# Patient Record
Sex: FEMALE | Race: WHITE | NOT HISPANIC OR LATINO | Employment: OTHER | URBAN - METROPOLITAN AREA
[De-identification: names, ages, dates, MRNs, and addresses within clinical notes are randomized per-mention and may not be internally consistent; named-entity substitution may affect disease eponyms.]

---

## 2017-01-10 ENCOUNTER — OFFICE VISIT (OUTPATIENT)
Dept: NEUROLOGY | Facility: CLINIC | Age: 72
End: 2017-01-10
Payer: MEDICARE

## 2017-01-10 VITALS
HEIGHT: 66 IN | HEART RATE: 74 BPM | SYSTOLIC BLOOD PRESSURE: 120 MMHG | DIASTOLIC BLOOD PRESSURE: 76 MMHG | BODY MASS INDEX: 23.59 KG/M2 | WEIGHT: 146.81 LBS

## 2017-01-10 DIAGNOSIS — R41.3 MEMORY LOSS: Primary | ICD-10-CM

## 2017-01-10 PROCEDURE — 1126F AMNT PAIN NOTED NONE PRSNT: CPT | Mod: S$GLB,,, | Performed by: PSYCHIATRY & NEUROLOGY

## 2017-01-10 PROCEDURE — 1157F ADVNC CARE PLAN IN RCRD: CPT | Mod: S$GLB,,, | Performed by: PSYCHIATRY & NEUROLOGY

## 2017-01-10 PROCEDURE — 1159F MED LIST DOCD IN RCRD: CPT | Mod: S$GLB,,, | Performed by: PSYCHIATRY & NEUROLOGY

## 2017-01-10 PROCEDURE — 99999 PR PBB SHADOW E&M-EST. PATIENT-LVL III: CPT | Mod: PBBFAC,,, | Performed by: PSYCHIATRY & NEUROLOGY

## 2017-01-10 PROCEDURE — 99213 OFFICE O/P EST LOW 20 MIN: CPT | Mod: S$GLB,,, | Performed by: PSYCHIATRY & NEUROLOGY

## 2017-01-10 PROCEDURE — 1160F RVW MEDS BY RX/DR IN RCRD: CPT | Mod: S$GLB,,, | Performed by: PSYCHIATRY & NEUROLOGY

## 2017-01-10 NOTE — LETTER
January 10, 2017      Tucker Ren MD  [address]  [facility] [address]           [facility]  [address]  [facility] [address]  Phone: [phone]  Fax: [phone]          Patient: Katiana Hagan   MR Number: [id number]   YOB: 1945   Date of Visit: 1/10/2017       Dear Dr. Tucker Ren:    Thank you for referring Katiana Hagan to me for evaluation. Attached you will find relevant portions of my assessment and plan of care.    If you have questions, please do not hesitate to call me. I look forward to following Katiana Hagan along with you.    Sincerely,    Giuseppe Galvez MD    Enclosure  CC:  No Recipients    If you would like to receive this communication electronically, please contact [email or web address] or [phone] to request more information on [facility] access.    For providers and/or their staff who would like to refer a patient to [facility], please contact us through our one-stop-shop provider referral line, [facility], at [phone].    If you feel you have received this communication in error or would no longer like to receive these types of communications, please e-mail [email or web address]

## 2017-01-10 NOTE — PROGRESS NOTES
[facility] - NEUROLOGY  [facility] [place] [facility]    Date: January 10, 2017   Patient Name: Katiana Hagan   MRN: [id number]   PCP: Tucker Ren  Referring Provider: Tucker Ren MD    Assessment:      This is Katiana Hagan, 71 y.o. female with mild difficulty with memory most likely due to psychosocial stressors.    Plan:      -  Continue counseling  -  Follow up as needed     Giuseppe Galvez MD  [facility]   Department of Neurology    Subjective:   Short term memory troubles unchanged, not better or worse.  Does endorse improved mood and engaging in more social activities although has not established regular exercise.    PAST MEDICAL HISTORY:  Past Medical History   Diagnosis Date    Other and unspecified hyperlipidemia 10/29/2013       PAST SURGICAL HISTORY:  Past Surgical History   Procedure Laterality Date    Dilation and curettage of uterus      Myomectomy      Appendectomy  age 18    Breast augmentation  1988       CURRENT MEDS:  Current Outpatient Prescriptions   Medication Sig Dispense Refill    conjugated estrogens (PREMARIN) vaginal cream Place 0.5 g vaginally twice a week. Insert into vagina as directed 30 g 6    acyclovir (ZOVIRAX) 400 MG tablet       gabapentin (NEURONTIN) 100 MG capsule TK ONE C PO  QD.  0    gabapentin (NEURONTIN) 300 MG capsule   1     No current facility-administered medications for this visit.        ALLERGIES:  Review of patient's allergies indicates:   Allergen Reactions    Hydrocortisone Itching    Methylprednisolone aceponate     Prednisone Nausea And Vomiting    Tixocortol pivalate Itching       FAMILY HISTORY:  Family History   Problem Relation Age of Onset    Breast cancer Maternal Aunt     Colon cancer Neg Hx     Diabetes Neg Hx     Hypertension Neg Hx     Ovarian cancer Neg Hx     Stroke Neg Hx        SOCIAL HISTORY:  Social History   Substance Use Topics    Smoking status: Never Smoker    Smokeless  "tobacco: Never Used    Alcohol use 1.2 oz/week     2 Glasses of wine per week      Comment: social       Review of Systems:  12 review of systems is negative except for the symptoms mentioned in HPI.        Objective:     Vitals:    01/10/17 1239   BP: 120/76   Pulse: 74   Weight: 66.6 kg (146 lb 13.2 oz)   Height: 5' 6" (1.676 m)       General: NAD, well nourished   Eyes: no tearing, discharge, no erythema   ENT: moist mucous membranes of the oral cavity, nares patent    Neck: Supple, full range of motion  Cardiovascular: Warm and well perfused, pulses equal and symmetrical  Lungs: Normal work of breathing, normal chest wall excursions  Skin: No rash, lesions, or breakdown on exposed skin  Psychiatry: Mood and affect are appropriate   Abdomen: soft, non tender, non distended  Extremeties: No cyanosis, clubbing or edema.    Neurological   MENTAL STATUS: Alert and oriented to person, place, and time. Attention and concentration within normal limits. Speech without dysarthria, able to name and repeat without difficulty. Recent and remote memory within normal limits   CRANIAL NERVES: Visual fields intact. PERRL. EOMI. Facial sensation intact. Face symmetrical. Hearing grossly intact. Full shoulder shrug bilaterally. Tongue protrudes midline   SENSORY: Sensation is intact to light touch throughout.  Joint position perception intact. Negative Romberg.   MOTOR: Normal bulk and tone. No pronator drift.  5/5 deltoid, biceps, triceps, interosseous, hand  bilaterally. 5/5 iliopsoas, knee extension/flexion, foot dorsi/plantarflexion bilaterally.    CEREBELLAR/COORDINATION/GAIT: Gait steady with normal arm swing and stride length.    "

## 2017-01-10 NOTE — MR AVS SNAPSHOT
"    [facility] - Neurology  [address]  [facility] [address]  Phone: [phone]  Fax: [phone]                  Katiana Hagan   1/10/2017 12:20 PM   Office Visit    Description:  Female : 1945   Provider:  Giuseppe Galvez MD   Department:  [facility] - Neurology           Reason for Visit     Follow-up                To Do List           Goals (5 Years of Data)     None      [facility]     [facility] Nurse Care Line -  Assistance  Registered nurses in the [facility] provide clinical advisement, health education, appointment booking, and other advisory services.  Call for this free service at [phone].             Medications           Message regarding Medications     Verify the changes and/or additions to your medication regime listed below are the same as discussed with your clinician today.  If any of these changes or additions are incorrect, please notify your healthcare provider.             Verify that the below list of medications is an accurate representation of the medications you are currently taking.  If none reported, the list may be blank. If incorrect, please contact your healthcare provider. Carry this list with you in case of emergency.           Current Medications     conjugated estrogens (PREMARIN) vaginal cream Place 0.5 g vaginally twice a week. Insert into vagina as directed    acyclovir (ZOVIRAX) 400 MG tablet     gabapentin (NEURONTIN) 100 MG capsule TK ONE C PO  QD.    gabapentin (NEURONTIN) 300 MG capsule            Clinical Reference Information           Vital Signs - Last Recorded  Most recent update: 1/10/2017 12:41 PM by Gayatri Friend MA    BP Pulse Ht Wt BMI    120/76 74 5' 6" (1.676 m) 66.6 kg (146 lb 13.2 oz) 23.7 kg/m2      Blood Pressure          Most Recent Value    BP  120/76      Allergies as of 1/10/2017     Hydrocortisone    Methylprednisolone Aceponate    Prednisone    Tixocortol Pivalate      Immunizations " Administered on Date of Encounter - 1/10/2017     None

## 2017-01-23 ENCOUNTER — TELEPHONE (OUTPATIENT)
Dept: OBSTETRICS AND GYNECOLOGY | Facility: CLINIC | Age: 72
End: 2017-01-23

## 2017-01-23 NOTE — TELEPHONE ENCOUNTER
----- Message from Martín Mueller sent at 1/23/2017  3:25 PM CST -----  Contact: pt  x_ 1st Request   _ 2nd Request   _ 3rd Request     Who: ANASTACIO OG [[id number]]    Why: Pt would like a call back in reference to being referred to a Uro/Gyn doctor    What Number to Call Back: [phone]    When to Expect a call back: (Before the end of the day)   -- if call after 3:00 call back will be tomorrow.

## 2017-01-24 ENCOUNTER — TELEPHONE (OUTPATIENT)
Dept: OBSTETRICS AND GYNECOLOGY | Facility: CLINIC | Age: 72
End: 2017-01-24

## 2017-01-24 DIAGNOSIS — N39.41 URGE INCONTINENCE: Primary | ICD-10-CM

## 2017-01-24 NOTE — TELEPHONE ENCOUNTER
----- Message from Carol Barbosa sent at 1/24/2017  9:56 AM CST -----  Contact: pt   X_  1st Request  _  2nd Request  _  3rd Request    Who:ANASTACIO OG [[id number]]    Why: Patient states she is returning a call     What Number to Call Back: [phone]    When to Expect a call back: (Before the end of the day)   -- if call after 3:00 call back will be tomorrow.

## 2017-02-07 ENCOUNTER — RESEARCH ENCOUNTER (OUTPATIENT)
Dept: UROGYNECOLOGY | Facility: CLINIC | Age: 72
End: 2017-02-07

## 2017-02-07 ENCOUNTER — INITIAL CONSULT (OUTPATIENT)
Dept: UROGYNECOLOGY | Facility: CLINIC | Age: 72
End: 2017-02-07
Attending: OBSTETRICS & GYNECOLOGY
Payer: MEDICARE

## 2017-02-07 VITALS
HEIGHT: 66 IN | SYSTOLIC BLOOD PRESSURE: 110 MMHG | WEIGHT: 145.31 LBS | DIASTOLIC BLOOD PRESSURE: 80 MMHG | BODY MASS INDEX: 23.35 KG/M2

## 2017-02-07 DIAGNOSIS — N81.2 UTEROVAGINAL PROLAPSE, INCOMPLETE: ICD-10-CM

## 2017-02-07 DIAGNOSIS — N81.10 PROLAPSE OF ANTERIOR VAGINAL WALL: ICD-10-CM

## 2017-02-07 DIAGNOSIS — N39.46 MIXED INCONTINENCE: Primary | ICD-10-CM

## 2017-02-07 DIAGNOSIS — N95.2 ATROPHIC VAGINITIS: ICD-10-CM

## 2017-02-07 DIAGNOSIS — R35.1 NOCTURIA: ICD-10-CM

## 2017-02-07 LAB
BILIRUB SERPL-MCNC: NORMAL MG/DL
BLOOD URINE, POC: NORMAL
COLOR, POC UA: NORMAL
GLUCOSE UR QL STRIP: NORMAL
KETONES UR QL STRIP: NORMAL
LEUKOCYTE ESTERASE URINE, POC: NORMAL
NITRITE, POC UA: NORMAL
PH, POC UA: 7
PROTEIN, POC: NORMAL
SPECIFIC GRAVITY, POC UA: 1.01
UROBILINOGEN, POC UA: NORMAL

## 2017-02-07 PROCEDURE — 1160F RVW MEDS BY RX/DR IN RCRD: CPT | Mod: S$GLB,,, | Performed by: OBSTETRICS & GYNECOLOGY

## 2017-02-07 PROCEDURE — 1126F AMNT PAIN NOTED NONE PRSNT: CPT | Mod: S$GLB,,, | Performed by: OBSTETRICS & GYNECOLOGY

## 2017-02-07 PROCEDURE — 1157F ADVNC CARE PLAN IN RCRD: CPT | Mod: S$GLB,,, | Performed by: OBSTETRICS & GYNECOLOGY

## 2017-02-07 PROCEDURE — 51701 INSERT BLADDER CATHETER: CPT | Mod: S$GLB,,, | Performed by: OBSTETRICS & GYNECOLOGY

## 2017-02-07 PROCEDURE — 99999 PR PBB SHADOW E&M-EST. PATIENT-LVL III: CPT | Mod: PBBFAC,,, | Performed by: OBSTETRICS & GYNECOLOGY

## 2017-02-07 PROCEDURE — 81002 URINALYSIS NONAUTO W/O SCOPE: CPT | Mod: S$GLB,,, | Performed by: OBSTETRICS & GYNECOLOGY

## 2017-02-07 PROCEDURE — 1159F MED LIST DOCD IN RCRD: CPT | Mod: S$GLB,,, | Performed by: OBSTETRICS & GYNECOLOGY

## 2017-02-07 PROCEDURE — 87086 URINE CULTURE/COLONY COUNT: CPT

## 2017-02-07 PROCEDURE — 99205 OFFICE O/P NEW HI 60 MIN: CPT | Mod: 25,S$GLB,, | Performed by: OBSTETRICS & GYNECOLOGY

## 2017-02-07 RX ORDER — MULTIVITAMIN
1 TABLET ORAL DAILY
COMMUNITY
End: 2017-09-06

## 2017-02-07 RX ORDER — FLUCONAZOLE 200 MG/1
TABLET ORAL
Refills: 1 | COMMUNITY
Start: 2017-01-25 | End: 2018-09-24

## 2017-02-07 NOTE — LETTER
February 9, 2017      Araceli Quijano MD  [address] [address]           [facility]  [address] [facility] [address]  Phone: [phone]          Patient: Katiana Hagan   MR Number: [id number]   YOB: 1945   Date of Visit: 2/7/2017       Dear Dr. Araceli Quijano:    Thank you for referring Katiana Hagan to me for evaluation. Attached you will find relevant portions of my assessment and plan of care.    If you have questions, please do not hesitate to call me. I look forward to following Katiana Hagan along with you.    Sincerely,    Cole Cochran, DO [facility]  CC:  No Recipients    If you would like to receive this communication electronically, please contact [email or web address] or [phone] to request more information on [employer] access.    For providers and/or their staff who would like to refer a patient to [facility], please contact us through our one-stop-shop provider referral line, [facility], at [phone].    If you feel you have received this communication in error or would no longer like to receive these types of communications, please e-mail [email or web address]

## 2017-02-07 NOTE — PROGRESS NOTES
Subjective:       Patient ID: Katiana Hagan is a 71 y.o. female.    Chief Complaint:  Consult and Urinary Incontinence      History of Present Illness: 71 Y O F  referred for evaluation by Dr. Quijano for urinary urgency, frequency and bothersome incontinence.   HPI :   Ohs Peq Urogyn Hpi      Question    2017 12:01 PM CST - Filled by Cole Cochran, DO     General Urogynecology: Are you experiencing the following?       Dysuria (painful urination)  No     Nocturia:  waking up at night to empty your bladder   Yes     If you answered yes to the previous question, how many times does this happen per night?  3-4     Enuresis (urine loss during sleep)  No     Dribbling urine after you urinate  No     Hematuria (urine appears red)  No     Type of stream  Splayed     Urinary Incontinence (General): Are you experiencing the following?       Past consultation for incontinence: Have you ever seen someone for the evaluation of incontinence?  No     If you answered yes to the previous question, please select all the therapies you have tried.   None of the above     Please note the effectiveness of the therapies.  Ineffective     Need to wear protection to keep clothes dry   Yes     If you answered yes to the previous question, please arden the protection you use.   Pads     If you wear protection, how much wetness is typically on each pad?  Slight     If you wear protection, how often do you have to change per day, if applicable?   1     Stress Symptoms: Are you experiencing the following?       Leakage of urine with cough, laugh and/or sneeze  Yes     If you answered yes to the previous question, what is the frequency in days, weeks and/or months?  Monthly     Leakage of urine with sex  No     Leakage of urine with bending/ lifting  No     Leakage of urine with briskly walking or jogging  No     If you lose urine for any other reason not previously mentioned, please note it below, if applicable.        Urge  "Symptoms: Are you experiencing the following?       Urgency ("got to go" feeling)  Yes     Urge: How frequently do you feel an urge to urinate (feeling like you "gotta go" to the bathroom and can't wait)  Several times a day     Do you experience a leakage of urine when you have a feeling of urgency?   Yes     Leakage of urine when unaware  No     Past use of anticholinergics (medications used to treat overactive bladder)  No     If you answered yes to the previous question, please arden the anticholinergics you have used:        Have you ever used Mirbetriq (aka Mirabegron)?   No     Prolapse Symptoms: Are you experiencing any of the following?        Falling out/ Bulging/ Heaviness in the vagina  Yes     Vaginal/ Abdominal Pain/ Pressure  No     Need to strain/ Push to void  No     Need to wait on the toilet before you void  No     Unusual position to urinate (using your hands to push back the vaginal bulge)  No     Sensation of incomplete emptying  No     Past use of pessary device  No     If you answered yes to the previous question, please list the devices you have used below.        Bowel Symptoms: Are you experiencing any of the following?       Constipation  No     Diarrhea   No     Hematochezia (bloody stool)  No     Incomplete evacuation of stool  No     Involuntary loss of formed stool  No     Fecal smearing/urgency  No     Involuntary loss of gas  No     Vaginal Symptoms: Are you experiencing any of the following?        Abnormal vaginal bleeding   No     Vaginal dryness  Yes     Sexually active   No     Dyspareunia (painful intercourse)  No     Estrogen use   Yes     HPI reviewed and confirmed with patient     GYN & OB History  No LMP recorded. Patient is postmenopausal.   Date of Last Pap: 2016    OB History    Para Term  AB SAB TAB Ectopic Multiple Living   2 2 2       1      # Outcome Date GA Lbr Curt/2nd Weight Sex Delivery Anes PTL Lv   2 Term      Vag-Spont  N    1 Term      " Vag-Spont  N Y        Past Medical History   Diagnosis Date    Other and unspecified hyperlipidemia 10/29/2013     Past Surgical History   Procedure Laterality Date    Dilation and curettage of uterus      Myomectomy      Appendectomy  age 18    Breast augmentation  1988       Current Outpatient Prescriptions:     conjugated estrogens (PREMARIN) vaginal cream, Place 0.5 g vaginally twice a week. Insert into vagina as directed, Disp: 30 g, Rfl: 6    fluconazole (DIFLUCAN) 200 MG Tab, TK 1 T PO ONCE A WEEK WITH FOOD, Disp: , Rfl: 1    multivitamin (ONE DAILY MULTIVITAMIN) per tablet, Take 1 tablet by mouth once daily., Disp: , Rfl:     MV-MN/VITC/ASBNA/GLU/KWAME/ (AIRBORNE, ASCORBATE SODIUM, ORAL), Take by mouth., Disp: , Rfl:     Review of patient's allergies indicates:   Allergen Reactions    Hydrocortisone Itching    Methylprednisolone aceponate     Prednisone Nausea And Vomiting    Tixocortol pivalate Itching       Review of Systems  Review of Systems   Constitutional: Negative.  Negative for activity change, appetite change, chills, diaphoresis, fatigue, fever and unexpected weight change.   HENT: Negative.    Eyes: Negative.    Respiratory: Negative.  Negative for apnea, cough and wheezing.    Cardiovascular: Negative.  Negative for chest pain and palpitations.   Gastrointestinal: Negative for abdominal distention, abdominal pain, anal bleeding, blood in stool, constipation, diarrhea, nausea, rectal pain and vomiting.   Endocrine: Negative.    Genitourinary: Positive for frequency and urgency. Negative for decreased urine volume, difficulty urinating, dyspareunia, dysuria, enuresis, flank pain, genital sores, hematuria, menstrual problem, pelvic pain, vaginal bleeding, vaginal discharge and vaginal pain.   Musculoskeletal: Negative for back pain and gait problem.   Skin: Negative for color change, pallor, rash and wound.   Allergic/Immunologic: Negative for immunocompromised state.    Neurological: Negative.  Negative for dizziness and speech difficulty.   Hematological: Negative for adenopathy.   Psychiatric/Behavioral: Negative for agitation, behavioral problems, confusion and sleep disturbance.           Objective:     Physical Exam   Constitutional: She is oriented to person, place, and time. She appears well-developed.   HENT:   Head: Normocephalic and atraumatic.   Eyes: Conjunctivae and EOM are normal.   Neck: Normal range of motion. Neck supple.   Cardiovascular: Normal rate, regular rhythm, S1 normal, S2 normal, normal heart sounds and intact distal pulses.    Pulmonary/Chest: Effort normal and breath sounds normal. She exhibits no tenderness.   Abdominal: Soft. Bowel sounds are normal. She exhibits no distension and no mass. There is no hepatosplenomegaly, splenomegaly or hepatomegaly. There is no tenderness. There is no rigidity, no rebound, no guarding and no CVA tenderness. No hernia. Hernia confirmed negative in the right inguinal area and confirmed negative in the left inguinal area.   Genitourinary: Pelvic exam was performed with patient supine. Rectum normal, vagina normal, uterus normal, cervix normal, skenes normal and bartholins normal. Right labia normal and left labia normal. Urethra exhibits hypermobility. Urethra exhibits no urethral caruncle, no urethral diverticulum and no urethral mass. Right bartholin is not enlarged and not tender. Left bartholin is not enlarged and not tender. Rectal exam shows resting tone normal and active tone normal. Rectal exam shows no external hemorrhoid, no fissure, no tenderness, anal tone normal and no dovetailing. Guaiac negative stool. There is a rectocele, a cystocele and atrophy in the vagina. No foreign body, tenderness, bleeding, unspecified prolapse of vaginal walls, fistula, mesh exposure or lavator tenderness in the vagina. No vaginal discharge found. Right adnexum displays no mass and no tenderness. Left adnexum displays no mass  and no tenderness. Cervix exhibits no motion tenderness and no discharge. Uterus is not tender and not experiencing uterine prolapse.   PVR: 75 ML  Empty cough stress test: Negative.  Kegel: 2/5    POP-Q  Aa: 1 Ba: 1 C: -2   GH: 3 PB: 2 TVL: 8   Ap: 0 Bp: 0 D: -3                     Musculoskeletal: Normal range of motion.   Lymphadenopathy:     She has no axillary adenopathy.        Right: No inguinal adenopathy present.        Left: No inguinal adenopathy present.   Neurological: She is alert and oriented to person, place, and time. She has normal strength and normal reflexes. Cranial nerves II through XII intact. No cranial nerve deficit.   Skin: Skin is warm, dry and intact.   Psychiatric: She has a normal mood and affect. Her speech is normal and behavior is normal. Judgment normal. Cognition and memory are normal.          Assessment:        1. Mixed incontinence    2. Atrophic vaginitis    3. Nocturia    4. Uterovaginal prolapse, incomplete    5. Prolapse of anterior vaginal wall             Plan:      1.  Mixed urinary incontinence, urge > stress:   --Bladder diary for 3-7 days, write the number of times you go to the rest room and associated symptoms of urgency or leakage.   --Empty bladder every 3 hours.  Empty well: wait a minute, lean forward on toilet.    --Avoid dietary irritants (see sheet).  Keep diary x 3-5 days to determine your irritants.  --KEGELS: do 10 in AM and 10 in PM, holding each x 10 seconds.  When you feel urge to go, STOP, KEGEL, and when urge has passed, then go to bathroom.  Start pelpvic floor PT   --URGE: Consider Ditropan 10 mg daily or Myrbetriq 50 mg daily.  SE profile reviewed.    Takes 2-4 weeks to see if will have effect.  For dry mouth: get sour, sugar free lozenge or gum.    --STRESS:  Pessary vs. Sling vs impressa     2. Vaginal atrophy (dryness):  Use 1 gram of estrogen cream in vagina nightly x 2 weeks, then twice a week thereafter.     3. Prolapse: Stage 2 apical  prolapse, Stage 3 anterior and posterior vaginal wall prolapse, asymptomatic   --Pessary discussed, to decide  --Daily pelvic floor exercises as assigned, consider Pelvic floor PT in future  --Non surgical options discussed with patient    --Surgical options discussed such as TVH with anterior/ posterior colporrhaphy concomitant apical suspension such as (SSF, USLS ) Or  SCP. Risks/ benefits/ alternatives/ succuss and failure rates were reviewed. Patient is not too bothered by the prolapse.     4. --Nocturia (nighttime urination): stop fluids 2 hours before bed.  If have leg swelling:  Elevate feet above chest x 1 hour before bed to get excess fluid off.  Can also use support hose (knee highs).      Approximately  50 min were spent in consult, 90 % in discussion.     Thank you for requesting consultation of your patient.  I look forward to participating in her care.    Cole Cochran DO  Female Pelvic Medicine and Reconstructive Surgery  [facility] [place], [place]

## 2017-02-07 NOTE — PROGRESS NOTES
Subject was consented for [facility] for the Collection of Women's Health Specimens  (IRB # 2016.065.A,PI - Bin).  The study was explained to the subject in  detail, and the Informed Consent was  reviewed, and discussed with the subject prior to consenting. All risks, and benefits, were discussed. Adequate time was given for the subject to ask questions and receive answers. Subject confirmed that all questions had been answered and  verbalized desire to participate in study, understanding that participation is voluntary and she can withdraw at any time. Subject signed Informed consent and a signed copy was provided. This Informed Consent process along with completion of Eligibility criteria was conducted prior to initiation of any study procedures.  Patient was consented by Dr. Cochran in clinic on 2/7/17.

## 2017-02-07 NOTE — MR AVS SNAPSHOT
[facility]  [address] [facility] [address]  Phone: [phone]                  Katiana Hagan   2017 11:00 AM   Initial consult    Description:  Female : 1945   Provider:  Cole Cochran DO   Department:  [facility]           Reason for Visit     Consult     Urinary Incontinence           Diagnoses this Visit        Comments    Atrophic vaginitis    -  Primary     Urge incontinence         Nocturia                To Do List           Future Appointments        Provider Department Dept Phone    3/17/2017 1:30 PM Cole Cochran DO [facility] [phone]      Goals (5 Years of Data)     None      [facility]     [facility] On [facility] Nurse Care Line -  Assistance  Registered nurses in the [facility] Center provide clinical advisement, health education, appointment booking, and other advisory services.  Call for this free service at [phone].             Medications           Message regarding Medications     Verify the changes and/or additions to your medication regime listed below are the same as discussed with your clinician today.  If any of these changes or additions are incorrect, please notify your healthcare provider.        STOP taking these medications     gabapentin (NEURONTIN) 100 MG capsule TK ONE C PO  QD.    gabapentin (NEURONTIN) 300 MG capsule     acyclovir (ZOVIRAX) 400 MG tablet            Verify that the below list of medications is an accurate representation of the medications you are currently taking.  If none reported, the list may be blank. If incorrect, please contact your healthcare provider. Carry this list with you in case of emergency.           Current Medications     conjugated estrogens (PREMARIN) vaginal cream Place 0.5 g vaginally twice a week. Insert into vagina as directed    fluconazole (DIFLUCAN) 200 MG Tab TK 1 T PO ONCE A WEEK WITH FOOD    multivitamin (ONE  "DAILY MULTIVITAMIN) per tablet Take 1 tablet by mouth once daily.    MV-MN/VITC/ASBNA/GLU/KWAME/ (AIRBORNE, ASCORBATE SODIUM, ORAL) Take by mouth.           Clinical Reference Information           Your Vitals Were     BP Height Weight BMI       110/80 5' 6" (1.676 m) 65.9 kg (145 lb 4.5 oz) 23.45 kg/m2       Blood Pressure          Most Recent Value    BP  110/80      Allergies as of 2/7/2017     Hydrocortisone    Methylprednisolone Aceponate    Prednisone    Tixocortol Pivalate      Immunizations Administered on Date of Encounter - 2/7/2017     None      Orders Placed During Today's Visit      Normal Orders This Visit    Ambulatory consult to Physical Therapy     POCT URINE DIPSTICK WITHOUT MICROSCOPE     Urine culture       Language Assistance Services     ATTENTION: Language assistance services are available, free of charge. Please call [phone].      ATENCIÓN: Si melodie bethea, tiene a avila disposición servicios gratuitos de asistencia lingüística. Llame al [phone].     CHÚ Ý: N?u b?n nói Ti?ng Vi?t, có các d?ch v? h? tr? ngôn ng? mi?n phí dành cho b?n. G?i s? [phone].         [facility] complies with applicable Federal civil rights laws and does not discriminate on the basis of race, color, national origin, age, disability, or sex.        "

## 2017-02-09 ENCOUNTER — PATIENT MESSAGE (OUTPATIENT)
Dept: UROGYNECOLOGY | Facility: CLINIC | Age: 72
End: 2017-02-09

## 2017-02-09 PROBLEM — N39.41 URGE INCONTINENCE: Status: ACTIVE | Noted: 2017-02-09

## 2017-02-09 PROBLEM — R35.1 NOCTURIA: Status: ACTIVE | Noted: 2017-02-09

## 2017-02-09 PROBLEM — N81.2 UTEROVAGINAL PROLAPSE, INCOMPLETE: Status: ACTIVE | Noted: 2017-02-09

## 2017-02-09 LAB — BACTERIA UR CULT: NO GROWTH

## 2017-02-15 ENCOUNTER — CLINICAL SUPPORT (OUTPATIENT)
Dept: REHABILITATION | Facility: HOSPITAL | Age: 72
End: 2017-02-15
Attending: OBSTETRICS & GYNECOLOGY
Payer: MEDICARE

## 2017-02-15 DIAGNOSIS — N39.41 URGE INCONTINENCE: Primary | ICD-10-CM

## 2017-02-15 PROCEDURE — 97110 THERAPEUTIC EXERCISES: CPT | Mod: PO

## 2017-02-15 PROCEDURE — G8991 OTHER PT/OT GOAL STATUS: HCPCS | Mod: CI,PO

## 2017-02-15 PROCEDURE — G8990 OTHER PT/OT CURRENT STATUS: HCPCS | Mod: CJ,PO

## 2017-02-15 PROCEDURE — 97161 PT EVAL LOW COMPLEX 20 MIN: CPT | Mod: PO

## 2017-02-15 NOTE — PROGRESS NOTES
Patient: Katiana SHOEMAKER [facility] #:  [id number]    Date of treatment: 02/15/2017   Time in: 1:02  Time out: 2:10    Katiana is a 71 y.o. female evaluated on 2/15/2017    Physician:  Cole Cochran,*   Diagnosis:   Encounter Diagnosis   Name Primary?    Urge incontinence Yes        Treatment ordered: PT    Medical History:   Past Medical History   Diagnosis Date    Other and unspecified hyperlipidemia 10/29/2013        Surgical History:   Past Surgical History   Procedure Laterality Date    Dilation and curettage of uterus      Myomectomy      Appendectomy  age 18    Breast augmentation  1988        Medications:   Current Outpatient Prescriptions   Medication Sig    conjugated estrogens (PREMARIN) vaginal cream Place 0.5 g vaginally twice a week. Insert into vagina as directed    fluconazole (DIFLUCAN) 200 MG Tab TK 1 T PO ONCE A WEEK WITH FOOD    multivitamin (ONE DAILY MULTIVITAMIN) per tablet Take 1 tablet by mouth once daily.    MV-MN/VITC/ASBNA/GLU/KWAME/ (AIRBORNE, ASCORBATE SODIUM, ORAL) Take by mouth.     No current facility-administered medications for this visit.        Allergies:   Review of patient's allergies indicates:   Allergen Reactions    Hydrocortisone Itching    Methylprednisolone aceponate     Prednisone Nausea And Vomiting    Tixocortol pivalate Itching      Precautions: universal  OB:GYN History:childbirth vaginal delivery x 2;       Bladder/Bowel History: slow stream, dribbling after urination, trouble emptying bladder completely and urinary incontinence       SUBJECTIVE:   History of current complaint: pt reports a history of urge incontinence- mainly key in the door leakage.  More UUI than stress UI.      Date of Onset: about a year ago  Symptoms are: unchanged    Frequency of Urination: Daytime: every 2-3 hours           Nighttime: 1-2    Urine Stream: weak    Frequency of Bowel Movements: once a day    Types of Fluid Intake: water, coffee (2 cups), OJ, no soda, occ  tea, hot chocolate, milk, Kefir      Bladder Leakage: Yes  Frequency of incidents: almost daily  Amount Leaked: teaspoons    Colon Leakage: No    Form of Protection: pad  Number of Pads required in 24 hours: 1-2    Activities that cause symptoms:strong urge to go, walking to the toilet and full bladder    Current Exercise: none- used to do stretching exercises     Pain:  Patient reports 0/10 with 0 being the lowest and 10 being the highest        OBJECTIVE:  Patient received 50 minutes of treatment which consisted of evaluation and 10 minutes of therapeutic exercise    ORTHO SCREEN  Posture: WNL      Pelvic Alignment: no deviations noted in supine       ABDOMINALS  Scarring:  RLQ scar slightly adherent to underlying tissues but not painful      Diastasis: 1 finger   Abdominal strength: rectus 3/5     Transverse : poorly isolated but strong contraction.         VAGINAL PELVIC FLOOR EXAM  Introitus:  WNL  Skin Condition: redness noted at 6 oclock  Contraction Response:   bulge  Valsalva Response prolapse    External Clock  Scarring: none  Sensation:WNL  Pain:none  Prolapse Check: Grade 2 cystocele and Grade 2 rectocele       Internal Clock  Pain: none  Sensation:able to localize pressure appropriately    Vaginal Vault : asymmetrical  Muscle Bulk:atrophy  Muscle Power: 2        Quality of Contraction:  slow rise and slow relaxation  Specificity:patient contracts: gluts but corrected with verbal cues   Coordination: tends to hold breath during PFM contraction    SEMG Evaluation: deferred due to time constraints    Treatment Given: instructed on general anatomy/physiology of urinary/bowel system; discussed plan of care with patient; instructed in benefits/risks of treatment; instructed in alternative methods of treatment; instructed in risks of refusing treatment; patient agreed to treatment plan; instructed in Kegels per handout issued    ASSESSMENT:  History  Co-morbidities and personal factors that may impact the plan  of care Examination  Body Structures and Functions, activity limitations and participation restrictions that may impact the plan of care Clinical Presentation   Decision Making/ Complexity Score   Co-morbidities:                 Personal Factors:    Body Regions:    Body Systems: weak pelvic floor muscles          Activity limitations:   Social activity and travel outside the home limited by leakage after arriving home.      Participation Restrictions:          Stable and uncomplicated   25% impaired via PFDI Urinary survey via FOTO         Problem List:    poor knowledge of body mechanics and posture, perineal descent, decreased pelvic muscle strength, poor quality of pelvic muscle contraction and poor coordination of pelvic floor muscles during ADL's leading to urinary or fecal leakage    Barriers to Learning: none    Educational/Spiritual/Cultural needs:  none    FUNCTIONAL GOALS: 3 months  1. Patient able to perform basic ADL with less leaking.,   2. Decreased need for pad use for incontinence. ,   3. Pelvic floor muscle contraction long enough to inhibit bladder contraction.,   4. Able to maintain normal breathing pattern during pelvic floor muscle contraction.,   5. Pt. to be I with techniques for double voiding.   6. Pt to be I with HEP.    PATIENTS GOALS: to be able to get in the door after running errands without urinary leakage. ().  The current impairment level is 25 percent impaired (CJ) based on the (PFDI-Urinary) score of 25%.  He/she is expected to achieve a score of 19 percent impaired (-CZ) after 10 therapy visits.    PLAN:  theraputic exercises and neuromuscular re-ed- pt to be seen by Lauren Shepley at our [place] location.    Physical Therapy Education: bladder irritants, anatomy/physiology of pelvic floor, posture/body mechanices, diaphragmatic breathing and double voiding techniques    Frequency/Duration: once per 1-2 weeks for 3 months

## 2017-02-15 NOTE — PATIENT INSTRUCTIONS
"Home Program: 2/15/2017    Kegels in lyin. Lie down comfortably with legs and buttocks relaxed.  2. "Close your vagina".  Keep buttocks relaxed.  3. Hold for 5 sec without holding breath.  4. Release (DROP) for 10 sec.  5. Repeat 10 times, twice per day.      No Kegels while urinating.  Try to sit to urinate as much as possible.    "

## 2017-02-15 NOTE — Clinical Note
Sending her to you as you [place]  Message me with any questions- parveen lyles! LMW

## 2017-03-02 ENCOUNTER — CLINICAL SUPPORT (OUTPATIENT)
Dept: REHABILITATION | Facility: HOSPITAL | Age: 72
End: 2017-03-02
Attending: OBSTETRICS & GYNECOLOGY
Payer: MEDICARE

## 2017-03-02 DIAGNOSIS — N39.41 URGE INCONTINENCE: Primary | ICD-10-CM

## 2017-03-02 PROCEDURE — 97110 THERAPEUTIC EXERCISES: CPT | Mod: PN

## 2017-03-02 PROCEDURE — 97140 MANUAL THERAPY 1/> REGIONS: CPT | Mod: PN

## 2017-03-02 NOTE — PROGRESS NOTES
"Patient: Katiana SHOEMAKER [facility] #: [id number]   Diagnosis:   Encounter Diagnosis   Name Primary?    Urge incontinence Yes      Date of start of care: 2/15/17  Date of treatment: 03/02/2017   Time in: 2:10  Time out: 3:10  Total treatment time: 60 minutes  # Visits: 2/20  Auth expiration: 12/31/17  POC expiration: 5/10/17    Outpatient Physical Therapy   Daily Note    Subjective     Pt reports she has sought out help due to an increase in urge incontinence over the past year or so. She states that she often leaks drops but sometimes it is more. Pt reports that she is able to hold her bladder when she is out shopping but if she is home or just getting home is unable to hold it and "does not have any control."     Pain: Current: 0/10    Objective     TREATMENT    Pt received individual therapeutic exercise to develop strength, coordination, endurance, motor control and core stability for 45 minutes including:    - Kegals, supine, 10 x 5'' - pt demonstrates a weak 2/5 strength with slow recruitment, poor holding ability, and slow but complete derecruitment, poor isolation with use of gluts and abdominal, and breath holding; with verbal and tactile cueing pt able to isolate PFM today ~ 4/10 times and able to count out loud in order to eliminate breath holding  - Supine clams 2 x 10 RTB  - Supine ball squeeze 2 x 10  - TA sets    Pt received manual therapy for 10 minutes including: soft tissue mobilization applied to bilateral levators to improve BF and neuromuscular control    Education: Discussed progression of plan of care with patient; educated pt in activity modification; pt was instructed to continue with current HEP (kegals in supine, 2 x 10 x 5'') with addition of TA sets, clams, and ball squeeze; pt verbalized understanding and was provided with a handout.     Assessment     Pt tolerated session well with no visible adverse effects. Pt displays significant PFM weakness with decreased awareness of her PFM and " difficulty recruiting her muscles on demand. Pt with primary complaint of UUI increasing over the last year. Mrs. Hagan will benefit from continued skilled PT intervention in order to develop strength, coordination, endurance, and neuromuscular control of her PFM to improve urge incontinence in order to maximize functional independence and quality of life. Will provide tactile cueing as needed until pt is able to perform PFM contraction on demand with less difficulty. Will instruct pt in diaphragmatic breathing at her next visit and progress PFM exercises.     Pt is making good progress towards established goals. No educational, cultural, or spiritual goals identified.    Short Term Goals: 3/15/17  1. Pt will verbalize improved awareness of PFM activity as palpated by PT in order to improve activity involvement with HEP.  2. Pt will demonstrate decreased overflow mm activity during PFM contraction.  3. Pt will demonstrate increase in PFM endurance to 5 seconds in order to improve symptoms of UUI.  4. Pt will tolerate HEP to improve impairments and independence with ADLs.    Long Term Goals: 12 weeks (5/10/17)  1. Pt will report ability to return home from errands, slowly enter house, place bags down and slowly walk to the bathroom to urinate without leaking.   2. Pt will report decreased need for pad use for incontinence.  3. Pt will be able to perform a pelvic floor muscle contraction long enough to inhibit bladder contraction in order to improve UI.  4. Pt will be able to maintain a normal breathing pattern during pelvic floor muscle contraction in order to progress towards independence.   5. Pt. will be I with techniques for double voiding in order to improve ability to self manage symptoms.   6. Pt will be independent with HEP and self management.     PATIENTS GOALS: to be able to get in the door after running errands without urinary leakage. (). The current impairment level is 25 percent impaired (CJ)  based on the (PFDI-Urinary) score of 25%.  He/she is expected to achieve a score of 19 percent impaired (-XZ) after 10 therapy visits.    Plan     Continue with established POC, working toward PT goals.

## 2017-03-15 ENCOUNTER — CLINICAL SUPPORT (OUTPATIENT)
Dept: REHABILITATION | Facility: HOSPITAL | Age: 72
End: 2017-03-15
Attending: OBSTETRICS & GYNECOLOGY
Payer: MEDICARE

## 2017-03-15 DIAGNOSIS — N39.41 URGE INCONTINENCE: ICD-10-CM

## 2017-03-15 PROCEDURE — 97140 MANUAL THERAPY 1/> REGIONS: CPT | Mod: PN

## 2017-03-15 PROCEDURE — 97112 NEUROMUSCULAR REEDUCATION: CPT | Mod: PN

## 2017-03-15 NOTE — PATIENT INSTRUCTIONS
Home Exercise Program: 03/15/2017      DIAPHRAGMATIC BREATHING     The diaphragm is a dome shaped muscle that forms the floor of the rib cage. It is the most efficient muscle for breathing and relaxation, although most people are not used to using the diaphragm. Diaphragmatic or belly breathing is an important technique to learn because it helps settle down or relax the autonomic nervous system. The correct use of diaphragmatic breathing can help to quiet brain activity resulting in the relaxation of all the muscles and organs of the body. This is accomplished by slow rhythmic breathing concentrated in the diaphragm muscle rather than the chest.    How to do proper relaxation breathing:     Start by lying on your back or reclining in a chair in a relaxed position. Place one hand on your chest and the other on your abdomen.   Relax your jaw by placing your tongue on the roof of your mouth and keeping your teeth slightly apart.    Take a deep breath in through your nose, letting the abdomen expand and rise while you keep your upper chest, neck and shoulders relaxed.    As you breathe out through your mouth (as if blowing out candles), allow your abdomen and chest to fall. Exhale completely.   Remember to breathe slowly.  Do not force your breathing. Do not hold your breath.   Repeat for _______ minutes.

## 2017-03-15 NOTE — PROGRESS NOTES
Patient: Katiana SHOEMAKER [facility] #: [id number]   Diagnosis:   Encounter Diagnosis   Name Primary?    Urge incontinence       Date of start of care: 2/15/17  Date of treatment: 03/15/2017   Time in: 10:05  Time out: 11:05  Total treatment time: 60 minutes  # Visits: 3/20  Auth expiration: 12/31/17  POC expiration: 5/10/17    Outpatient Physical Therapy   Daily Note    Subjective     Pt reports she has had a lot going on with company being in town and with her sister who was recently hospitalized for stent placement. Pt reports that she did her exercises some but not a lot and she did not perform the rubber band exercises because she disliked the smell of the theraband.     Pain: Current: 0/10    Objective     TREATMENT    Pt received individual neuromuscular reeducation to develop strength, coordination, endurance, motor control and core stability for 45 minutes including:    - Kegals, supine, 15 x 5'' with tactile cueing throughout - pt demonstrates a weak 2/5 strength with slow recruitment, poor holding ability, and slow but complete derecruitment, pt continues to display difficulty recruiting her PFM with initial contraction isolated but unable to maintain requiring max verbal and tactile cueing; pt displays best PFM recruitment with TA contraction; she was instructed to perform co-contraction at home in order to optimize PFM strengthening  - TA activation 5 x 5'' ; pt performs PPT to activate transverse and with poor isolation; however she demonstrated good improvement with verbal instruction and displays good carry over to PFM  - Diaphragmatic breathing with manual and tactile cueing x 10 min  - Progressive muscle relaxation/body scan x 10 min    Pt received manual therapy for 10 minutes including: soft tissue mobilization applied to bilateral levators to improve BF and neuromuscular control    Education: Discussed progression of plan of care with patient; educated pt in activity modification; pt was instructed  to continue with clams and ball squeeze (she was provided with a latex free theraband); pt was instructed to perform TA sets with kegals x 30 per day and to perform diaphragmatic breathing and calm yusra at home; Mrs. Hagan verbalized understanding and was provided with a handout. Pt demonstrated good understanding of all education provided.    Assessment     Pt tolerated session well with no visible adverse effects. Pt came to today's session with increased stress level due to recently having company in [employer] and sister's recent hospitalization; she demonstrated good response to diaphragmatic breathing and to all tx provided today with improvement noted in isolation and quality of her TA activation with good carry over to her PFM. Pt continues to present with much PFM weakness and difficulty recruiting muscle on demand, will continue to work to improve awareness and neuromuscular control. Mrs. Hagan will benefit from continued skilled PT intervention in order to develop strength, coordination, endurance, and neuromuscular control of her PFM to improve urge incontinence in order to maximize functional independence and quality of life. Will provide tactile cueing as needed until pt is able to perform PFM contraction on demand with less difficulty. Will provide abdominal soft tissue mobilization as needed and possibly utilize SEMG at pts next session pending improvement in PFM contraction.     Pt is making good progress towards established goals. No educational, cultural, or spiritual goals identified.    Short Term Goals: 3/15/17  1. Pt will verbalize improved awareness of PFM activity as palpated by PT in order to improve activity involvement with HEP. Progressing, continue  2. Pt will demonstrate decreased overflow mm activity during PFM contraction. Progressing, continue  3. Pt will demonstrate increase in PFM endurance to 5 seconds in order to improve symptoms of UUI. Continue  4. Pt will tolerate HEP to improve  impairments and independence with ADLs. MET 3/15/17    Long Term Goals: 12 weeks (5/10/17)  1. Pt will report ability to return home from errands, slowly enter house, place bags down and slowly walk to the bathroom to urinate without leaking.   2. Pt will report decreased need for pad use for incontinence.  3. Pt will be able to perform a pelvic floor muscle contraction long enough to inhibit bladder contraction in order to improve UI.  4. Pt will be able to maintain a normal breathing pattern during pelvic floor muscle contraction in order to progress towards independence.   5. Pt. will be I with techniques for double voiding in order to improve ability to self manage symptoms.   6. Pt will be independent with HEP and self management.     PATIENTS GOALS: to be able to get in the door after running errands without urinary leakage. (). The current impairment level is 25 percent impaired (CJ) based on the (PFDI-Urinary) score of 25%.  He/she is expected to achieve a score of 19 percent impaired (-UZ) after 10 therapy visits.    Plan     Continue with established POC, working toward PT goals.

## 2017-03-20 ENCOUNTER — CLINICAL SUPPORT (OUTPATIENT)
Dept: REHABILITATION | Facility: HOSPITAL | Age: 72
End: 2017-03-20
Attending: OBSTETRICS & GYNECOLOGY
Payer: MEDICARE

## 2017-03-20 DIAGNOSIS — N39.41 URGE INCONTINENCE: ICD-10-CM

## 2017-03-20 PROCEDURE — 97110 THERAPEUTIC EXERCISES: CPT | Mod: PN

## 2017-03-20 PROCEDURE — 97140 MANUAL THERAPY 1/> REGIONS: CPT | Mod: PN

## 2017-03-20 NOTE — PROGRESS NOTES
Patient: Katiana SHOEMAKER [facility] #: [id number]   Diagnosis:   Encounter Diagnosis   Name Primary?    Urge incontinence       Date of start of care: 2/15/17  Date of treatment: 03/20/2017   Time in: 10:05  Time out: 11:02  Total treatment time: 57 minutes  # Visits: 4/20  Auth expiration: 12/31/17  POC expiration: 5/10/17    Outpatient Physical Therapy   Daily Note    Subjective     Pt reports she is very tired today; she had a rough night and had to get up a lot because her son took her car out in the middle of the night and she also ate crawfish last night and was so thirsty that she drank a lot of water and so was up using the bathroom a lot. She states she has done some exercises and some breathing but not as prescribed. She reports that she is spending hours and hours with a friend who broke one arm and the opposite hand and therefore hasn't had a lot of time.    Pain: Current: 0/10    Objective     TREATMENT    Pt received individual neuromuscular reeducation to develop strength, coordination, endurance, motor control and core stability for 27 minutes including:    - Supine hip flexor stretch x 2 min ea  - Supine piriformis stretch x 2 min ea  - Progressive muscle relaxation x 10 min    Not performed:  - Kegals, supine, 15 x 5'' with tactile cueing throughout - pt demonstrates a weak 2/5 strength with slow recruitment, poor holding ability, and slow but complete derecruitment, pt continues to display difficulty recruiting her PFM with initial contraction isolated but unable to maintain requiring max verbal and tactile cueing; pt displays best PFM recruitment with TA contraction; she was instructed to perform co-contraction at home in order to optimize PFM strengthening  - TA activation 5 x 5'' ; pt performs PPT to activate transverse and with poor isolation; however she demonstrated good improvement with verbal instruction and displays good carry over to PFM  - Diaphragmatic breathing with manual and tactile  cueing x 10 min    Pt received manual therapy for 30 minutes including: soft tissue mobilization applied to abdomen; pt with mild restrictions noted RLQ with good response to manual care    Education: Discussed progression of plan of care with patient; educated pt in activity modification; pt was instructed to continue with current HEP (clams, ball squeeze, TA sets with kegals, diaphragmatic breathing, and calm yusra); Mrs. Hagan verbalized understanding. Pt demonstrated good understanding of all education provided.    Assessment     Pt tolerated session well with no visible adverse effects. Pt noted with continuation of stress and somewhat flustered today due to having many thing going on in her life but with good participation and response to today's tx session. Pt displays good tissue mobility of her abdomen with no major restrictions following manual care. Will continue to assess PFM at her next visit and provide manual cueing as need/reassess strength in order to continue and progress PFM exercises; will also assess via SEMG. Pt continues to present with much PFM weakness and difficulty recruiting muscle on demand, will continue to work to improve awareness and neuromuscular control. Mrs. Hagan will benefit from continued skilled PT intervention in order to develop strength, coordination, endurance, and neuromuscular control of her PFM to improve urge incontinence in order to maximize functional independence and quality of life. Will provide tactile cueing as needed until pt is able to perform PFM contraction on demand with less difficulty.     Pt is making good progress towards established goals. No educational, cultural, or spiritual goals identified.    Short Term Goals: 3/15/17  1. Pt will verbalize improved awareness of PFM activity as palpated by PT in order to improve activity involvement with HEP. Progressing, continue  2. Pt will demonstrate decreased overflow mm activity during PFM contraction.  Progressing, continue  3. Pt will demonstrate increase in PFM endurance to 5 seconds in order to improve symptoms of UUI. Continue  4. Pt will tolerate HEP to improve impairments and independence with ADLs. MET 3/15/17    Long Term Goals: 12 weeks (5/10/17)  1. Pt will report ability to return home from errands, slowly enter house, place bags down and slowly walk to the bathroom to urinate without leaking.   2. Pt will report decreased need for pad use for incontinence.  3. Pt will be able to perform a pelvic floor muscle contraction long enough to inhibit bladder contraction in order to improve UI.  4. Pt will be able to maintain a normal breathing pattern during pelvic floor muscle contraction in order to progress towards independence.   5. Pt. will be I with techniques for double voiding in order to improve ability to self manage symptoms.   6. Pt will be independent with HEP and self management.     PATIENTS GOALS: to be able to get in the door after running errands without urinary leakage. (). The current impairment level is 25 percent impaired (CJ) based on the (PFDI-Urinary) score of 25%.  He/she is expected to achieve a score of 19 percent impaired (-DB) after 10 therapy visits.    Plan     Continue with established POC, working toward PT goals.

## 2017-03-29 ENCOUNTER — CLINICAL SUPPORT (OUTPATIENT)
Dept: REHABILITATION | Facility: HOSPITAL | Age: 72
End: 2017-03-29
Attending: OBSTETRICS & GYNECOLOGY
Payer: MEDICARE

## 2017-03-29 DIAGNOSIS — N39.41 URGE INCONTINENCE: Primary | ICD-10-CM

## 2017-03-29 PROCEDURE — 97110 THERAPEUTIC EXERCISES: CPT | Mod: PN

## 2017-03-29 PROCEDURE — 97140 MANUAL THERAPY 1/> REGIONS: CPT | Mod: PN

## 2017-03-29 NOTE — PROGRESS NOTES
Patient: Katiana SHOEMAKER [facility] #: [id number]   Diagnosis:   Encounter Diagnosis   Name Primary?    Urge incontinence Yes      Date of start of care: 2/15/17  Date of treatment: 03/29/2017   Time in: 11:08  Time out: 11:55  Total treatment time: 47 minutes  # Visits: 5/20  Auth expiration: 12/31/17  POC expiration: 5/10/17    Outpatient Physical Therapy   Daily Note    Subjective     Pt reports she hasn't noticed a huge change since starting therapy. She states she might be leaking a little less but is also trying to use the bathroom more, drinking less, and states that she tries to perform kegals sometimes when she feels the urge to urinate.    Pain: Current: 0/10    Objective     TREATMENT      Pt received individual neuromuscular reeducation to develop strength, coordination, endurance, motor control and core stability for 30 minutes including:    - Kegals in supine with tactile cueing, 2 x 15 x 5''; pt performs PPT in order to activate her PFM; she continues to display much weakness, poor holding ability, complete derecruitment and decreased awareness; however, she verbalized ability to distinguish activation of her muscles (squeeze) versus drop today  - 10 quick flicks; pt unable to perform properly at this time due to decreased coordination and strength; pt performs PPT with no PFM contraction palpated; will continue to assess moving forward and incorporate into tx plan as pt's strength and coordination improves    Not performed:  - Supine hip flexor stretch x 2 min ea  - Supine piriformis stretch x 2 min ea  - Progressive muscle relaxation x 10 min      Pt received manual therapy for 17 minutes including: soft tissue mobilization applied to bilateral levators and obturators     Education: Discussed progression of plan of care with patient; educated pt in activity modification; pt was instructed to continue with current HEP (clams, ball squeeze, TA sets with kegals, diaphragmatic breathing, and calm yusra);  Mrs. Hagan verbalized understanding. Discussed importance of completing a bladder diary; pt with concerns that she will not remember to fill it out, but she was issued one with instructions to complete as thoroughly as possible. We also discussed urge suppression today; due to pt's difficulty quickly recruiting her PFM and requirement of full attention/focus on her PF in order to achieve activation, performing kegals for urge suppression will be minimally successful; however we discussed importance of not rushing to the bathroom, of sitting down, of trying to perform a few PFM contractions, and then to slowly make her way to the bathroom. Pt verbalized understanding of all education provided.    Assessment     Pt tolerated session well with no visible adverse effects.  Pt continues to present with much PFM weakness and difficulty recruiting muscles on demand, however with a PPT she was able to activate her PFM today. Pt must maintain focus and attention on the task in order to perform optimally; she was encouraged to perform her HEP in a quiet room so that she can be in an environment conducive to this requirement. PT verbalized understanding. Pt was also issued a bladder diary today in order to address urinary habits and fluid intake. Pt is moderately compliant with her home program. Will continue to work to improve awareness and neuromuscular control. Mrs. Hagan will benefit from continued skilled PT intervention in order to develop strength, coordination, endurance, and neuromuscular control of her PFM to improve urge incontinence in order to maximize functional independence and quality of life. Will provide tactile cueing as needed until pt is able to perform PFM contraction on demand with less difficulty.     Pt is making good progress towards established goals. No educational, cultural, or spiritual goals identified.    Short Term Goals: 3/15/17  1. Pt will verbalize improved awareness of PFM activity as  palpated by PT in order to improve activity involvement with HEP. Progressing, continue  2. Pt will demonstrate decreased overflow mm activity during PFM contraction. Progressing, continue  3. Pt will demonstrate increase in PFM endurance to 5 seconds in order to improve symptoms of UUI. Continue  4. Pt will tolerate HEP to improve impairments and independence with ADLs. MET 3/15/17    Long Term Goals: 12 weeks (5/10/17)  1. Pt will report ability to return home from errands, slowly enter house, place bags down and slowly walk to the bathroom to urinate without leaking.   2. Pt will report decreased need for pad use for incontinence.  3. Pt will be able to perform a pelvic floor muscle contraction long enough to inhibit bladder contraction in order to improve UI.  4. Pt will be able to maintain a normal breathing pattern during pelvic floor muscle contraction in order to progress towards independence.   5. Pt. will be I with techniques for double voiding in order to improve ability to self manage symptoms.   6. Pt will be independent with HEP and self management.     PATIENTS GOALS: to be able to get in the door after running errands without urinary leakage. (). The current impairment level is 25 percent impaired (CJ) based on the (PFDI-Urinary) score of 25%.  He/she is expected to achieve a score of 19 percent impaired (-BZ) after 10 therapy visits.    Plan     Continue with established POC, working toward PT goals.

## 2017-04-07 ENCOUNTER — CLINICAL SUPPORT (OUTPATIENT)
Dept: REHABILITATION | Facility: HOSPITAL | Age: 72
End: 2017-04-07
Attending: OBSTETRICS & GYNECOLOGY
Payer: MEDICARE

## 2017-04-07 DIAGNOSIS — N39.41 URGE INCONTINENCE: Primary | ICD-10-CM

## 2017-04-07 PROCEDURE — G8990 OTHER PT/OT CURRENT STATUS: HCPCS | Mod: CK,PN

## 2017-04-07 PROCEDURE — G8991 OTHER PT/OT GOAL STATUS: HCPCS | Mod: CJ,PN

## 2017-04-07 PROCEDURE — 97110 THERAPEUTIC EXERCISES: CPT | Mod: PN

## 2017-06-28 DIAGNOSIS — Z12.11 COLON CANCER SCREENING: ICD-10-CM

## 2017-08-08 ENCOUNTER — TELEPHONE (OUTPATIENT)
Dept: INTERNAL MEDICINE | Facility: CLINIC | Age: 72
End: 2017-08-08

## 2017-08-08 NOTE — TELEPHONE ENCOUNTER
----- Message from Pardeep Eduardo sent at 8/8/2017 12:35 PM CDT -----  Contact: [facility]  Caleb manning. Pt is requesting a call to schedule appointment with Dr. Ren on the same day with her [facility] appointment (09/06/2017). Thanks.

## 2017-09-06 ENCOUNTER — OFFICE VISIT (OUTPATIENT)
Dept: INTERNAL MEDICINE | Facility: CLINIC | Age: 72
End: 2017-09-06
Payer: MEDICARE

## 2017-09-06 ENCOUNTER — DOCUMENTATION ONLY (OUTPATIENT)
Dept: INTERNAL MEDICINE | Facility: CLINIC | Age: 72
End: 2017-09-06

## 2017-09-06 ENCOUNTER — PATIENT MESSAGE (OUTPATIENT)
Dept: INTERNAL MEDICINE | Facility: CLINIC | Age: 72
End: 2017-09-06

## 2017-09-06 VITALS
BODY MASS INDEX: 23.35 KG/M2 | WEIGHT: 145.31 LBS | SYSTOLIC BLOOD PRESSURE: 124 MMHG | HEART RATE: 67 BPM | HEIGHT: 66 IN | DIASTOLIC BLOOD PRESSURE: 76 MMHG

## 2017-09-06 VITALS
BODY MASS INDEX: 23.42 KG/M2 | SYSTOLIC BLOOD PRESSURE: 108 MMHG | WEIGHT: 145.75 LBS | DIASTOLIC BLOOD PRESSURE: 76 MMHG | HEIGHT: 66 IN | HEART RATE: 66 BPM

## 2017-09-06 DIAGNOSIS — Z12.11 ENCOUNTER FOR FIT (FECAL IMMUNOCHEMICAL TEST) SCREENING: ICD-10-CM

## 2017-09-06 DIAGNOSIS — N39.41 URGE INCONTINENCE: ICD-10-CM

## 2017-09-06 DIAGNOSIS — E78.5 HYPERLIPIDEMIA, UNSPECIFIED HYPERLIPIDEMIA TYPE: ICD-10-CM

## 2017-09-06 DIAGNOSIS — E55.9 VITAMIN D DEFICIENCY: ICD-10-CM

## 2017-09-06 DIAGNOSIS — E78.5 OTHER AND UNSPECIFIED HYPERLIPIDEMIA: ICD-10-CM

## 2017-09-06 DIAGNOSIS — Z82.49 FAMILY HISTORY OF HEART DISEASE: ICD-10-CM

## 2017-09-06 DIAGNOSIS — Z00.00 ENCOUNTER FOR PREVENTIVE HEALTH EXAMINATION: Primary | ICD-10-CM

## 2017-09-06 DIAGNOSIS — Z00.00 ROUTINE PHYSICAL EXAMINATION: Primary | ICD-10-CM

## 2017-09-06 DIAGNOSIS — I87.2 VENOUS INSUFFICIENCY OF BOTH LOWER EXTREMITIES: ICD-10-CM

## 2017-09-06 DIAGNOSIS — Z78.0 POSTMENOPAUSAL: ICD-10-CM

## 2017-09-06 DIAGNOSIS — L98.9 SKIN LESION: ICD-10-CM

## 2017-09-06 DIAGNOSIS — N95.2 POSTMENOPAUSAL ATROPHIC VAGINITIS: ICD-10-CM

## 2017-09-06 DIAGNOSIS — Z12.31 SCREENING MAMMOGRAM FOR HIGH-RISK PATIENT: ICD-10-CM

## 2017-09-06 DIAGNOSIS — Z23 IMMUNIZATION DUE: ICD-10-CM

## 2017-09-06 PROCEDURE — 99999 PR PBB SHADOW E&M-EST. PATIENT-LVL III: CPT | Mod: PBBFAC,,, | Performed by: INTERNAL MEDICINE

## 2017-09-06 PROCEDURE — 99397 PER PM REEVAL EST PAT 65+ YR: CPT | Mod: S$GLB,,, | Performed by: INTERNAL MEDICINE

## 2017-09-06 PROCEDURE — 99999 PR PBB SHADOW E&M-EST. PATIENT-LVL IV: CPT | Mod: PBBFAC,,, | Performed by: NURSE PRACTITIONER

## 2017-09-06 PROCEDURE — 99499 UNLISTED E&M SERVICE: CPT | Mod: S$GLB,,, | Performed by: INTERNAL MEDICINE

## 2017-09-06 PROCEDURE — G0009 ADMIN PNEUMOCOCCAL VACCINE: HCPCS | Mod: S$GLB,,, | Performed by: NURSE PRACTITIONER

## 2017-09-06 PROCEDURE — G0402 INITIAL PREVENTIVE EXAM: HCPCS | Mod: S$GLB,,, | Performed by: NURSE PRACTITIONER

## 2017-09-06 PROCEDURE — 99499 UNLISTED E&M SERVICE: CPT | Mod: S$GLB,,, | Performed by: NURSE PRACTITIONER

## 2017-09-06 PROCEDURE — 90670 PCV13 VACCINE IM: CPT | Mod: S$GLB,,, | Performed by: NURSE PRACTITIONER

## 2017-09-06 RX ORDER — ACETAMINOPHEN 500 MG
1000 TABLET ORAL DAILY
COMMUNITY

## 2017-09-06 RX ORDER — CALCIUM CARB/VITAMIN D3/VIT K1 500-500-40
1 TABLET,CHEWABLE ORAL DAILY
COMMUNITY

## 2017-09-06 NOTE — PATIENT INSTRUCTIONS
Counseling and Referral of Other Preventative  (Italic type indicates deductible and co-insurance are waived)    Patient Name: Katiana Hagan  Today's Date: 9/6/2017      SERVICE LIMITATIONS RECOMMENDATION    Vaccines    · Pneumococcal (once after 65)    · Influenza (annually)    · Hepatitis B (if medium/high risk)    · Prevnar 13      Hepatitis B medium/high risk factors:       - End-stage renal disease       - Hemophiliacs who received Factor VII or         IX concentrates       - Clients of institutions for the mentally             retarded       - Persons who live in the same house as          a HepB carrier       - Homosexual men       - Illicit injectable drug abusers     Pneumococcal: Recommended to patient, declined     Influenza: Due fall 2017     Hepatitis B: N/A     Prevnar 13: Done, no repeat necessary    Mammogram (biennial age 50-74)  Annually (age 40 or over)  Scheduled, see appointments    Pap (up to age 70 and after 70 if unknown history or abnormal study last 10 years)    Last done 2016, recommend to repeat every 1  years     The USPSTF recommends against screening for cervical cancer in women older than age 65 years who have had adequate prior screening and are not otherwise at high risk for cervical cancer.      Colorectal cancer screening (to age 75)    · Fecal occult blood test (annual)  · Flexible sigmoidoscopy (5y)  · Screening colonoscopy (10y)  · Barium enema   Fit Kit ordered    Diabetes self-management training (no USPSTF recommendations)  Requires referral by treating physician for patient with diabetes or renal disease. 10 hours of initial DSMT sessions of no less than 30 minutes each in a continuous 12-month period. 2 hours of follow-up DSMT in subsequent years.  N/A    Bone mass measurements (age 65 & older, biennial)  Requires diagnosis related to osteoporosis or estrogen deficiency. Biennial benefit unless patient has history of long-term glucocorticoid  Last done 12/2014,  recommend to repeat every 2-10  years    Glaucoma screening (no USPSTF recommendation)  Diabetes mellitus, family history   , age 50 or over    American, age 65 or over  Done this year, repeat every year    Medical nutrition therapy for diabetes or renal disease (no recommended schedule)  Requires referral by treating physician for patient with diabetes or renal disease or kidney transplant within the past 3 years.  Can be provided in same year as diabetes self-management training (DSMT), and CMS recommends medical nutrition therapy take place after DSMT. Up to 3 hours for initial year and 2 hours in subsequent years.  N/A    Cardiovascular screening blood tests (every 5 years)  · Fasting lipid panel  Order as a panel if possible  Last done 9/2016, recommend to repeat every 1-5  years    Diabetes screening tests (at least every 3 years, Medicare covers annually or at 6-month intervals for prediabetic patients)  · Fasting blood sugar (FBS) or glucose tolerance test (GTT)  Patient must be diagnosed with one of the following:       - Hypertension       - Dyslipidemia       - Obesity (BMI 30kg/m2)       - Previous elevated impaired FBS or GTT       ... or any two of the following:       - Overweight (BMI 25 but <30)       - Family history of diabetes       - Age 65 or older       - History of gestational diabetes or birth of baby weighing more than 9 pounds  Last done 9/2016, recommend to repeat every 1-3  years    HIV screening (annually for increased risk patients)  · HIV-1 and HIV-2 by EIA, or TONO, rapid antibody test or oral mucosa transudate  Patients must be at increased risk for HIV infection per USPSTF guidelines or pregnant. Tests covered annually for patient at increased risk or as requested by the patient. Pregnant patients may receive up to 3 tests during pregnancy.  Risks discussed, screening is not recommended    Smoking cessation counseling (up to 8 sessions per year)  Patients  must be asymptomatic of tobacco-related conditions to receive as a preventative service.  Non-smoker    Subsequent annual wellness visit  At least 12 months since last AWV  Return in one year     The following information is provided to all patients.  This information is to help you find resources for any of the problems found today that may be affecting your health:                Living healthy guide: www.[facility].[place].[facility]      Understanding Diabetes: www.diabetes.org      Eating healthy: www.cdc.gov/healthyweight      CDC home safety checklist: www.cdc.gov/steadi/patient.html      Agency on Aging: www.goea.[place].[facility]      Alcoholics anonymous (AA): www.aa.org      Physical Activity: www.selam.nih.gov/mn0aqfh      Tobacco use: www.quitwithusla.org

## 2017-09-06 NOTE — PROGRESS NOTES
Subjective:       Patient ID: Katiana Hagan is a 71 y.o. female.    Chief Complaint: Annual Exam    History of present illness: 71-year-old female here for yearly physical.  Overall she is doing well.  She still has some issues from her plastic surgery including the left side of her face has some scarring.  She has a few skin lesions in her left posterior scalp that she wanted me to look at but thinks she will show it to her new dermatologist.  She is due for a bone density and would like to get some labs and a stress test.  Her sister who she says is in very similar health, was surprised to have heart disease.  The patient does have dyslipidemia like to get a stress test to make sure she does not have any silent cardiovascular disease.  She denies any chest pain shortness of breath fevers or chills.       Review of Systems   Constitutional: Negative for chills, fatigue, fever and unexpected weight change.   HENT: Negative for sore throat.    Eyes: Negative for pain and visual disturbance.   Respiratory: Negative for apnea, cough, chest tightness and shortness of breath.    Cardiovascular: Negative for chest pain and leg swelling.   Gastrointestinal: Negative for abdominal pain, constipation, diarrhea, nausea and vomiting.   Genitourinary: Negative for frequency, hematuria and menstrual problem.   Musculoskeletal: Negative for arthralgias, joint swelling, neck pain and neck stiffness.   Skin: Negative for rash and wound.        Skin lesion left posterior lateral scalp.  Gets irritated when combing and washing hair   Neurological: Negative for dizziness and headaches.   Hematological: Negative for adenopathy.   Psychiatric/Behavioral: Negative for dysphoric mood. The patient is not nervous/anxious.        Objective:      Physical Exam   Constitutional: She is oriented to person, place, and time. She appears well-developed and well-nourished. No distress.   HENT:   Head: Normocephalic and atraumatic.   Right Ear:  External ear normal.   Left Ear: External ear normal.   Mouth/Throat: Oropharynx is clear and moist. No oropharyngeal exudate.   Eyes: Conjunctivae are normal. Pupils are equal, round, and reactive to light. No scleral icterus.   Neck: Normal range of motion. Neck supple. No thyromegaly present.   Cardiovascular: Normal rate and regular rhythm.    No murmur heard.  Pulmonary/Chest: Effort normal and breath sounds normal. She has no wheezes.   Abdominal: Soft. Bowel sounds are normal. She exhibits no distension. There is no tenderness.   Musculoskeletal: She exhibits no tenderness.   Lymphadenopathy:     She has no cervical adenopathy.   Neurological: She is alert and oriented to person, place, and time.   Skin: No rash noted.   One small hemangioma-like lesion on the left posterior lateral scalp.  Another raised small keratotic lesion.  No signs of bleeding or infection.   Psychiatric: She has a normal mood and affect.       Assessment:       1. Routine physical examination    2. Other and unspecified hyperlipidemia    3. Family history of heart disease    4. Venous insufficiency of both lower extremities    5. Postmenopausal atrophic vaginitis    6. Urge incontinence    7. Skin lesion    8. Postmenopausal        Plan:       Katiana was seen today for annual exam.    Diagnoses and all orders for this visit:    Routine physical examination  -     Lipid panel; Future  -     Basic metabolic panel; Future    Other and unspecified hyperlipidemia  -     Cardiac treadmill stress test; Future  -     Lipid panel; Future  -     Basic metabolic panel; Future    Family history of heart disease  -     Cardiac treadmill stress test; Future    Venous insufficiency of both lower extremities    Postmenopausal atrophic vaginitis  -     conjugated estrogens (PREMARIN) vaginal cream; Place 0.5 g vaginally twice a week. Insert into vagina as directed    Urge incontinence    Skin lesion  -     Ambulatory referral to  Dermatology    Postmenopausal  -     DXA Bone Density Spine And Hip; Future

## 2017-09-12 ENCOUNTER — LAB VISIT (OUTPATIENT)
Dept: LAB | Facility: HOSPITAL | Age: 72
End: 2017-09-12
Payer: MEDICARE

## 2017-09-12 DIAGNOSIS — Z12.11 ENCOUNTER FOR FIT (FECAL IMMUNOCHEMICAL TEST) SCREENING: ICD-10-CM

## 2017-09-12 PROCEDURE — 82274 ASSAY TEST FOR BLOOD FECAL: CPT

## 2017-09-14 LAB — HEMOCCULT STL QL IA: NEGATIVE

## 2017-09-19 ENCOUNTER — HOSPITAL ENCOUNTER (OUTPATIENT)
Dept: RADIOLOGY | Facility: CLINIC | Age: 72
Discharge: HOME | End: 2017-09-19
Attending: INTERNAL MEDICINE
Payer: MEDICARE

## 2017-09-19 ENCOUNTER — HOSPITAL ENCOUNTER (OUTPATIENT)
Dept: RADIOLOGY | Facility: HOSPITAL | Age: 72
Discharge: HOME | End: 2017-09-19
Attending: NURSE PRACTITIONER
Payer: MEDICARE

## 2017-09-19 ENCOUNTER — PATIENT MESSAGE (OUTPATIENT)
Dept: INTERNAL MEDICINE | Facility: CLINIC | Age: 72
End: 2017-09-19

## 2017-09-19 DIAGNOSIS — Z78.0 POSTMENOPAUSAL: ICD-10-CM

## 2017-09-19 DIAGNOSIS — Z12.31 SCREENING MAMMOGRAM FOR HIGH-RISK PATIENT: ICD-10-CM

## 2017-09-19 PROCEDURE — 77063 BREAST TOMOSYNTHESIS BI: CPT | Mod: 26,,, | Performed by: RADIOLOGY

## 2017-09-19 PROCEDURE — 77067 SCR MAMMO BI INCL CAD: CPT | Mod: TC

## 2017-09-19 PROCEDURE — 77067 SCR MAMMO BI INCL CAD: CPT | Mod: 26,,, | Performed by: RADIOLOGY

## 2017-09-19 PROCEDURE — 77080 DXA BONE DENSITY AXIAL: CPT | Mod: 26,,, | Performed by: INTERNAL MEDICINE

## 2017-09-19 PROCEDURE — 77080 DXA BONE DENSITY AXIAL: CPT | Mod: TC

## 2017-09-21 ENCOUNTER — PATIENT MESSAGE (OUTPATIENT)
Dept: INTERNAL MEDICINE | Facility: CLINIC | Age: 72
End: 2017-09-21

## 2017-10-03 ENCOUNTER — PATIENT MESSAGE (OUTPATIENT)
Dept: INTERNAL MEDICINE | Facility: CLINIC | Age: 72
End: 2017-10-03

## 2017-10-03 ENCOUNTER — HOSPITAL ENCOUNTER (OUTPATIENT)
Dept: CARDIOLOGY | Facility: CLINIC | Age: 72
Discharge: HOME | End: 2017-10-03
Payer: MEDICARE

## 2017-10-03 DIAGNOSIS — Z82.49 FAMILY HISTORY OF HEART DISEASE: ICD-10-CM

## 2017-10-03 DIAGNOSIS — E78.5 OTHER AND UNSPECIFIED HYPERLIPIDEMIA: ICD-10-CM

## 2017-10-03 LAB — DIASTOLIC DYSFUNCTION: NO

## 2017-10-03 PROCEDURE — 93015 CV STRESS TEST SUPVJ I&R: CPT | Mod: S$GLB,,, | Performed by: INTERNAL MEDICINE

## 2017-10-27 ENCOUNTER — TELEPHONE (OUTPATIENT)
Dept: INTERNAL MEDICINE | Facility: CLINIC | Age: 72
End: 2017-10-27

## 2017-10-27 NOTE — TELEPHONE ENCOUNTER
----- Message from Lilia German sent at 10/26/2017  4:50 PM CDT -----  Contact: Patient [phone]  Patient wants to know if she should have the shingles vaccination. She is currently taking doxycycline momohydrate 50mg. Stated that she has not had chicken pox.    Please call and advise.    Thank You

## 2017-12-15 ENCOUNTER — OFFICE VISIT (OUTPATIENT)
Dept: DERMATOLOGY | Facility: CLINIC | Age: 72
End: 2017-12-15
Payer: MEDICARE

## 2017-12-15 DIAGNOSIS — L71.9 ROSACEA: Primary | ICD-10-CM

## 2017-12-15 DIAGNOSIS — L90.5 SCAR: ICD-10-CM

## 2017-12-15 PROCEDURE — 99999 PR PBB SHADOW E&M-EST. PATIENT-LVL II: CPT | Mod: PBBFAC,,, | Performed by: DERMATOLOGY

## 2017-12-15 PROCEDURE — 99201 PR OFFICE/OUTPT VISIT,NEW,LEVL I: CPT | Mod: S$GLB,,, | Performed by: DERMATOLOGY

## 2017-12-15 NOTE — PROGRESS NOTES
Subjective:       Patient ID:  Katiana Hagan is a 71 y.o. female who presents for   Chief Complaint   Patient presents with    Lesion     Face     Lesion  - Initial  Affected locations: face  Duration: 1 year  Signs / symptoms: asymptomatic  Severity: mild  Timing: constant  Aggravated by: nothing  Treatments tried: BioCorneum.  Improvement on treatment: no relief    Patient is 70 YO F here for evaluation of several complaints.   Had a facelift in March 2016 by outside plastic surgeon complicated with wound dehiscence on L cheek and infection treated with antibiotics and hyperbaric oxygen per patient. Has healed and left a scar that patient is unhappy with. Also has had some liposuction and microneedling to the areas after facelift. Also is concerned that she has redness in her cheeks that bother her with accentuated area of redness on her left cheek. States it is a linear-like line really hard to see today secondary to the redness in her cheeks today. Is wearing sunscreen daily. Is also bothered by the dimpling on her chin. Not interested in Botox or filler as she does not want the upkeep of coming in nor the cost.   Has flushing. Denies knowing triggers. Denies papules or pustules. Denies any treatments.    Review of Systems   Constitutional: Negative for fever, chills and fatigue.   Skin: Negative for itching and rash.   Hematologic/Lymphatic: Does not bruise/bleed easily.        Objective:    Physical Exam   Constitutional: She appears well-developed and well-nourished.   Neurological: She is alert and oriented to person, place, and time.   Psychiatric: She has a normal mood and affect.   Skin:   Areas Examined (abnormalities noted in diagram):   Scalp / Hair Palpated and Inspected  Head / Face Inspection Performed  Neck Inspection Performed                   Diagram Legend     Erythematous scaling macule/papule c/w actinic keratosis       Vascular papule c/w angioma      Pigmented verrucoid papule/plaque  c/w seborrheic keratosis      Yellow umbilicated papule c/w sebaceous hyperplasia      Irregularly shaped tan macule c/w lentigo     1-2 mm smooth white papules consistent with Milia      Movable subcutaneous cyst with punctum c/w epidermal inclusion cyst      Subcutaneous movable cyst c/w pilar cyst      Firm pink to brown papule c/w dermatofibroma      Pedunculated fleshy papule(s) c/w skin tag(s)      Evenly pigmented macule c/w junctional nevus     Mildly variegated pigmented, slightly irregular-bordered macule c/w mildly atypical nevus      Flesh colored to evenly pigmented papule c/w intradermal nevus       Pink pearly papule/plaque c/w basal cell carcinoma      Erythematous hyperkeratotic cursted plaque c/w SCC      Surgical scar with no sign of skin cancer recurrence      Open and closed comedones      Inflammatory papules and pustules      Verrucoid papule consistent consistent with wart     Erythematous eczematous patches and plaques     Dystrophic onycholytic nail with subungual debris c/w onychomycosis     Umbilicated papule    Erythematous-base heme-crusted tan verrucoid plaque consistent with inflamed seborrheic keratosis     Erythematous Silvery Scaling Plaque c/w Psoriasis     See annotation      Assessment / Plan:        Rosacea  Discussed laser treatment vs. Rhofade.  Pt not currently interested in Rhofade.  Patient instructed in importance of daily broad spectrum sunscreen use with spf at least 30. Sun avoidance and topical protection/protective clothing discussed.    Scar  I counseled pt that we don't offer many cosmetic treatments here at [facility] (besides botox and filler which she is not interested in).  Discussed laser treatment, but informed pt that she would be better served by a cosmetic dermatologist such as Russ Barnett, Toribio, or Tyson. Phone numbers provided.    rtc prn

## 2017-12-15 NOTE — PATIENT INSTRUCTIONS

## 2018-05-02 ENCOUNTER — PES CALL (OUTPATIENT)
Dept: ADMINISTRATIVE | Facility: CLINIC | Age: 73
End: 2018-05-02

## 2018-08-13 ENCOUNTER — PES CALL (OUTPATIENT)
Dept: ADMINISTRATIVE | Facility: CLINIC | Age: 73
End: 2018-08-13

## 2018-09-12 ENCOUNTER — PES CALL (OUTPATIENT)
Dept: ADMINISTRATIVE | Facility: CLINIC | Age: 73
End: 2018-09-12

## 2018-09-13 ENCOUNTER — TELEPHONE (OUTPATIENT)
Dept: OBSTETRICS AND GYNECOLOGY | Facility: CLINIC | Age: 73
End: 2018-09-13

## 2018-09-13 DIAGNOSIS — Z12.39 ENCOUNTER FOR SCREENING BREAST EXAMINATION: Primary | ICD-10-CM

## 2018-09-24 ENCOUNTER — OFFICE VISIT (OUTPATIENT)
Dept: OBSTETRICS AND GYNECOLOGY | Facility: CLINIC | Age: 73
End: 2018-09-24
Attending: OBSTETRICS & GYNECOLOGY
Payer: MEDICARE

## 2018-09-24 ENCOUNTER — OFFICE VISIT (OUTPATIENT)
Dept: INTERNAL MEDICINE | Facility: CLINIC | Age: 73
End: 2018-09-24
Payer: MEDICARE

## 2018-09-24 ENCOUNTER — HOSPITAL ENCOUNTER (OUTPATIENT)
Dept: RADIOLOGY | Facility: HOSPITAL | Age: 73
Discharge: HOME | End: 2018-09-24
Attending: OBSTETRICS & GYNECOLOGY
Payer: MEDICARE

## 2018-09-24 ENCOUNTER — TELEPHONE (OUTPATIENT)
Dept: INTERNAL MEDICINE | Facility: CLINIC | Age: 73
End: 2018-09-24

## 2018-09-24 VITALS
HEIGHT: 69 IN | BODY MASS INDEX: 21.49 KG/M2 | HEART RATE: 67 BPM | WEIGHT: 145.06 LBS | OXYGEN SATURATION: 99 % | SYSTOLIC BLOOD PRESSURE: 108 MMHG | DIASTOLIC BLOOD PRESSURE: 64 MMHG

## 2018-09-24 VITALS
SYSTOLIC BLOOD PRESSURE: 122 MMHG | BODY MASS INDEX: 23.31 KG/M2 | HEIGHT: 66 IN | DIASTOLIC BLOOD PRESSURE: 70 MMHG | WEIGHT: 145.06 LBS

## 2018-09-24 DIAGNOSIS — Z12.39 ENCOUNTER FOR SCREENING BREAST EXAMINATION: ICD-10-CM

## 2018-09-24 DIAGNOSIS — E55.9 VITAMIN D DEFICIENCY: ICD-10-CM

## 2018-09-24 DIAGNOSIS — E78.5 HYPERLIPIDEMIA, UNSPECIFIED HYPERLIPIDEMIA TYPE: ICD-10-CM

## 2018-09-24 DIAGNOSIS — Z00.00 ROUTINE PHYSICAL EXAMINATION: Primary | ICD-10-CM

## 2018-09-24 DIAGNOSIS — M54.9 UPPER BACK PAIN ON LEFT SIDE: ICD-10-CM

## 2018-09-24 DIAGNOSIS — N95.2 POSTMENOPAUSAL ATROPHIC VAGINITIS: ICD-10-CM

## 2018-09-24 DIAGNOSIS — Z01.419 ENCOUNTER FOR GYNECOLOGICAL EXAMINATION WITHOUT ABNORMAL FINDING: Primary | ICD-10-CM

## 2018-09-24 DIAGNOSIS — Z12.31 SCREENING MAMMOGRAM, ENCOUNTER FOR: ICD-10-CM

## 2018-09-24 PROCEDURE — G0101 CA SCREEN;PELVIC/BREAST EXAM: HCPCS | Mod: S$PBB,,, | Performed by: OBSTETRICS & GYNECOLOGY

## 2018-09-24 PROCEDURE — 77063 BREAST TOMOSYNTHESIS BI: CPT | Mod: 26,,, | Performed by: RADIOLOGY

## 2018-09-24 PROCEDURE — 99999 PR PBB SHADOW E&M-EST. PATIENT-LVL III: CPT | Mod: PBBFAC,,, | Performed by: OBSTETRICS & GYNECOLOGY

## 2018-09-24 PROCEDURE — 99999 PR PBB SHADOW E&M-EST. PATIENT-LVL III: CPT | Mod: PBBFAC,,, | Performed by: INTERNAL MEDICINE

## 2018-09-24 PROCEDURE — 99213 OFFICE O/P EST LOW 20 MIN: CPT | Mod: PBBFAC,27,25 | Performed by: INTERNAL MEDICINE

## 2018-09-24 PROCEDURE — 99397 PER PM REEVAL EST PAT 65+ YR: CPT | Mod: S$PBB,,, | Performed by: INTERNAL MEDICINE

## 2018-09-24 PROCEDURE — 77067 SCR MAMMO BI INCL CAD: CPT | Mod: 26,,, | Performed by: RADIOLOGY

## 2018-09-24 PROCEDURE — 77067 SCR MAMMO BI INCL CAD: CPT | Mod: TC

## 2018-09-24 PROCEDURE — G0101 CA SCREEN;PELVIC/BREAST EXAM: HCPCS | Mod: PBBFAC | Performed by: OBSTETRICS & GYNECOLOGY

## 2018-09-24 PROCEDURE — 99213 OFFICE O/P EST LOW 20 MIN: CPT | Mod: PBBFAC,25 | Performed by: OBSTETRICS & GYNECOLOGY

## 2018-09-24 NOTE — PROGRESS NOTES
Subjective:       Patient ID: Katiana Hagan is a 72 y.o. female.    Chief Complaint: Annual Exam and Shoulder Pain    HPI:  Patient here for annual exam.  She also complains of some left upper back soreness near shoulder blade when she is been sitting on her so for or in certain positions awhile.  No rash.  It gets better after either changing positions or some stretching.  She also feels a tiny skin lesion on the back that she would like me to look at.  She has difficulty seeing in a mirror.  She denies any fevers or chills.  No cough or wheeze.  Her left ear feels like it has some wax.  Blood count and blood chemistry are stable but other labs are pending.  She says she does not usually take a flu shot.      Review of Systems   Constitutional: Negative for appetite change, chills and fever.   HENT: Negative for nosebleeds, sinus pain and sore throat.         Left ear fullness, possible wax   Eyes: Negative for visual disturbance.   Respiratory: Negative for cough, shortness of breath and wheezing.    Cardiovascular: Negative for chest pain and leg swelling.   Gastrointestinal: Negative for abdominal pain, constipation and diarrhea.   Genitourinary: Negative for difficulty urinating and hematuria.   Musculoskeletal: Positive for back pain (Left upper back) and myalgias. Negative for neck pain and neck stiffness.   Skin: Negative for pallor and rash.        Skin lesion left upper back   Neurological: Negative for headaches.   Psychiatric/Behavioral: Negative for dysphoric mood and suicidal ideas. The patient is not nervous/anxious.        Objective:      Physical Exam   Constitutional: She is oriented to person, place, and time. She appears well-developed and well-nourished.   HENT:   Head: Normocephalic and atraumatic.   Right Ear: Tympanic membrane, external ear and ear canal normal.   Left Ear: Tympanic membrane, external ear and ear canal normal.   Nose: Nose normal.   Mouth/Throat: Oropharynx is clear and  moist and mucous membranes are normal. No oropharyngeal exudate.   Eyes: Conjunctivae and EOM are normal. Pupils are equal, round, and reactive to light. Right eye exhibits no discharge. Left eye exhibits no discharge.   Neck: Normal range of motion. Neck supple. No JVD present. No thyromegaly present.   Cardiovascular: Normal rate, regular rhythm, normal heart sounds and intact distal pulses.   No murmur heard.  Pulmonary/Chest: Effort normal and breath sounds normal. No respiratory distress. She has no wheezes. She has no rales.   Abdominal: Soft. Bowel sounds are normal. She exhibits no mass. There is no tenderness.   Musculoskeletal: Normal range of motion. She exhibits no edema or tenderness.        Arms:  Muscular soreness along the medial aspect of the clavicle with deep palpation.   Lymphadenopathy:     She has no cervical adenopathy.        Right: No supraclavicular adenopathy present.        Left: No supraclavicular adenopathy present.   Neurological: She is alert and oriented to person, place, and time. She has normal reflexes. She displays normal reflexes. No cranial nerve deficit. Coordination normal.   Skin: No rash noted.   Two tiny skin tags under the left half of bra strap.  See photo.  Nontender and not related to upper back pain   Psychiatric: She has a normal mood and affect. Her behavior is normal. She does not exhibit a depressed mood.               Assessment:       1. Routine physical examination    2. Vitamin D deficiency    3. Hyperlipidemia, unspecified hyperlipidemia type    4. Upper back pain on left side        Plan:       Katiana was seen today for annual exam and shoulder pain.    Diagnoses and all orders for this visit:    Routine physical examination    Vitamin D deficiency    Hyperlipidemia, unspecified hyperlipidemia type    Upper back pain on left side        review remaining labs.  Continue current meds.  Heat, stretching for upper back

## 2018-09-24 NOTE — TELEPHONE ENCOUNTER
3pm patient requesting labs. Please review pending labs. Pt would like to have labs done before appointment

## 2018-10-02 ENCOUNTER — PES CALL (OUTPATIENT)
Dept: ADMINISTRATIVE | Facility: CLINIC | Age: 73
End: 2018-10-02

## 2018-10-11 ENCOUNTER — PATIENT MESSAGE (OUTPATIENT)
Dept: INTERNAL MEDICINE | Facility: CLINIC | Age: 73
End: 2018-10-11

## 2018-10-11 ENCOUNTER — PATIENT MESSAGE (OUTPATIENT)
Dept: OBSTETRICS AND GYNECOLOGY | Facility: CLINIC | Age: 73
End: 2018-10-11

## 2018-10-12 DIAGNOSIS — N76.0 VULVOVAGINITIS: Primary | ICD-10-CM

## 2018-10-12 RX ORDER — FLUCONAZOLE 150 MG/1
150 TABLET ORAL ONCE
Qty: 1 TABLET | Refills: 0 | Status: SHIPPED | OUTPATIENT
Start: 2018-10-12 | End: 2018-10-12

## 2019-02-26 ENCOUNTER — PES CALL (OUTPATIENT)
Dept: ADMINISTRATIVE | Facility: CLINIC | Age: 74
End: 2019-02-26

## 2019-03-14 ENCOUNTER — TELEPHONE (OUTPATIENT)
Dept: INTERNAL MEDICINE | Facility: CLINIC | Age: 74
End: 2019-03-14

## 2019-03-14 NOTE — TELEPHONE ENCOUNTER
----- Message from Macy Mueller sent at 3/14/2019  2:09 PM CDT -----  Contact: Patient / [phone] or [phone]   Pt states that she is calling to speak with . She states that she is having some problems with her head and neck. She states that it may possibly she's not really sure. She states that she may need a CT Scan or something. But she wants to get some advice as to what steps to take. Please call back and advise.      Thanks

## 2019-03-22 ENCOUNTER — NURSE TRIAGE (OUTPATIENT)
Dept: ADMINISTRATIVE | Facility: CLINIC | Age: 74
End: 2019-03-22

## 2019-03-22 ENCOUNTER — TELEPHONE (OUTPATIENT)
Dept: INTERNAL MEDICINE | Facility: CLINIC | Age: 74
End: 2019-03-22

## 2019-03-22 NOTE — TELEPHONE ENCOUNTER
I spoke with Ms. Hagan and she states she has been experiencing pain and numbness of the left side of her head and neck behind her ear for quite sometime and she is requesting an MRI.

## 2019-03-22 NOTE — TELEPHONE ENCOUNTER
Reason for Disposition   [1] Caller requesting NON-URGENT health information AND [2] PCP's office is the best resource    Protocols used: ST INFORMATION ONLY CALL-A-AH  pt states she needs referral for pain in neck (not new). Pt states L side of head numb ongoing for a long time. Pt would like a scan. Afeb, no numbness or weakness of arms or legs. Spoke with Amrit at Dr Ren - nurse states she will call pt right back from desk. Pt notified.
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
## 2019-03-26 ENCOUNTER — OFFICE VISIT (OUTPATIENT)
Dept: INTERNAL MEDICINE | Facility: CLINIC | Age: 74
End: 2019-03-26
Payer: MEDICARE

## 2019-03-26 VITALS
OXYGEN SATURATION: 98 % | BODY MASS INDEX: 23.77 KG/M2 | SYSTOLIC BLOOD PRESSURE: 120 MMHG | DIASTOLIC BLOOD PRESSURE: 78 MMHG | HEIGHT: 67 IN | WEIGHT: 151.44 LBS | HEART RATE: 77 BPM

## 2019-03-26 DIAGNOSIS — M54.12 LEFT CERVICAL RADICULOPATHY: ICD-10-CM

## 2019-03-26 DIAGNOSIS — M54.2 NECK PAIN: ICD-10-CM

## 2019-03-26 DIAGNOSIS — M54.2 NECK PAIN ON LEFT SIDE: ICD-10-CM

## 2019-03-26 DIAGNOSIS — R20.0 NUMBNESS: Primary | ICD-10-CM

## 2019-03-26 PROCEDURE — 1101F PR PT FALLS ASSESS DOC 0-1 FALLS W/OUT INJ PAST YR: ICD-10-PCS | Mod: HCNC,CPTII,S$GLB, | Performed by: INTERNAL MEDICINE

## 2019-03-26 PROCEDURE — 99214 PR OFFICE/OUTPT VISIT, EST, LEVL IV, 30-39 MIN: ICD-10-PCS | Mod: HCNC,S$GLB,, | Performed by: INTERNAL MEDICINE

## 2019-03-26 PROCEDURE — 99214 OFFICE O/P EST MOD 30 MIN: CPT | Mod: HCNC,S$GLB,, | Performed by: INTERNAL MEDICINE

## 2019-03-26 PROCEDURE — 99999 PR PBB SHADOW E&M-EST. PATIENT-LVL IV: CPT | Mod: PBBFAC,HCNC,, | Performed by: INTERNAL MEDICINE

## 2019-03-26 PROCEDURE — 1101F PT FALLS ASSESS-DOCD LE1/YR: CPT | Mod: HCNC,CPTII,S$GLB, | Performed by: INTERNAL MEDICINE

## 2019-03-26 PROCEDURE — 99999 PR PBB SHADOW E&M-EST. PATIENT-LVL IV: ICD-10-PCS | Mod: PBBFAC,HCNC,, | Performed by: INTERNAL MEDICINE

## 2019-03-29 ENCOUNTER — HOSPITAL ENCOUNTER (OUTPATIENT)
Dept: RADIOLOGY | Facility: HOSPITAL | Age: 74
Discharge: HOME | End: 2019-03-29
Attending: INTERNAL MEDICINE
Payer: MEDICARE

## 2019-03-29 DIAGNOSIS — R20.0 NUMBNESS: ICD-10-CM

## 2019-03-29 DIAGNOSIS — M54.2 NECK PAIN: ICD-10-CM

## 2019-03-29 DIAGNOSIS — M54.2 NECK PAIN ON LEFT SIDE: ICD-10-CM

## 2019-03-29 DIAGNOSIS — M54.12 LEFT CERVICAL RADICULOPATHY: ICD-10-CM

## 2019-03-29 PROCEDURE — 72141 MRI NECK SPINE W/O DYE: CPT | Mod: TC,HCNC

## 2019-03-29 PROCEDURE — 72141 MRI CERVICAL SPINE WITHOUT CONTRAST: ICD-10-PCS | Mod: 26,HCNC,, | Performed by: RADIOLOGY

## 2019-03-29 PROCEDURE — 72141 MRI NECK SPINE W/O DYE: CPT | Mod: 26,HCNC,, | Performed by: RADIOLOGY

## 2019-03-31 ENCOUNTER — PATIENT MESSAGE (OUTPATIENT)
Dept: INTERNAL MEDICINE | Facility: CLINIC | Age: 74
End: 2019-03-31

## 2019-04-01 ENCOUNTER — TELEPHONE (OUTPATIENT)
Dept: INTERNAL MEDICINE | Facility: CLINIC | Age: 74
End: 2019-04-01

## 2019-04-01 DIAGNOSIS — G62.9 NEUROPATHY: Primary | ICD-10-CM

## 2019-04-01 DIAGNOSIS — R51.9 FACE PAIN: ICD-10-CM

## 2019-04-01 NOTE — TELEPHONE ENCOUNTER
Patient did not read her portal message yesterday. I let her know her results she verbally understood she is requesting to see Neuro to see how much nerve damage she has. Patient has seen Dr. Galvez in the past.

## 2019-04-01 NOTE — TELEPHONE ENCOUNTER
----- Message from George Baker sent at 4/1/2019 11:02 AM CDT -----  Contact: self/[phone]  Type: Test Results    What test was performed? MRI of Spine     Who ordered the test?     When and where were the test performed? [facility]     Comments: Please advise.        Thank You

## 2019-04-01 NOTE — TELEPHONE ENCOUNTER
Called Neurology to schedule an appointment left a message with Dr. Galvez's staff to call Ms. Hagan to schedule her visit.

## 2019-04-10 ENCOUNTER — OFFICE VISIT (OUTPATIENT)
Dept: NEUROLOGY | Facility: CLINIC | Age: 74
End: 2019-04-10
Payer: MEDICARE

## 2019-04-10 VITALS — BODY MASS INDEX: 22.44 KG/M2 | HEIGHT: 67 IN | WEIGHT: 143 LBS

## 2019-04-10 DIAGNOSIS — M54.2 NECK PAIN: Primary | ICD-10-CM

## 2019-04-10 PROCEDURE — 1101F PT FALLS ASSESS-DOCD LE1/YR: CPT | Mod: HCNC,CPTII,S$GLB, | Performed by: PSYCHIATRY & NEUROLOGY

## 2019-04-10 PROCEDURE — 99999 PR PBB SHADOW E&M-EST. PATIENT-LVL III: CPT | Mod: PBBFAC,HCNC,, | Performed by: PSYCHIATRY & NEUROLOGY

## 2019-04-10 PROCEDURE — 99213 OFFICE O/P EST LOW 20 MIN: CPT | Mod: HCNC,S$GLB,, | Performed by: PSYCHIATRY & NEUROLOGY

## 2019-04-10 PROCEDURE — 99999 PR PBB SHADOW E&M-EST. PATIENT-LVL III: ICD-10-PCS | Mod: PBBFAC,HCNC,, | Performed by: PSYCHIATRY & NEUROLOGY

## 2019-04-10 PROCEDURE — 1101F PR PT FALLS ASSESS DOC 0-1 FALLS W/OUT INJ PAST YR: ICD-10-PCS | Mod: HCNC,CPTII,S$GLB, | Performed by: PSYCHIATRY & NEUROLOGY

## 2019-04-10 PROCEDURE — 99213 PR OFFICE/OUTPT VISIT, EST, LEVL III, 20-29 MIN: ICD-10-PCS | Mod: HCNC,S$GLB,, | Performed by: PSYCHIATRY & NEUROLOGY
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
## 2019-04-10 NOTE — LETTER
April 10, 2019      Tucker Ren MD  [address]  [place] [address]           [facility]  [address]  [facility] [address]  Phone: [phone]          Patient: Katiana Hagan   MR Number: [id number]   YOB: 1945   Date of Visit: 4/10/2019       Dear Dr. Tucker Ren:    Thank you for referring Katiana Hagan to me for evaluation. Attached you will find relevant portions of my assessment and plan of care.    If you have questions, please do not hesitate to call me. I look forward to following Katiana Hagan along with you.    Sincerely,    Kody Garcia MD    Enclosure  CC:  No Recipients    If you would like to receive this communication electronically, please contact [email or web address] or [phone] to request more information on [facility] access.    For providers and/or their staff who would like to refer a patient to [facility], please contact us through our one-stop-shop provider referral line, [facility], at [phone].    If you feel you have received this communication in error or would no longer like to receive these types of communications, please e-mail [email or web address]

## 2019-06-17 ENCOUNTER — TELEPHONE (OUTPATIENT)
Dept: OBSTETRICS AND GYNECOLOGY | Facility: CLINIC | Age: 74
End: 2019-06-17

## 2019-06-17 DIAGNOSIS — N76.0 VULVOVAGINITIS: Primary | ICD-10-CM

## 2019-06-17 RX ORDER — FLUCONAZOLE 150 MG/1
150 TABLET ORAL ONCE
Qty: 2 TABLET | Refills: 0 | Status: SHIPPED | OUTPATIENT
Start: 2019-06-17 | End: 2019-06-17

## 2019-06-17 NOTE — TELEPHONE ENCOUNTER
Please let her know that a RX was sent , however if not resolved in about a week , will need to be seen for a problem visit.

## 2019-06-17 NOTE — TELEPHONE ENCOUNTER
Returned Patient call in regards to yeast infection. She would like to  send her something to help treat. She is having itching, and yeast. Annual not due until 9/2019

## 2019-06-17 NOTE — TELEPHONE ENCOUNTER
Dr. Quijano called in  in prescription, patient verbalized understanding that if symptoms do not improve, patient needs to dhiraj appointment to be seen

## 2019-08-06 ENCOUNTER — PES CALL (OUTPATIENT)
Dept: ADMINISTRATIVE | Facility: CLINIC | Age: 74
End: 2019-08-06

## 2019-08-08 ENCOUNTER — NURSE TRIAGE (OUTPATIENT)
Dept: ADMINISTRATIVE | Facility: CLINIC | Age: 74
End: 2019-08-08

## 2019-08-08 ENCOUNTER — HOSPITAL ENCOUNTER (EMERGENCY)
Facility: HOSPITAL | Age: 74
Discharge: HOME | End: 2019-08-08
Attending: EMERGENCY MEDICINE
Payer: MEDICARE

## 2019-08-08 VITALS
DIASTOLIC BLOOD PRESSURE: 66 MMHG | BODY MASS INDEX: 24.11 KG/M2 | TEMPERATURE: 98 F | RESPIRATION RATE: 17 BRPM | WEIGHT: 150 LBS | HEART RATE: 62 BPM | HEIGHT: 66 IN | SYSTOLIC BLOOD PRESSURE: 138 MMHG | OXYGEN SATURATION: 94 %

## 2019-08-08 DIAGNOSIS — R07.9 CHEST PAIN: ICD-10-CM

## 2019-08-08 DIAGNOSIS — M54.9 BACK PAIN: ICD-10-CM

## 2019-08-08 DIAGNOSIS — M51.34 DEGENERATIVE DISC DISEASE, THORACIC: Primary | ICD-10-CM

## 2019-08-08 LAB
ALBUMIN SERPL BCP-MCNC: 4.2 G/DL (ref 3.5–5.2)
ALP SERPL-CCNC: 80 U/L (ref 55–135)
ALT SERPL W/O P-5'-P-CCNC: 27 U/L (ref 10–44)
ANION GAP SERPL CALC-SCNC: 6 MMOL/L (ref 8–16)
AST SERPL-CCNC: 20 U/L (ref 10–40)
BASOPHILS # BLD AUTO: 0.04 K/UL (ref 0–0.2)
BASOPHILS NFR BLD: 0.8 % (ref 0–1.9)
BILIRUB SERPL-MCNC: 0.4 MG/DL (ref 0.1–1)
BNP SERPL-MCNC: 37 PG/ML (ref 0–99)
BUN SERPL-MCNC: 15 MG/DL (ref 8–23)
CALCIUM SERPL-MCNC: 10.4 MG/DL (ref 8.7–10.5)
CHLORIDE SERPL-SCNC: 101 MMOL/L (ref 95–110)
CO2 SERPL-SCNC: 32 MMOL/L (ref 23–29)
CREAT SERPL-MCNC: 0.9 MG/DL (ref 0.5–1.4)
D DIMER PPP IA.FEU-MCNC: 0.27 MG/L FEU
DIFFERENTIAL METHOD: NORMAL
EOSINOPHIL # BLD AUTO: 0.1 K/UL (ref 0–0.5)
EOSINOPHIL NFR BLD: 2.6 % (ref 0–8)
ERYTHROCYTE [DISTWIDTH] IN BLOOD BY AUTOMATED COUNT: 13.3 % (ref 11.5–14.5)
EST. GFR  (AFRICAN AMERICAN): >60 ML/MIN/1.73 M^2
EST. GFR  (NON AFRICAN AMERICAN): >60 ML/MIN/1.73 M^2
GLUCOSE SERPL-MCNC: 90 MG/DL (ref 70–110)
HCT VFR BLD AUTO: 45.4 % (ref 37–48.5)
HGB BLD-MCNC: 14.7 G/DL (ref 12–16)
LYMPHOCYTES # BLD AUTO: 1.6 K/UL (ref 1–4.8)
LYMPHOCYTES NFR BLD: 32 % (ref 18–48)
MCH RBC QN AUTO: 28.8 PG (ref 27–31)
MCHC RBC AUTO-ENTMCNC: 32.4 G/DL (ref 32–36)
MCV RBC AUTO: 89 FL (ref 82–98)
MONOCYTES # BLD AUTO: 0.7 K/UL (ref 0.3–1)
MONOCYTES NFR BLD: 14 % (ref 4–15)
NEUTROPHILS # BLD AUTO: 2.5 K/UL (ref 1.8–7.7)
NEUTROPHILS NFR BLD: 50.8 % (ref 38–73)
PLATELET # BLD AUTO: 201 K/UL (ref 150–350)
PLATELET BLD QL SMEAR: NORMAL
PMV BLD AUTO: 10.3 FL (ref 9.2–12.9)
POTASSIUM SERPL-SCNC: 3.8 MMOL/L (ref 3.5–5.1)
PROT SERPL-MCNC: 7 G/DL (ref 6–8.4)
RBC # BLD AUTO: 5.1 M/UL (ref 4–5.4)
SODIUM SERPL-SCNC: 139 MMOL/L (ref 136–145)
TROPONIN I SERPL DL<=0.01 NG/ML-MCNC: <0.006 NG/ML (ref 0–0.03)
WBC # BLD AUTO: 4.93 K/UL (ref 3.9–12.7)

## 2019-08-08 PROCEDURE — 83880 ASSAY OF NATRIURETIC PEPTIDE: CPT | Mod: HCNC

## 2019-08-08 PROCEDURE — 93010 ELECTROCARDIOGRAM REPORT: CPT | Mod: HCNC,,, | Performed by: INTERNAL MEDICINE

## 2019-08-08 PROCEDURE — 93005 ELECTROCARDIOGRAM TRACING: CPT | Mod: HCNC

## 2019-08-08 PROCEDURE — 80053 COMPREHEN METABOLIC PANEL: CPT | Mod: HCNC

## 2019-08-08 PROCEDURE — 85379 FIBRIN DEGRADATION QUANT: CPT | Mod: HCNC

## 2019-08-08 PROCEDURE — 93010 EKG 12-LEAD: ICD-10-PCS | Mod: HCNC,,, | Performed by: INTERNAL MEDICINE

## 2019-08-08 PROCEDURE — 84484 ASSAY OF TROPONIN QUANT: CPT | Mod: HCNC

## 2019-08-08 PROCEDURE — 99285 EMERGENCY DEPT VISIT HI MDM: CPT | Mod: HCNC,25

## 2019-08-08 PROCEDURE — 85025 COMPLETE CBC W/AUTO DIFF WBC: CPT | Mod: HCNC

## 2019-08-08 RX ORDER — MELOXICAM 7.5 MG/1
7.5 TABLET ORAL DAILY PRN
Qty: 15 TABLET | Refills: 0 | Status: SHIPPED | OUTPATIENT
Start: 2019-08-08 | End: 2019-09-04

## 2019-08-08 RX ORDER — CYCLOBENZAPRINE HCL 5 MG
5 TABLET ORAL 3 TIMES DAILY PRN
Qty: 15 TABLET | Refills: 0 | Status: SHIPPED | OUTPATIENT
Start: 2019-08-08 | End: 2019-10-16

## 2019-08-08 RX ORDER — ASPIRIN 325 MG
325 TABLET ORAL
Status: DISCONTINUED | OUTPATIENT
Start: 2019-08-08 | End: 2019-08-08 | Stop reason: HOSPADM

## 2019-08-08 NOTE — DISCHARGE INSTRUCTIONS
Your lab tests are within acceptable limits and do not suggest an acute heart attack as the cause of your symptoms. X-ray shows some degeneration in your thoracic spine that may be responsible for your symptoms. It is very important that you make an appointment to follow up with your primary physician as well as an orthopedic spine specialist.  Use anti-inflammatory medications such as the prescribed meloxicam, ibuprofen or Aleve as needed for pain.  Do not use meloxicam, ibuprofen or Aleve together.  Use Flexeril as needed for muscle spasm associated with your back pain. Do not drive or operate heavy machinery while taking Flexeril as it can cause you to become drowsy and decrease her coordination.  Return to the emergency department for persistent or worsening chest pain, breathing difficulty, numbness, weakness, fever or any new, worsening or concerning symptoms.

## 2019-08-08 NOTE — TELEPHONE ENCOUNTER
"    Reason for Disposition   Pain also present in shoulder(s) or arm(s) or jaw    Protocols used: CHEST PAIN-A-[place]    Katiana reports chest pain for the last 2 weeks.  It "comes and goes, but coming more frequently now.  Pain is 4-5 of 10, but can become 8-9 of 10 often.  Lasts about 2-3 minutes each time.  The pain radiates to the shoulder blade, left side, in her upper back.  Per [facility] triage protocol, recommend ED now for evaluation.  She states she will go to [place] ED location "after I get my car, but it's really not bad now".  Encouraged her to go now, but she will wait, though did agree to call 911 if symptoms worsen.  Message to Tucker Ren MD, pcp.  Please contact caller directly with any additional care advice.    "

## 2019-08-08 NOTE — ED TRIAGE NOTES
"Patient reports on and off chest pressure, back pain that has been going on "for a while"  Today felt like "when you go to the dentist and they put that lead apron on for xrays"  No cardiac history per patient  Patient is alert and oriented and good historian of medical history  Even and unlabored respirations with no shortness of breath  Patient placed on cardiac monitor with NSR noted and no ectopy  Regular heart rate with no rubs, murmurs or extra heart sounds present  Lungs are clear in all lobes  No acute distress noted on arrival  "

## 2019-08-08 NOTE — ED PROVIDER NOTES
"Encounter Date: 8/8/2019    SCRIBE #1 NOTE: I, Eric El, am scribing for, and in the presence of,  Tanvir Appiah MD. I have scribed the following portions of the note - Other sections scribed: HPI, ROS, PE.       History     Chief Complaint   Patient presents with    Chest Pain     Pt reports to ED with c/o intermittent chest tightness x2 weeks. Reports pain is underneath left breast and radiates through back. Denies SOB.      CC: Chest Pain     73 year old female  has a past medical history of Other and unspecified hyperlipidemia presents to the ED for evaluation of a 2 week history of mild (3/10) chest pain. Pt also reports a history of chronic pack pain that is worse than normal over the last few days. Pt reports the chest pain sometimes feels like a "pressure across my chest" and reports intermittent left sided chest pain. Pt went to her PCP today and was sent to the ER for evaluation. She denies any preceding trauma or heavy lifting. Pt also denies shortness of breath, leg swelling, cough, fever, excessive diaphoresis, nausea, vomiting, abdominal pain, and dysuria. Pt does report feeling increased anxiety recently. She denies exacerbating or alleviating factors. Pt's sister had stents placed last year. Pt denies a personal history of heart problems or blood clots. No family history of blood clots reported. Pt does not take daily medications. She does not smoke. No other symptoms reported.       The history is provided by the patient. No  was used.     Review of patient's allergies indicates:   Allergen Reactions    Hydrocortisone Itching    Methylprednisolone aceponate     Prednisone Nausea And Vomiting    Tixocortol pivalate Itching     Past Medical History:   Diagnosis Date    Other and unspecified hyperlipidemia 10/29/2013     Past Surgical History:   Procedure Laterality Date    APPENDECTOMY  age 18    Breast augmentation  1988    COSMETIC SURGERY  03/08/2016    " facelift    DILATION AND CURETTAGE OF UTERUS      MYOMECTOMY       Family History   Problem Relation Age of Onset    Breast cancer Maternal Aunt     No Known Problems Father     No Known Problems Mother     Diabetes Sister     Schizophrenia Son     Heart disease Sister     No Known Problems Son     Colon cancer Neg Hx     Hypertension Neg Hx     Ovarian cancer Neg Hx     Stroke Neg Hx      Social History     Tobacco Use    Smoking status: Never Smoker    Smokeless tobacco: Never Used   Substance Use Topics    Alcohol use: Yes     Alcohol/week: 1.2 oz     Types: 2 Glasses of wine per week     Comment: 1-2 drinks weekly - wine or rodrigue    Drug use: No     Review of Systems   Constitutional: Negative for fever.   HENT: Negative for sore throat.    Respiratory: Negative for shortness of breath.    Cardiovascular: Positive for chest pain.   Gastrointestinal: Negative for nausea.   Genitourinary: Negative for dysuria.   Musculoskeletal: Positive for back pain.   Skin: Negative for rash.   Neurological: Negative for weakness.   Hematological: Does not bruise/bleed easily.       Physical Exam     Initial Vitals [08/08/19 1401]   BP Pulse Resp Temp SpO2   (!) 155/64 74 18 97.8 °F (36.6 °C) 97 %      MAP       --         Physical Exam    Nursing note and vitals reviewed.  Constitutional: She is not diaphoretic. No distress.   HENT:   Head: Normocephalic and atraumatic.   Mouth/Throat: Oropharynx is clear and moist.   Eyes: EOM are normal. Pupils are equal, round, and reactive to light. No scleral icterus.   Neck: Normal range of motion. Neck supple. No JVD present.   Cardiovascular: Normal rate, regular rhythm and intact distal pulses.   Symmetrical distal pulses   Pulmonary/Chest: Breath sounds normal. No stridor. No respiratory distress.   Abdominal: Soft. Bowel sounds are normal. She exhibits no distension. There is no tenderness.   Musculoskeletal: Normal range of motion. She exhibits tenderness (left  paraspinal). She exhibits no edema.   No midline vertebral tenderness   Neurological: She is alert and oriented to person, place, and time. She has normal strength. No cranial nerve deficit or sensory deficit.   Skin: Skin is warm and dry.   Psychiatric: She has a normal mood and affect.         ED Course   Procedures  Labs Reviewed   COMPREHENSIVE METABOLIC PANEL - Abnormal; Notable for the following components:       Result Value    CO2 32 (*)     Anion Gap 6 (*)     All other components within normal limits   CBC W/ AUTO DIFFERENTIAL   TROPONIN I   B-TYPE NATRIURETIC PEPTIDE   D DIMER, QUANTITATIVE     EKG Readings: (Independently Interpreted)   Initial Reading: No STEMI. Rhythm: Normal Sinus Rhythm. Heart Rate: 77. Ectopy: No Ectopy. Conduction: Normal. T Waves: Normal.     ECG Results          EKG 12-lead (In process)  Result time 08/08/19 17:25:36    In process by Interface, Lab In [facility] (08/08/19 17:25:36)                 Narrative:    Test Reason : R07.9,    Vent. Rate : 063 BPM     Atrial Rate : 063 BPM     P-R Int : 166 ms          QRS Dur : 076 ms      QT Int : 412 ms       P-R-T Axes : 060 088 064 degrees     QTc Int : 421 ms    Normal sinus rhythm  Possible Anterior infarct ,age undetermined  Abnormal ECG  When compared with ECG of 03-OCT-2017 11:31,  No significant change was found    Referred By: SAMUEL VELASQUEZ           Confirmed By:                   In process by Interface, Lab In [facility] (08/08/19 17:25:17)                 Narrative:    Test Reason : R07.9,    Vent. Rate : 063 BPM     Atrial Rate : 063 BPM     P-R Int : 166 ms          QRS Dur : 076 ms      QT Int : 412 ms       P-R-T Axes : 060 088 064 degrees     QTc Int : 421 ms    Normal sinus rhythm  Possible Anterior infarct ,age undetermined  Abnormal ECG  When compared with ECG of 03-OCT-2017 11:31,  No significant change was found    Referred By: SAMUEL VELASQUEZ           Confirmed By:                   In process by Interface, Lab In  [facility] (08/08/19 17:17:16)                 Narrative:    Test Reason : R07.9,    Vent. Rate : 063 BPM     Atrial Rate : 063 BPM     P-R Int : 166 ms          QRS Dur : 076 ms      QT Int : 412 ms       P-R-T Axes : 060 088 064 degrees     QTc Int : 421 ms    Normal sinus rhythm  Possible Anterior infarct (cited on or before 08-AUG-2019)  Abnormal ECG  When compared with ECG of 08-AUG-2019 13:59,  No significant change was found    Referred By: SAMUEL VELASQUEZ           Confirmed By:                             Imaging Results          X-Ray Thoracic Spine AP Lateral (Final result)  Result time 08/08/19 15:30:56    Final result by Sameer Wade MD (08/08/19 15:30:56)                 Impression:      No acute process.      Electronically signed by: Sameer Wade MD  Date:    08/08/2019  Time:    15:30             Narrative:    EXAMINATION:  XR THORACIC SPINE AP LATERAL    CLINICAL HISTORY:  Dorsalgia, unspecified    TECHNIQUE:  AP and lateral views of the thoracic spine were performed.    COMPARISON:  Chest x-ray dated 08/08/2019.    FINDINGS:  The thoracic alignment is within normal limits.  The vertebral body heights are maintained.  The posterior elements are within normal limits.  The intervertebral disc spaces are unremarkable.  The paraspinal soft tissues are within normal limits.  There is no evidence of acute fracture or listhesis of the thoracic spine.                               X-Ray Chest PA And Lateral (Final result)  Result time 08/08/19 15:17:55    Final result by Edwin Nice MD (08/08/19 15:17:55)                 Impression:      1. Grossly stable chronic interstitial findings, no large focal consolidation.      Electronically signed by: Edwin Nice MD  Date:    08/08/2019  Time:    15:17             Narrative:    EXAMINATION:  XR CHEST PA AND LATERAL    CLINICAL HISTORY:  Chest pain, unspecified    TECHNIQUE:  PA and lateral views of the chest were  performed.    COMPARISON:  12/02/2014    FINDINGS:  The cardiomediastinal silhouette is not enlarged.  There is no pleural effusion.  The trachea is midline.  The lungs are symmetrically expanded bilaterally with minimally coarse interstitial attenuation.  No large focal consolidation seen.  There is no pneumothorax.  The osseous structures are remarkable for degenerative changes..                              X-Rays:   Independently Interpreted Readings:   Chest X-Ray: No infiltrates.  No acute abnormalities.     Medical Decision Making:   History:   Old Medical Records: I decided to obtain old medical records.  Old Records Summarized: records from clinic visits.       <> Summary of Records: Seen by Neurosurgery in 04/2019 for neck pain.  MRI showing a cervical disc disease.  Initial Assessment:   A 73-year-old female with history of hyperlipidemia presents for evaluation of to Samantha intermittent chest pain with associated back pain EKG is without definite acute ischemic changes.  Physical exam is notable for left paraspinal tenderness.  Differential Diagnosis:   PE, pneumonia, ACS, muscoskeletal pain   Independently Interpreted Test(s):   I have ordered and independently interpreted X-rays - see prior notes.  I have ordered and independently interpreted EKG Reading(s) - see prior notes  Clinical Tests:   Lab Tests: Ordered and Reviewed  Radiological Study: Ordered and Reviewed  Medical Tests: Ordered and Reviewed  ED Management:  Labs within acceptable limits with negative troponin, negative D-dimer.  Chest x-ray does not show pneumonia.  Thoracic spine x-ray radiology read reports unremarkable intervertebral disc spaces however on my evaluation there appears to be narrowing of the disc spaces with some osteophyte suggestive of vertebral disc disease.  Patient has a heart score of 3 indicating a less than 2% chance of adverse cardiac event.  I have low clinical suspicion of PE or aortic dissection this patient.  On  reassessment she reports feeling well and requests discharge she is stable for discharge to follow up with her primary physician as well as spine. counseled on supportive care, appropriate medication usage, concerning symptoms for which to return to ER and the importance of follow up. Understanding and agreement with treatment plan was expressed.   This chart was completed using dictation software, as a result there may be some transcription errors.     Additional MDM:   Heart Score:    History:          Slightly suspicious.  ECG:             Normal  Age:               >65 years  Risk factors: 1-2 risk factors  Troponin:       Less than or equal to normal limit  Final Score: 3                       Clinical Impression:       ICD-10-CM ICD-9-CM   1. Degenerative disc disease, thoracic M51.34 722.51   2. Chest pain R07.9 786.50   3. Back pain M54.9 724.5         Disposition:   Disposition: Discharged  Condition: Stable                        Tanvir Appiah MD  08/08/19 7236

## 2019-08-09 ENCOUNTER — TELEPHONE (OUTPATIENT)
Dept: INTERNAL MEDICINE | Facility: CLINIC | Age: 74
End: 2019-08-09

## 2019-08-09 NOTE — TELEPHONE ENCOUNTER
Pt states she will keep scheduled appointment. Pt states she was seen in the ER yesterday. She was referred to back and spine clinic. She attempted to make an appointment, but when she called the schedulers were offering her to see PA's and NP's. Pt wants to see an MD. She wants someone with experience and wants to know if there is someone you'd reccomend

## 2019-08-09 NOTE — TELEPHONE ENCOUNTER
----- Message from Juan Manuel Kennedy sent at 8/8/2019 10:02 AM CDT -----  Contact: Pt [phone]  Caller is requesting a sooner appointment. Caller declined first available appointment listed below. Caller will not accept being placed on the wait list and is requesting a message be sent to the provider.    When is the next available appointment:  8/13/19  Did you offer to schedule the next available appt and put the patient on the wait list?:   Yes  What visit type: EP  Symptoms:  Upper back pain and chest pains off and on for 2 weeks. No pain today  Patient preference of timeframe to be scheduled:  Any day any time after 9am  What is the reason the patient is requesting a sooner appointment? (insurance terminating, changing jobs):  She only wants to see you    Comments:

## 2019-08-13 ENCOUNTER — OFFICE VISIT (OUTPATIENT)
Dept: INTERNAL MEDICINE | Facility: CLINIC | Age: 74
End: 2019-08-13
Payer: MEDICARE

## 2019-08-13 VITALS
HEART RATE: 75 BPM | DIASTOLIC BLOOD PRESSURE: 72 MMHG | SYSTOLIC BLOOD PRESSURE: 116 MMHG | WEIGHT: 150.13 LBS | OXYGEN SATURATION: 98 % | BODY MASS INDEX: 24.13 KG/M2 | HEIGHT: 66 IN

## 2019-08-13 DIAGNOSIS — G89.29 CHRONIC UPPER BACK PAIN: Primary | ICD-10-CM

## 2019-08-13 DIAGNOSIS — M54.9 CHRONIC UPPER BACK PAIN: Primary | ICD-10-CM

## 2019-08-13 DIAGNOSIS — F41.9 ANXIETY: ICD-10-CM

## 2019-08-13 DIAGNOSIS — R07.89 CHEST WALL PAIN: ICD-10-CM

## 2019-08-13 PROCEDURE — 1101F PT FALLS ASSESS-DOCD LE1/YR: CPT | Mod: HCNC,CPTII,S$GLB, | Performed by: INTERNAL MEDICINE

## 2019-08-13 PROCEDURE — 99214 OFFICE O/P EST MOD 30 MIN: CPT | Mod: HCNC,S$GLB,, | Performed by: INTERNAL MEDICINE

## 2019-08-13 PROCEDURE — 99999 PR PBB SHADOW E&M-EST. PATIENT-LVL V: ICD-10-PCS | Mod: PBBFAC,HCNC,, | Performed by: INTERNAL MEDICINE

## 2019-08-13 PROCEDURE — 1101F PR PT FALLS ASSESS DOC 0-1 FALLS W/OUT INJ PAST YR: ICD-10-PCS | Mod: HCNC,CPTII,S$GLB, | Performed by: INTERNAL MEDICINE

## 2019-08-13 PROCEDURE — 99999 PR PBB SHADOW E&M-EST. PATIENT-LVL V: CPT | Mod: PBBFAC,HCNC,, | Performed by: INTERNAL MEDICINE

## 2019-08-13 PROCEDURE — 99214 PR OFFICE/OUTPT VISIT, EST, LEVL IV, 30-39 MIN: ICD-10-PCS | Mod: HCNC,S$GLB,, | Performed by: INTERNAL MEDICINE

## 2019-08-13 RX ORDER — CITALOPRAM 10 MG/1
10 TABLET ORAL DAILY
Qty: 30 TABLET | Refills: 11 | Status: SHIPPED | OUTPATIENT
Start: 2019-08-13 | End: 2020-11-30

## 2019-08-13 NOTE — PROGRESS NOTES
Subjective:       Patient ID: Katiana Hagan is a 73 y.o. female.    Chief Complaint: Chest Pain (3 wks ago off an on ) and Back Pain (Upper left side)    Here for ER follow-up after being seen for chest pain. I reviewed the ER note thoroughly including the testing and it seems it was felt patient was having some musculoskeletal pain possibly coming from the back.  The patient also is concerned that anxiety is playing a role.  She would like to see a specialist for her back and spine but would also like to see if an anti anxiety medication may help.  The patient denies any shortness of breath.  No cough or spitting up blood.  No fever.  We reviewed the studies EKG and x-rays again.    Review of Systems   Constitutional: Negative for appetite change, chills and fever.   HENT: Negative for nosebleeds, sinus pain and sore throat.    Eyes: Negative for visual disturbance.   Respiratory: Negative for cough, shortness of breath and wheezing.    Cardiovascular: Positive for chest pain. Negative for leg swelling.   Gastrointestinal: Negative for abdominal pain, constipation and diarrhea.   Genitourinary: Negative for difficulty urinating and hematuria.   Musculoskeletal: Positive for back pain. Negative for neck pain and neck stiffness.   Skin: Negative for pallor and rash.   Neurological: Negative for headaches.   Psychiatric/Behavioral: Negative for dysphoric mood and suicidal ideas. The patient is nervous/anxious.        Objective:      Physical Exam   Constitutional: She is oriented to person, place, and time. She appears well-developed and well-nourished. No distress.   HENT:   Head: Normocephalic and atraumatic.   Right Ear: External ear normal.   Left Ear: External ear normal.   Mouth/Throat: Oropharynx is clear and moist. No oropharyngeal exudate.   Eyes: Pupils are equal, round, and reactive to light. Conjunctivae are normal. No scleral icterus.   Neck: Normal range of motion. Neck supple. No thyromegaly present.    Cardiovascular: Normal rate and regular rhythm.   No murmur heard.  Pulmonary/Chest: Effort normal and breath sounds normal. She has no wheezes. She exhibits tenderness (Left anterolateral chest wall.  No rash like shingles).   Abdominal: Soft. Bowel sounds are normal. She exhibits no distension. There is no tenderness.   Musculoskeletal: She exhibits no tenderness.   Lymphadenopathy:     She has no cervical adenopathy.   Neurological: She is alert and oriented to person, place, and time.   Skin: No rash noted.   Psychiatric: She has a normal mood and affect.       Assessment:       1. Chronic upper back pain    2. Chest wall pain    3. Anxiety        Plan:       Katiana was seen today for chest pain and back pain.    Diagnoses and all orders for this visit:    Chronic upper back pain  -     Ambulatory referral to Pain Clinic    Chest wall pain    Anxiety    Other orders  -     citalopram (CELEXA) 10 MG tablet; Take 1 tablet (10 mg total) by mouth once daily.          Benefits and side effects of the med discussed.  Follow-up in 2-3 months sooner p.r.n.

## 2019-09-03 ENCOUNTER — PATIENT OUTREACH (OUTPATIENT)
Dept: ADMINISTRATIVE | Facility: OTHER | Age: 74
End: 2019-09-03

## 2019-09-03 ENCOUNTER — TELEPHONE (OUTPATIENT)
Dept: PAIN MEDICINE | Facility: CLINIC | Age: 74
End: 2019-09-03

## 2019-09-03 ENCOUNTER — PES CALL (OUTPATIENT)
Dept: ADMINISTRATIVE | Facility: CLINIC | Age: 74
End: 2019-09-03

## 2019-09-03 DIAGNOSIS — Z12.11 ENCOUNTER FOR FECAL IMMUNOCHEMICAL TEST SCREENING: Primary | ICD-10-CM

## 2019-09-04 ENCOUNTER — OFFICE VISIT (OUTPATIENT)
Dept: PAIN MEDICINE | Facility: CLINIC | Age: 74
End: 2019-09-04
Attending: ANESTHESIOLOGY
Payer: MEDICARE

## 2019-09-04 VITALS
DIASTOLIC BLOOD PRESSURE: 84 MMHG | TEMPERATURE: 98 F | HEIGHT: 66 IN | BODY MASS INDEX: 23.38 KG/M2 | WEIGHT: 145.5 LBS | HEART RATE: 71 BPM | RESPIRATION RATE: 18 BRPM | SYSTOLIC BLOOD PRESSURE: 150 MMHG

## 2019-09-04 DIAGNOSIS — G89.4 CHRONIC PAIN DISORDER: Primary | ICD-10-CM

## 2019-09-04 DIAGNOSIS — M47.812 SPONDYLOSIS OF CERVICAL REGION WITHOUT MYELOPATHY OR RADICULOPATHY: ICD-10-CM

## 2019-09-04 DIAGNOSIS — M79.18 MYOFASCIAL PAIN: ICD-10-CM

## 2019-09-04 DIAGNOSIS — M19.90 ARTHRITIS: ICD-10-CM

## 2019-09-04 PROCEDURE — 99999 PR PBB SHADOW E&M-EST. PATIENT-LVL III: CPT | Mod: PBBFAC,HCNC,, | Performed by: ANESTHESIOLOGY

## 2019-09-04 PROCEDURE — 1101F PT FALLS ASSESS-DOCD LE1/YR: CPT | Mod: HCNC,CPTII,S$GLB, | Performed by: ANESTHESIOLOGY

## 2019-09-04 PROCEDURE — 1101F PR PT FALLS ASSESS DOC 0-1 FALLS W/OUT INJ PAST YR: ICD-10-PCS | Mod: HCNC,CPTII,S$GLB, | Performed by: ANESTHESIOLOGY

## 2019-09-04 PROCEDURE — 20552 PR INJECT TRIGGER POINT, 1 OR 2: ICD-10-PCS | Mod: HCNC,S$GLB,, | Performed by: ANESTHESIOLOGY

## 2019-09-04 PROCEDURE — 20552 NJX 1/MLT TRIGGER POINT 1/2: CPT | Mod: HCNC,S$GLB,, | Performed by: ANESTHESIOLOGY

## 2019-09-04 PROCEDURE — 99204 PR OFFICE/OUTPT VISIT, NEW, LEVL IV, 45-59 MIN: ICD-10-PCS | Mod: 25,HCNC,GC,S$GLB | Performed by: ANESTHESIOLOGY

## 2019-09-04 PROCEDURE — 99204 OFFICE O/P NEW MOD 45 MIN: CPT | Mod: 25,HCNC,GC,S$GLB | Performed by: ANESTHESIOLOGY

## 2019-09-04 PROCEDURE — 99999 PR PBB SHADOW E&M-EST. PATIENT-LVL III: ICD-10-PCS | Mod: PBBFAC,HCNC,, | Performed by: ANESTHESIOLOGY

## 2019-09-04 RX ORDER — MELOXICAM 7.5 MG/1
7.5 TABLET ORAL DAILY PRN
Qty: 30 TABLET | Refills: 5 | Status: SHIPPED | OUTPATIENT
Start: 2019-09-04 | End: 2019-10-10

## 2019-09-04 RX ADMIN — BUPIVACAINE HYDROCHLORIDE 22.5 MG: 5 INJECTION, SOLUTION EPIDURAL; INTRACAUDAL at 07:09

## 2019-09-04 RX ADMIN — BETAMETHASONE SODIUM PHOSPHATE AND BETAMETHASONE ACETATE 3 MG: 3; 3 INJECTION, SUSPENSION INTRA-ARTICULAR; INTRALESIONAL; INTRAMUSCULAR; SOFT TISSUE at 07:09

## 2019-09-04 NOTE — PROGRESS NOTES
Subjective:     Patient ID: Katiana Hagan is a 73 y.o. female.    Chief Complaint: Pain    Consulted by: Dr. Tucker Ren    Disclaimer: This note was generated using voice recognition software.  There may be typographical errors that were missed during proofreading.    HPI:    Katiana Hagan is a 73 y.o. female who presents today with chronic left sided upper back pain. She went to the ED on 8/8 for chest pain/tightness when she had cardiac workup and imaging done which did not reveal therese abnormalities. She was instructed to follow up with her PCP who referred her to our clinic for an evaluation. The pain started about 1 year ago with no inciting event/injury.  Patient describes a sharp,intermittent, 2/10 pain that is localized to the left upper back.  Pain begins after she is driving for one hour or when she is sitting on the couch for >30 mins and she is leaning against something.     This pain is described in detail below.    Aggravating factors: sitting and leaning against something for >30 mins    Mitigating factors: changing positions    Previously seeing: PCP, Dr. Ren    Physical Therapy: has not tried it    Non-pharmacologic Treatment:     · Ice/Heat: has not tried it   · TENS: has not tried it  · Massage: none  · Chiropractic care: none  · Acupuncture: none  · Other: none         Pain Medications:         · Currently taking: nothing    · Has tried in the past:    · Opioids: none  · NSAIDS: prescribed mobic 7.5 mg( has not taken it)  · Tylenol: none  · Muscle relaxants: flexeril 5 mg- tried for a few days  · TCAs: has not tried it   · SNRIs: has not tried it   · Anticonvulsants: has not tried   · topical creams: has not tried it   · Other: has not tried it     Blood thinners: none    Interventional Therapies: none    Relevant Surgeries: none    Affecting sleep? Does not affect her sleep    Affecting daily activities? Does not affect her daily life activities    Depressive symptoms? None            · SI/HI? No    Work status: retired, worked for [employer]    Prescription Monitoring Program database:  Not applicable    No flowsheet data found.    GENERAL:  No weight loss, malaise or fevers.  HEENT:   No recent changes in vision or hearing  NECK:  Negative for lumps, no difficulty with swallowing.  RESPIRATORY:  Negative for cough, wheezing or shortness of breath, patient denies any recent URI.  CARDIOVASCULAR:  Negative for chest pain, leg swelling or palpitations.  GI:  Negative for abdominal discomfort, blood in stools or black stools or change in bowel habits.  MUSCULOSKELETAL:  See HPI.  SKIN:  Negative for lesions, rash, and itching.  PSYCH:  Negative for mood disorder or recent psychosocial stressors.    HEMATOLOGY/LYMPHOLOGY:  Negative for prolonged bleeding, bruising easily or swollen nodes.    ENDO: No history of diabetes or thyroid dysfunction  NEURO:   No history of headaches, syncope, paralysis, seizures or tremors.  All other reviewed and negative other than HPI.          Past Medical History:   Diagnosis Date    Other and unspecified hyperlipidemia 10/29/2013       Past Surgical History:   Procedure Laterality Date    APPENDECTOMY  age 18    Breast augmentation  1988    COSMETIC SURGERY  03/08/2016    facelift    DILATION AND CURETTAGE OF UTERUS      MYOMECTOMY         Review of patient's allergies indicates:   Allergen Reactions    Hydrocortisone Itching    Methylprednisolone aceponate     Prednisone Nausea And Vomiting    Tixocortol pivalate Itching       Current Outpatient Medications   Medication Sig Dispense Refill    calcium-vitamin D3-vitamin K (VIACTIV) 500-500-40 mg-unit-mcg Chew Take 1 tablet by mouth once daily.      cartilage/collagen/bor/hyalur (JOINT HEALTH ORAL) Take by mouth.      Chol/Gl/Ser/RNA/Phen/Prg/Hb150 (SHARPER FOCUS ORAL) Take by mouth.      cholecalciferol, vitamin D3, 2,000 unit Cap Take 1,000 Units by mouth once daily.      citalopram (CELEXA) 10 MG tablet  Take 1 tablet (10 mg total) by mouth once daily. 30 tablet 11    conjugated estrogens (PREMARIN) vaginal cream Place 0.5 g vaginally 3 (three) times a week. Insert into vagina as directed 30 g 4    cyclobenzaprine (FLEXERIL) 5 MG tablet Take 1 tablet (5 mg total) by mouth 3 (three) times daily as needed for Muscle spasms. 15 tablet 0    LACTOBACILLUS ACIDOPHILUS (PROBIOTIC ORAL) Take by mouth.      meloxicam (MOBIC) 7.5 MG tablet Take 1 tablet (7.5 mg total) by mouth daily as needed (back pain). 15 tablet 0    MV-MN/VITC/ASBNA/GLU/KWAME/ (AIRBORNE, ASCORBATE SODIUM, ORAL) Take by mouth.      NON FORMULARY MEDICATION Apply 1 application topically once daily. Manuka Oil      NON FORMULARY MEDICATION Take 1 Bottle by mouth once daily. Kefir      TURMERIC, BULK, MISC Take 1,000 mg by mouth once daily.       No current facility-administered medications for this visit.        Family History   Problem Relation Age of Onset    Breast cancer Maternal Aunt     No Known Problems Father     No Known Problems Mother     Diabetes Sister     Schizophrenia Son     Heart disease Sister     No Known Problems Son     Colon cancer Neg Hx     Hypertension Neg Hx     Ovarian cancer Neg Hx     Stroke Neg Hx        Social History     Socioeconomic History    Marital status:      Spouse name: Not on file    Number of children: Not on file    Years of education: Not on file    Highest education level: Not on file   Occupational History    Not on file   Social Needs    Financial resource strain: Not on file    Food insecurity:     Worry: Not on file     Inability: Not on file    Transportation needs:     Medical: Not on file     Non-medical: Not on file   Tobacco Use    Smoking status: Never Smoker    Smokeless tobacco: Never Used   Substance and Sexual Activity    Alcohol use: Yes     Alcohol/week: 1.2 oz     Types: 2 Glasses of wine per week     Comment: 1-2 drinks weekly - wine or rodrigue    Drug  "use: No    Sexual activity: Not Currently     Partners: Male     Birth control/protection: Post-menopausal     Comment:  2015   Lifestyle    Physical activity:     Days per week: Not on file     Minutes per session: Not on file    Stress: Not on file   Relationships    Social connections:     Talks on phone: Not on file     Gets together: Not on file     Attends Anabaptism service: Not on file     Active member of club or organization: Not on file     Attends meetings of clubs or organizations: Not on file     Relationship status: Not on file   Other Topics Concern    Not on file   Social History Narrative    Not on file       Objective:     Vitals:    09/04/19 1142   BP: (!) 150/84   Pulse: 71   Resp: 18   Temp: 97.8 °F (36.6 °C)   TempSrc: Oral   Weight: 66 kg (145 lb 8.1 oz)   Height: 5' 6" (1.676 m)   PainSc:   2       GEN:  Well developed, well nourished.  No acute distress.  No pain behavior.  HEENT:  No trauma.  Mucous membranes moist.  Nares patent bilaterally.  PSYCH: Normal affect. Thought content appropriate.  CHEST:  Breathing symmetric.  No audible wheezing.  ABD: Soft, non-distended.  SKIN:  Warm, pink, dry.  No rash on exposed areas.    EXT:  No cyanosis, clubbing, or edema.  No color change or changes in nail or hair growth.  5/5 motor strength throughout upper extremities.   Sensory: no sensory deficit in the upper extremities.  NEURO/MUSCULOSKELETAL:  Fully alert, oriented, and appropriate. Speech normal sintia. No cranial nerve deficits.   Reflexes: 1+ and symmetric throughout.  negative Everett's bilaterally.  C-Spine:  full ROM with no elicited pain, no pain with axial/facet loading bilaterally.  Negative Spurling's bilaterally.    No TTP over cervical facet joints, + left thoracic paraspinal muscles    Imaging:        The imaging studies listed below were independently reviewed by me, and I agree with the findings as documented below.     Narrative     EXAMINATION:  MRI CERVICAL " SPINE WITHOUT CONTRAST    CLINICAL HISTORY:  Neck pain, prior xray, abn neuro exam Cervicalgia    TECHNIQUE:  Multiplanar, multisequence MR images of the cervical spine were performed without the administration of contrast.    COMPARISON:  Cervical spine radiograph 04/23/2010    FINDINGS:  Alignment: Normal.    Vertebrae: Normal marrow signal. No fracture.    Discs: Normal height and signal.    Cord: Normal.    Skull base and craniocervical junction: Normal.    Degenerative findings:    C2-C3: No significant spinal canal stenosis or neural foraminal narrowing.    C3-C4: Mild uncovertebral joint spurring.  No significant spinal canal stenosis or neural foraminal narrowing.    C4-C5: Facet arthropathy and uncovertebral joint spurring, resulting in mild left-sided neural foraminal narrowing.  No significant spinal canal stenosis.    C5-C6: Uncovertebral joint spurring and facet arthropathy, resulting in mild bilateral neural foraminal narrowing.  No significant spinal canal stenosis.    C6-C7: Bilateral uncovertebral joint spurring resulting in mild left-sided neural foraminal narrowing.  No significant spinal canal stenosis.    C7-T1: No significant spinal canal stenosis or neural foraminal narrowing.    Paraspinal muscles & soft tissues: Unremarkable.      Impression       Mild degenerative changes of the cervical spine, resulting in multilevel neural foraminal narrowing as detailed above.  No significant spinal canal stenosis.    Electronically signed by resident: Bryan Hale  Date: 03/29/2019  Time: 16:59    Electronically signed by: Wagner Garcia MD  Date: 03/29/2019  Time: 17:09                     Narrative     EXAMINATION:  XR THORACIC SPINE AP LATERAL    CLINICAL HISTORY:  Dorsalgia, unspecified    TECHNIQUE:  AP and lateral views of the thoracic spine were performed.    COMPARISON:  Chest x-ray dated 08/08/2019.    FINDINGS:  The thoracic alignment is within normal limits.  The vertebral body heights are  maintained.  The posterior elements are within normal limits.  The intervertebral disc spaces are unremarkable.  The paraspinal soft tissues are within normal limits.  There is no evidence of acute fracture or listhesis of the thoracic spine.      Impression       No acute process.      Electronically signed by: Sameer Wade MD  Date: 08/08/2019  Time: 15:30         Assessment:     Encounter Diagnoses   Name Primary?    Chronic pain disorder Yes    Myofascial pain     Arthritis     Spondylosis of cervical region without myelopathy or radiculopathy        Plan:     Katiana was seen today for back pain.    Diagnoses and all orders for this visit:    Chronic pain disorder  -     meloxicam (MOBIC) 7.5 MG tablet; Take 1 tablet (7.5 mg total) by mouth daily as needed for Pain.    Myofascial pain  -     meloxicam (MOBIC) 7.5 MG tablet; Take 1 tablet (7.5 mg total) by mouth daily as needed for Pain.  -     bupivacaine (PF) 0.5% (5 mg/mL) injection 22.5 mg  -     betamethasone acetate-betamethasone sodium phosphate injection 3 mg    Arthritis  -     meloxicam (MOBIC) 7.5 MG tablet; Take 1 tablet (7.5 mg total) by mouth daily as needed for Pain.    Spondylosis of cervical region without myelopathy or radiculopathy         Upper back pain is consistent with the above.    We discussed the assessment and recommendations.  All available images were reviewed. We discussed the disease process, prognosis, treatment plan, and risks and benefits. The patient is aware of the risks and benefits of the medications being prescribed, common side effects, and proper usage. The following is the plan we agreed on:     1. Patient will benefit from trigger point injection into the left paraspinal muscles The procedure was explained in detail, including risks, benefits, and alternatives.  All questions answered.  Consent obtained today. TPI was performed at today's visit.  2. Prescribe mobic 7.5 mg daily as needed for the pain. Also  recommend to take turmeric 500-1000 mg if patient does not note any improvement from taking the mobic.   3. Recommend lidocaine patches to be applied prn to the back as patient reports increased itching in the left thoracic region.   4. RTC in 1 month for a follow up     Genevieve Genao MD  [facility] Resident    Thank you for allowing me to participate in the care of this patient.   Please do not hesitate to call me at [phone] with any questions or concerns.    Trigger Point Injection:   The procedure was discussed with the patient including complications of damage, bleeding, infection, and failure of pain relief.     All medications, allergies, and relevant histories were reviewed. No recent antibiotics or infections.  A time-out was taken to verify the correct patient, procedure, laterality, and appropriate medications/allergies.    Trigger points were identified by palpation and marked. CHG prep of sites done. A 27-gauge needle was advanced to the point of maximal tenderness, and 1 mL of a mixture of 0.5% bupivacaine with betamethasone 3 mg was injected after negative aspiration in a fanlike distribution. All sites done in the same manner. Patient tolerated the procedure well and without complications. Sites injected included: left upper trap and splenius cervicis     The patient tolerated the procedure well and was discharged in excellent condition.      Malathi Damian MD  09/04/2019     The above plan and management options were discussed at length with patient. Patient is in agreement with the above and verbalized understanding. It will be communicated with the referring physician via electronic record, fax, or mail.

## 2019-09-04 NOTE — PATIENT INSTRUCTIONS
Recommend continuing meloxicam 7.5 mg daily as needed.  Stay at most comfortable dose.  Take medication with meals.  If you experience any upset stomach, nausea, or vomiting, stop taking this medication. Do not take this with any other medications in the NSAID family, such as ibuprofen, aspirin, and naproxen.     If this is not helpful, recommend trying turmeric 500-1000 mg to see if this can be more helpful than the meloxicam.  Recommend getting a formulation that is 95% curcuminoids.  Stay at most comfortable dose.  Take medication with meals.  If you experience any upset stomach, nausea, or vomiting, stop taking this medication.    Do not take the two of these together, but you can see which is more helpful for you.    Recommend obtaining an over the counter lidocaine patch, such as the one from [employer]me, Amy, Blue Emu, or [employer].  I, personally, like the one from [employer] as the adhesive is not irritating.  You may wear this for 12 hours, then remove the patch.  You may wear up to 2 of these a day.

## 2019-09-04 NOTE — LETTER
September 6, 2019      Tucker Ren MD  [address]  [facility] [address]           [facility] [place] [address] [facility] [address]  [address]  [facility] [address]  Phone: [phone]  Fax: [phone]          Patient: Katiana Hagan   MR Number: [id number]   YOB: 1945   Date of Visit: 9/4/2019       Dear Dr. Tucker Ren:    Thank you for referring Katiana Hagan to me for evaluation. Attached you will find relevant portions of my assessment and plan of care.    If you have questions, please do not hesitate to call me. I look forward to following Katiana Hagan along with you.    Sincerely,    Malathi Damian MD    Enclosure  CC:  No Recipients    If you would like to receive this communication electronically, please contact [email or web address] or [phone] to request more information on [facility] access.    For providers and/or their staff who would like to refer a patient to [facility], please contact us through our one-stop-shop provider referral line, [facility], at [phone].    If you feel you have received this communication in error or would no longer like to receive these types of communications, please e-mail [email or web address]

## 2019-09-06 RX ORDER — BETAMETHASONE SODIUM PHOSPHATE AND BETAMETHASONE ACETATE 3; 3 MG/ML; MG/ML
3 INJECTION, SUSPENSION INTRA-ARTICULAR; INTRALESIONAL; INTRAMUSCULAR; SOFT TISSUE
Status: COMPLETED | OUTPATIENT
Start: 2019-09-04 | End: 2019-09-04

## 2019-09-06 RX ORDER — BUPIVACAINE HYDROCHLORIDE 5 MG/ML
4.5 INJECTION, SOLUTION EPIDURAL; INTRACAUDAL
Status: COMPLETED | OUTPATIENT
Start: 2019-09-04 | End: 2019-09-04

## 2019-09-17 ENCOUNTER — PES CALL (OUTPATIENT)
Dept: ADMINISTRATIVE | Facility: CLINIC | Age: 74
End: 2019-09-17

## 2019-10-07 ENCOUNTER — PATIENT OUTREACH (OUTPATIENT)
Dept: ADMINISTRATIVE | Facility: OTHER | Age: 74
End: 2019-10-07

## 2019-10-10 ENCOUNTER — OFFICE VISIT (OUTPATIENT)
Dept: PAIN MEDICINE | Facility: CLINIC | Age: 74
End: 2019-10-10
Attending: ANESTHESIOLOGY
Payer: MEDICARE

## 2019-10-10 VITALS
BODY MASS INDEX: 23.53 KG/M2 | OXYGEN SATURATION: 100 % | HEART RATE: 90 BPM | DIASTOLIC BLOOD PRESSURE: 61 MMHG | SYSTOLIC BLOOD PRESSURE: 124 MMHG | HEIGHT: 66 IN | RESPIRATION RATE: 18 BRPM | WEIGHT: 146.38 LBS | TEMPERATURE: 98 F

## 2019-10-10 DIAGNOSIS — M79.18 MYOFASCIAL PAIN: ICD-10-CM

## 2019-10-10 DIAGNOSIS — M47.812 SPONDYLOSIS OF CERVICAL REGION WITHOUT MYELOPATHY OR RADICULOPATHY: ICD-10-CM

## 2019-10-10 DIAGNOSIS — G89.4 CHRONIC PAIN DISORDER: Primary | ICD-10-CM

## 2019-10-10 DIAGNOSIS — M19.90 ARTHRITIS: ICD-10-CM

## 2019-10-10 PROCEDURE — 1101F PR PT FALLS ASSESS DOC 0-1 FALLS W/OUT INJ PAST YR: ICD-10-PCS | Mod: HCNC,CPTII,S$GLB, | Performed by: ANESTHESIOLOGY

## 2019-10-10 PROCEDURE — 1101F PT FALLS ASSESS-DOCD LE1/YR: CPT | Mod: HCNC,CPTII,S$GLB, | Performed by: ANESTHESIOLOGY

## 2019-10-10 PROCEDURE — 99213 PR OFFICE/OUTPT VISIT, EST, LEVL III, 20-29 MIN: ICD-10-PCS | Mod: HCNC,S$GLB,, | Performed by: ANESTHESIOLOGY

## 2019-10-10 PROCEDURE — 99999 PR PBB SHADOW E&M-EST. PATIENT-LVL III: CPT | Mod: PBBFAC,HCNC,, | Performed by: ANESTHESIOLOGY

## 2019-10-10 PROCEDURE — 99213 OFFICE O/P EST LOW 20 MIN: CPT | Mod: HCNC,S$GLB,, | Performed by: ANESTHESIOLOGY

## 2019-10-10 PROCEDURE — 99999 PR PBB SHADOW E&M-EST. PATIENT-LVL III: ICD-10-PCS | Mod: PBBFAC,HCNC,, | Performed by: ANESTHESIOLOGY

## 2019-10-10 NOTE — PROGRESS NOTES
Subjective:     Patient ID: Katiana Hagan is a 73 y.o. female.    Chief Complaint: Pain    Consulted by: Dr. Tucker Ren    Disclaimer: This note was generated using voice recognition software.  There may be typographical errors that were missed during proofreading.    HPI:    Katiana Hagan is a 73 y.o. female who presents today with chronic left sided upper back pain. She went to the ED on 8/8 for chest pain/tightness when she had cardiac workup and imaging done which did not reveal therese abnormalities. She was instructed to follow up with her PCP who referred her to our clinic for an evaluation. The pain started about 1 year ago with no inciting event/injury.  Patient describes a sharp,intermittent, 2/10 pain that is localized to the left upper back.  Pain begins after she is driving for one hour or when she is sitting on the couch for >30 mins and she is leaning against something.     This pain is described in detail below.    Aggravating factors: sitting and leaning against something for >30 mins    Mitigating factors: changing positions    Previously seeing: PCP, Dr. Ren    Interval History (10/10/2019):  She returns today for follow up.  She reports that the trigger point injection and meloxicam have been helpful for the pain.  She is satisfied with her current pain control.    Physical Therapy: has not tried it    Non-pharmacologic Treatment:     · Ice/Heat: has not tried it   · TENS: has not tried it  · Massage: none  · Chiropractic care: none  · Acupuncture: none  · Other: none         Pain Medications:         · Currently taking: nothing    · Has tried in the past:    · Opioids: none  · NSAIDS: prescribed mobic 7.5 mg( has not taken it)  · Tylenol: none  · Muscle relaxants: flexeril 5 mg- tried for a few days  · TCAs: has not tried it   · SNRIs: has not tried it   · Anticonvulsants: has not tried   · topical creams: has not tried it   · Other: has not tried it     Blood thinners:  none    Interventional Therapies: none    Relevant Surgeries: none    Affecting sleep? Does not affect her sleep    Affecting daily activities? Does not affect her daily life activities    Depressive symptoms? None           · SI/HI? No    Work status: retired, worked for [employer]    Prescription Monitoring Program database:  Not applicable    Last 3 PDI Scores 10/10/2019   Pain Disability Index (PDI) 21       GENERAL:  No weight loss, malaise or fevers.  HEENT:   No recent changes in vision or hearing  NECK:  Negative for lumps, no difficulty with swallowing.  RESPIRATORY:  Negative for cough, wheezing or shortness of breath, patient denies any recent URI.  CARDIOVASCULAR:  Negative for chest pain, leg swelling or palpitations.  GI:  Negative for abdominal discomfort, blood in stools or black stools or change in bowel habits.  MUSCULOSKELETAL:  See HPI.  SKIN:  Negative for lesions, rash, and itching.  PSYCH:  Negative for mood disorder or recent psychosocial stressors.    HEMATOLOGY/LYMPHOLOGY:  Negative for prolonged bleeding, bruising easily or swollen nodes.    ENDO: No history of diabetes or thyroid dysfunction  NEURO:   No history of headaches, syncope, paralysis, seizures or tremors.  All other reviewed and negative other than HPI.          Past Medical History:   Diagnosis Date    Other and unspecified hyperlipidemia 10/29/2013       Past Surgical History:   Procedure Laterality Date    APPENDECTOMY  age 18    Breast augmentation  1988    COSMETIC SURGERY  03/08/2016    facelift    DILATION AND CURETTAGE OF UTERUS      MYOMECTOMY         Review of patient's allergies indicates:   Allergen Reactions    Hydrocortisone Itching    Methylprednisolone aceponate     Prednisone Nausea And Vomiting    Tixocortol pivalate Itching       Current Outpatient Medications   Medication Sig Dispense Refill    calcium-vitamin D3-vitamin K (VIACTIV) 500-500-40 mg-unit-mcg Chew Take 1 tablet by mouth once daily.       cartilage/collagen/bor/hyalur (JOINT HEALTH ORAL) Take by mouth.      Chol/Gl/Ser/RNA/Phen/Prg/Hb150 (SHARPER FOCUS ORAL) Take by mouth.      cholecalciferol, vitamin D3, 2,000 unit Cap Take 1,000 Units by mouth once daily.      cyclobenzaprine (FLEXERIL) 5 MG tablet Take 1 tablet (5 mg total) by mouth 3 (three) times daily as needed for Muscle spasms. 15 tablet 0    LACTOBACILLUS ACIDOPHILUS (PROBIOTIC ORAL) Take by mouth.      meloxicam (MOBIC) 7.5 MG tablet Take 1 tablet (7.5 mg total) by mouth daily as needed for Pain. 30 tablet 5    MV-MN/VITC/ASBNA/GLU/KWAME/ (AIRBORNE, ASCORBATE SODIUM, ORAL) Take by mouth.      NON FORMULARY MEDICATION Apply 1 application topically once daily. Manuka Oil      NON FORMULARY MEDICATION Take 1 Bottle by mouth once daily. Kefir      TURMERIC, BULK, MISC Take 1,000 mg by mouth once daily.      citalopram (CELEXA) 10 MG tablet Take 1 tablet (10 mg total) by mouth once daily. 30 tablet 11    conjugated estrogens (PREMARIN) vaginal cream Place 0.5 g vaginally 3 (three) times a week. Insert into vagina as directed 30 g 4     No current facility-administered medications for this visit.        Family History   Problem Relation Age of Onset    Breast cancer Maternal Aunt     No Known Problems Father     No Known Problems Mother     Diabetes Sister     Schizophrenia Son     Heart disease Sister     No Known Problems Son     Colon cancer Neg Hx     Hypertension Neg Hx     Ovarian cancer Neg Hx     Stroke Neg Hx        Social History     Socioeconomic History    Marital status:      Spouse name: Not on file    Number of children: Not on file    Years of education: Not on file    Highest education level: Not on file   Occupational History    Not on file   Social Needs    Financial resource strain: Not on file    Food insecurity:     Worry: Not on file     Inability: Not on file    Transportation needs:     Medical: Not on file     Non-medical: Not on  "file   Tobacco Use    Smoking status: Never Smoker    Smokeless tobacco: Never Used   Substance and Sexual Activity    Alcohol use: Yes     Alcohol/week: 2.0 standard drinks     Types: 2 Glasses of wine per week     Comment: 1-2 drinks weekly - wine or rodrigue    Drug use: No    Sexual activity: Not Currently     Partners: Male     Birth control/protection: Post-menopausal     Comment:  2015   Lifestyle    Physical activity:     Days per week: Not on file     Minutes per session: Not on file    Stress: Not on file   Relationships    Social connections:     Talks on phone: Not on file     Gets together: Not on file     Attends Anabaptism service: Not on file     Active member of club or organization: Not on file     Attends meetings of clubs or organizations: Not on file     Relationship status: Not on file   Other Topics Concern    Not on file   Social History Narrative    Not on file       Objective:     Vitals:    10/10/19 1114   Resp: 18   SpO2: 100%   Height: 5' 6" (1.676 m)       GEN:  Well developed, well nourished.  No acute distress.  No pain behavior.  HEENT:  No trauma.  Mucous membranes moist.  Nares patent bilaterally.  PSYCH: Normal affect. Thought content appropriate.  CHEST:  Breathing symmetric.  No audible wheezing.  ABD: Soft, non-distended.  SKIN:  Warm, pink, dry.  No rash on exposed areas.    EXT:  No cyanosis, clubbing, or edema.  No color change or changes in nail or hair growth.  5/5 motor strength throughout upper extremities.   Sensory: no sensory deficit in the upper extremities.  NEURO/MUSCULOSKELETAL:  Fully alert, oriented, and appropriate. Speech normal sintia. No cranial nerve deficits.   Reflexes: 1+ and symmetric throughout.  negative Everett's bilaterally.  C-Spine:  full ROM with no elicited pain, no pain with axial/facet loading bilaterally.  Negative Spurling's bilaterally.    No TTP over cervical facet joints, + left thoracic paraspinal muscles    Imaging:    "     The imaging studies listed below were independently reviewed by me, and I agree with the findings as documented below.     Narrative     EXAMINATION:  MRI CERVICAL SPINE WITHOUT CONTRAST    CLINICAL HISTORY:  Neck pain, prior xray, abn neuro exam Cervicalgia    TECHNIQUE:  Multiplanar, multisequence MR images of the cervical spine were performed without the administration of contrast.    COMPARISON:  Cervical spine radiograph 04/23/2010    FINDINGS:  Alignment: Normal.    Vertebrae: Normal marrow signal. No fracture.    Discs: Normal height and signal.    Cord: Normal.    Skull base and craniocervical junction: Normal.    Degenerative findings:    C2-C3: No significant spinal canal stenosis or neural foraminal narrowing.    C3-C4: Mild uncovertebral joint spurring.  No significant spinal canal stenosis or neural foraminal narrowing.    C4-C5: Facet arthropathy and uncovertebral joint spurring, resulting in mild left-sided neural foraminal narrowing.  No significant spinal canal stenosis.    C5-C6: Uncovertebral joint spurring and facet arthropathy, resulting in mild bilateral neural foraminal narrowing.  No significant spinal canal stenosis.    C6-C7: Bilateral uncovertebral joint spurring resulting in mild left-sided neural foraminal narrowing.  No significant spinal canal stenosis.    C7-T1: No significant spinal canal stenosis or neural foraminal narrowing.    Paraspinal muscles & soft tissues: Unremarkable.      Impression       Mild degenerative changes of the cervical spine, resulting in multilevel neural foraminal narrowing as detailed above.  No significant spinal canal stenosis.    Electronically signed by resident: Bryan Hale  Date: 03/29/2019  Time: 16:59    Electronically signed by: Wagner Garcia MD  Date: 03/29/2019  Time: 17:09                     Narrative     EXAMINATION:  XR THORACIC SPINE AP LATERAL    CLINICAL HISTORY:  Dorsalgia, unspecified    TECHNIQUE:  AP and lateral views of the  thoracic spine were performed.    COMPARISON:  Chest x-ray dated 08/08/2019.    FINDINGS:  The thoracic alignment is within normal limits.  The vertebral body heights are maintained.  The posterior elements are within normal limits.  The intervertebral disc spaces are unremarkable.  The paraspinal soft tissues are within normal limits.  There is no evidence of acute fracture or listhesis of the thoracic spine.      Impression       No acute process.      Electronically signed by: Sameer Wade MD  Date: 08/08/2019  Time: 15:30         Assessment:     Encounter Diagnoses   Name Primary?    Chronic pain disorder Yes    Myofascial pain     Arthritis     Spondylosis of cervical region without myelopathy or radiculopathy        Plan:     Diagnoses and all orders for this visit:    Chronic pain disorder    Myofascial pain    Arthritis    Spondylosis of cervical region without myelopathy or radiculopathy         Upper back pain is consistent with the above.    We discussed the assessment and recommendations.  All available images were reviewed. We discussed the disease process, prognosis, treatment plan, and risks and benefits. The patient is aware of the risks and benefits of the medications being prescribed, common side effects, and proper usage. The following is the plan we agreed on:     1. Can repeat TPIs PRN.  2. Continue turmeric 500-1000 mg daily PRN   3. Continue lidocaine patches PRN  4. RTC as needed       Malathi Damian MD  10/10/2019     The above plan and management options were discussed at length with patient. Patient is in agreement with the above and verbalized understanding. It will be communicated with the referring physician via electronic record, fax, or mail.

## 2019-10-11 ENCOUNTER — PES CALL (OUTPATIENT)
Dept: ADMINISTRATIVE | Facility: CLINIC | Age: 74
End: 2019-10-11

## 2019-10-13 ENCOUNTER — PATIENT OUTREACH (OUTPATIENT)
Dept: ADMINISTRATIVE | Facility: OTHER | Age: 74
End: 2019-10-13

## 2019-10-15 ENCOUNTER — OFFICE VISIT (OUTPATIENT)
Dept: OBSTETRICS AND GYNECOLOGY | Facility: CLINIC | Age: 74
End: 2019-10-15
Attending: OBSTETRICS & GYNECOLOGY
Payer: MEDICARE

## 2019-10-15 VITALS
BODY MASS INDEX: 23.77 KG/M2 | WEIGHT: 147.94 LBS | DIASTOLIC BLOOD PRESSURE: 80 MMHG | HEIGHT: 66 IN | SYSTOLIC BLOOD PRESSURE: 132 MMHG

## 2019-10-15 DIAGNOSIS — Z12.31 SCREENING MAMMOGRAM, ENCOUNTER FOR: ICD-10-CM

## 2019-10-15 DIAGNOSIS — R35.0 URINARY FREQUENCY: ICD-10-CM

## 2019-10-15 DIAGNOSIS — Z01.419 ENCOUNTER FOR GYNECOLOGICAL EXAMINATION WITHOUT ABNORMAL FINDING: Primary | ICD-10-CM

## 2019-10-15 DIAGNOSIS — N95.2 POSTMENOPAUSAL ATROPHIC VAGINITIS: ICD-10-CM

## 2019-10-15 PROCEDURE — G0101 PR CA SCREEN;PELVIC/BREAST EXAM: ICD-10-PCS | Mod: HCNC,S$GLB,, | Performed by: OBSTETRICS & GYNECOLOGY

## 2019-10-15 PROCEDURE — 99999 PR PBB SHADOW E&M-EST. PATIENT-LVL III: CPT | Mod: PBBFAC,HCNC,, | Performed by: OBSTETRICS & GYNECOLOGY

## 2019-10-15 PROCEDURE — G0101 CA SCREEN;PELVIC/BREAST EXAM: HCPCS | Mod: HCNC,S$GLB,, | Performed by: OBSTETRICS & GYNECOLOGY

## 2019-10-15 PROCEDURE — 99999 PR PBB SHADOW E&M-EST. PATIENT-LVL III: ICD-10-PCS | Mod: PBBFAC,HCNC,, | Performed by: OBSTETRICS & GYNECOLOGY

## 2019-10-15 RX ORDER — CETIRIZINE HYDROCHLORIDE 10 MG/1
TABLET ORAL DAILY PRN
Refills: 1 | COMMUNITY
Start: 2019-09-16 | End: 2023-01-12

## 2019-10-15 RX ORDER — OXYBUTYNIN CHLORIDE 10 MG/1
10 TABLET, EXTENDED RELEASE ORAL DAILY
Qty: 30 TABLET | Refills: 11 | Status: SHIPPED | OUTPATIENT
Start: 2019-10-15 | End: 2020-11-30

## 2019-10-15 NOTE — PROGRESS NOTES
Subjective:       Patient ID: Katiana Hagan is a 73 y.o. female.    Chief Complaint:  Gynecologic Exam      History of Present Illness  HPI    Katiana Hagan is a 73 y.o. female  here for her annual GYN exam.    She describes her periods as stopped age 52   denies break through bleeding.   denies vaginal itching or irritation.  Denies vaginal discharge.  She is not sexually active.      History of abnormal pap: No  Last Pap: approximate date  and was normal  Last MMG: normal--routine follow-up in 12 months  Last Colonoscopy: NONE  denies domestic violence. She does feel safe at home.     Past Medical History:   Diagnosis Date    Other and unspecified hyperlipidemia 10/29/2013     Past Surgical History:   Procedure Laterality Date    APPENDECTOMY  age 18    Breast augmentation      COSMETIC SURGERY  2016    facelift    DILATION AND CURETTAGE OF UTERUS      MYOMECTOMY       Social History     Socioeconomic History    Marital status:      Spouse name: Not on file    Number of children: Not on file    Years of education: Not on file    Highest education level: Not on file   Occupational History    Not on file   Social Needs    Financial resource strain: Not on file    Food insecurity:     Worry: Not on file     Inability: Not on file    Transportation needs:     Medical: Not on file     Non-medical: Not on file   Tobacco Use    Smoking status: Never Smoker    Smokeless tobacco: Never Used   Substance and Sexual Activity    Alcohol use: Yes     Alcohol/week: 2.0 standard drinks     Types: 2 Glasses of wine per week     Comment: 1-2 drinks weekly - wine or rodrigue    Drug use: No    Sexual activity: Not Currently     Partners: Male     Birth control/protection: Post-menopausal     Comment:  2015   Lifestyle    Physical activity:     Days per week: Not on file     Minutes per session: Not on file    Stress: Not on file   Relationships    Social connections:  "    Talks on phone: Not on file     Gets together: Not on file     Attends Yarsanism service: Not on file     Active member of club or organization: Not on file     Attends meetings of clubs or organizations: Not on file     Relationship status: Not on file   Other Topics Concern    Not on file   Social History Narrative    Not on file     Family History   Problem Relation Age of Onset    Breast cancer Maternal Aunt     No Known Problems Father     No Known Problems Mother     Diabetes Sister     Schizophrenia Son     Heart disease Sister     No Known Problems Son     Colon cancer Neg Hx     Hypertension Neg Hx     Ovarian cancer Neg Hx     Stroke Neg Hx      OB History        2    Para   2    Term   2            AB        Living   2       SAB        TAB        Ectopic        Multiple        Live Births   2                 /80   Ht 5' 6" (1.676 m)   Wt 67.1 kg (147 lb 14.9 oz)   LMP 1998 (Approximate)   BMI 23.88 kg/m²         GYN & OB History  Patient's last menstrual period was 1998 (approximate).   Date of Last Pap: 2016    OB History    Para Term  AB Living   2 2 2     2   SAB TAB Ectopic Multiple Live Births           2      # Outcome Date GA Lbr Curt/2nd Weight Sex Delivery Anes PTL Lv   2 Term      Vag-Spont  N BARBY   1 Term      Vag-Spont  N BARBY       Review of Systems  Review of Systems   Constitutional: Negative for activity change, appetite change, fatigue and unexpected weight change.   HENT: Negative.    Eyes: Negative for visual disturbance.   Respiratory: Negative for shortness of breath and wheezing.    Cardiovascular: Negative for chest pain, palpitations and leg swelling.   Gastrointestinal: Negative for abdominal pain, bloating and blood in stool.   Endocrine: Negative for diabetes, hair loss and hot flashes.   Genitourinary: Positive for bladder incontinence, frequency, urgency and vaginal dryness. Negative for decreased libido, " dyspareunia, hot flashes and postmenopausal bleeding.   Musculoskeletal: Negative for back pain and joint swelling.   Integumentary:  Negative for acne, hair changes and nipple discharge.   Neurological: Negative for headaches.   Hematological: Does not bruise/bleed easily.   Psychiatric/Behavioral: Negative for depression and sleep disturbance. The patient is not nervous/anxious.    Breast: Negative for mastodynia and nipple discharge          Objective:      Physical Exam:   Constitutional: She is oriented to person, place, and time. She appears well-developed and well-nourished.    HENT:   Head: Normocephalic and atraumatic.    Eyes: Pupils are equal, round, and reactive to light. EOM are normal.    Neck: Normal range of motion. Neck supple.    Cardiovascular: Normal rate and regular rhythm.     Pulmonary/Chest: Effort normal and breath sounds normal.   BREASTS:  no mass, no tenderness, no deformity and no retraction. Right breast exhibits no inverted nipple, no mass, no nipple discharge, no skin change, no tenderness, no bleeding and no swelling. Left breast exhibits no inverted nipple, no mass, no nipple discharge, no skin change, no tenderness, no bleeding and no swelling. Breasts are symmetrical.      Implants bilaterally        Abdominal: Soft. Bowel sounds are normal.     Genitourinary: Pelvic exam was performed with patient supine.   Genitourinary Comments: PELVIC: Normal external genitalia without lesions.  Normal hair distribution.  Adequate perineal body, normal urethral meatus.  Vagina  Dry and poorly rugated, atrophic, without lesions or discharge.  Cervix pink, without lesions, discharge or tenderness.  No significant cystocele or rectocele.  Bimanual exam shows uterus to be normal size, regular, mobile and nontender.  Adnexa without masses or tenderness.    RECTAL:Deferred             Musculoskeletal: Normal range of motion and moves all extremeties.       Neurological: She is alert and oriented to  person, place, and time.    Skin: Skin is warm and dry.    Psychiatric: She has a normal mood and affect.              Assessment:        1. Encounter for gynecological examination without abnormal finding    2. Screening mammogram, encounter for    3. Postmenopausal atrophic vaginitis    4. Urinary frequency                Plan:        1. Encounter for gynecological examination without abnormal finding  COUNSELING:  The patient was counseled today on regular weight bearing exercise. Patient was counseled today on the new ACS guidelines for cervical cytology screening as well as the current recommendations for breast cancer screening. Counseling session lasted approximately 10 minutes, and all her questions were answered. She was advised to see her primary care physician for all other health maintenance.   FOLLOW-UP with me for next routine visit.         2. Screening mammogram, encounter for      - Mammo Digital Screening Bilat w/ Alan; Future    3. Postmenopausal atrophic vaginitis      - conjugated estrogens (PREMARIN) vaginal cream; Place 0.5 g vaginally 3 (three) times a week. Insert into vagina as directed  Dispense: 30 g; Refill: 4       Follow up in about 2 years (around 10/15/2021).

## 2019-10-16 ENCOUNTER — TELEPHONE (OUTPATIENT)
Dept: INTERNAL MEDICINE | Facility: CLINIC | Age: 74
End: 2019-10-16

## 2019-10-16 ENCOUNTER — OFFICE VISIT (OUTPATIENT)
Dept: INTERNAL MEDICINE | Facility: CLINIC | Age: 74
End: 2019-10-16
Payer: MEDICARE

## 2019-10-16 ENCOUNTER — IMMUNIZATION (OUTPATIENT)
Dept: PHARMACY | Facility: CLINIC | Age: 74
End: 2019-10-16
Payer: MEDICARE

## 2019-10-16 ENCOUNTER — LAB VISIT (OUTPATIENT)
Dept: LAB | Facility: HOSPITAL | Age: 74
End: 2019-10-16
Attending: INTERNAL MEDICINE
Payer: MEDICARE

## 2019-10-16 VITALS
SYSTOLIC BLOOD PRESSURE: 132 MMHG | WEIGHT: 146.81 LBS | OXYGEN SATURATION: 98 % | HEART RATE: 78 BPM | BODY MASS INDEX: 24.43 KG/M2 | DIASTOLIC BLOOD PRESSURE: 68 MMHG

## 2019-10-16 VITALS
HEIGHT: 65 IN | WEIGHT: 146.81 LBS | DIASTOLIC BLOOD PRESSURE: 84 MMHG | BODY MASS INDEX: 24.46 KG/M2 | SYSTOLIC BLOOD PRESSURE: 130 MMHG | HEART RATE: 68 BPM

## 2019-10-16 DIAGNOSIS — N39.41 URGE INCONTINENCE: ICD-10-CM

## 2019-10-16 DIAGNOSIS — Z00.00 ROUTINE PHYSICAL EXAMINATION: Primary | ICD-10-CM

## 2019-10-16 DIAGNOSIS — E78.5 HYPERLIPIDEMIA, UNSPECIFIED HYPERLIPIDEMIA TYPE: ICD-10-CM

## 2019-10-16 DIAGNOSIS — E55.9 VITAMIN D DEFICIENCY: ICD-10-CM

## 2019-10-16 DIAGNOSIS — Z00.00 ROUTINE PHYSICAL EXAMINATION: ICD-10-CM

## 2019-10-16 DIAGNOSIS — F41.9 ANXIETY: ICD-10-CM

## 2019-10-16 DIAGNOSIS — I87.2 VENOUS INSUFFICIENCY OF BOTH LOWER EXTREMITIES: ICD-10-CM

## 2019-10-16 DIAGNOSIS — H91.93 DECREASED HEARING OF BOTH EARS: ICD-10-CM

## 2019-10-16 DIAGNOSIS — Z00.00 ENCOUNTER FOR PREVENTIVE HEALTH EXAMINATION: Primary | ICD-10-CM

## 2019-10-16 LAB
25(OH)D3+25(OH)D2 SERPL-MCNC: 55 NG/ML (ref 30–96)
ALBUMIN SERPL BCP-MCNC: 4.4 G/DL (ref 3.5–5.2)
ALP SERPL-CCNC: 73 U/L (ref 55–135)
ALT SERPL W/O P-5'-P-CCNC: 18 U/L (ref 10–44)
ANION GAP SERPL CALC-SCNC: 7 MMOL/L (ref 8–16)
AST SERPL-CCNC: 16 U/L (ref 10–40)
BASOPHILS # BLD AUTO: 0.05 K/UL (ref 0–0.2)
BASOPHILS NFR BLD: 0.9 % (ref 0–1.9)
BILIRUB SERPL-MCNC: 0.5 MG/DL (ref 0.1–1)
BUN SERPL-MCNC: 16 MG/DL (ref 8–23)
CALCIUM SERPL-MCNC: 10.4 MG/DL (ref 8.7–10.5)
CHLORIDE SERPL-SCNC: 105 MMOL/L (ref 95–110)
CHOLEST SERPL-MCNC: 213 MG/DL (ref 120–199)
CHOLEST/HDLC SERPL: 3.2 {RATIO} (ref 2–5)
CO2 SERPL-SCNC: 29 MMOL/L (ref 23–29)
CREAT SERPL-MCNC: 0.8 MG/DL (ref 0.5–1.4)
DIFFERENTIAL METHOD: NORMAL
EOSINOPHIL # BLD AUTO: 0.1 K/UL (ref 0–0.5)
EOSINOPHIL NFR BLD: 2.2 % (ref 0–8)
ERYTHROCYTE [DISTWIDTH] IN BLOOD BY AUTOMATED COUNT: 13.9 % (ref 11.5–14.5)
EST. GFR  (AFRICAN AMERICAN): >60 ML/MIN/1.73 M^2
EST. GFR  (NON AFRICAN AMERICAN): >60 ML/MIN/1.73 M^2
GLUCOSE SERPL-MCNC: 100 MG/DL (ref 70–110)
HCT VFR BLD AUTO: 46.7 % (ref 37–48.5)
HDLC SERPL-MCNC: 67 MG/DL (ref 40–75)
HDLC SERPL: 31.5 % (ref 20–50)
HGB BLD-MCNC: 15.6 G/DL (ref 12–16)
LDLC SERPL CALC-MCNC: 125.4 MG/DL (ref 63–159)
LYMPHOCYTES # BLD AUTO: 1.7 K/UL (ref 1–4.8)
LYMPHOCYTES NFR BLD: 32 % (ref 18–48)
MCH RBC QN AUTO: 29.1 PG (ref 27–31)
MCHC RBC AUTO-ENTMCNC: 33.4 G/DL (ref 32–36)
MCV RBC AUTO: 87 FL (ref 82–98)
MONOCYTES # BLD AUTO: 0.7 K/UL (ref 0.3–1)
MONOCYTES NFR BLD: 12.8 % (ref 4–15)
NEUTROPHILS # BLD AUTO: 2.8 K/UL (ref 1.8–7.7)
NEUTROPHILS NFR BLD: 52.1 % (ref 38–73)
NONHDLC SERPL-MCNC: 146 MG/DL
PLATELET # BLD AUTO: 221 K/UL (ref 150–350)
PMV BLD AUTO: 10.7 FL (ref 9.2–12.9)
POTASSIUM SERPL-SCNC: 4.4 MMOL/L (ref 3.5–5.1)
PROT SERPL-MCNC: 7.3 G/DL (ref 6–8.4)
RBC # BLD AUTO: 5.37 M/UL (ref 4–5.4)
SODIUM SERPL-SCNC: 141 MMOL/L (ref 136–145)
TRIGL SERPL-MCNC: 103 MG/DL (ref 30–150)
TSH SERPL DL<=0.005 MIU/L-ACNC: 0.99 UIU/ML (ref 0.4–4)
WBC # BLD AUTO: 5.37 K/UL (ref 3.9–12.7)

## 2019-10-16 PROCEDURE — G0439 PPPS, SUBSEQ VISIT: HCPCS | Mod: HCNC,S$GLB,, | Performed by: NURSE PRACTITIONER

## 2019-10-16 PROCEDURE — 36415 COLL VENOUS BLD VENIPUNCTURE: CPT | Mod: HCNC

## 2019-10-16 PROCEDURE — 80053 COMPREHEN METABOLIC PANEL: CPT | Mod: HCNC

## 2019-10-16 PROCEDURE — 85025 COMPLETE CBC W/AUTO DIFF WBC: CPT | Mod: HCNC

## 2019-10-16 PROCEDURE — 99397 PR PREVENTIVE VISIT,EST,65 & OVER: ICD-10-PCS | Mod: HCNC,S$GLB,, | Performed by: INTERNAL MEDICINE

## 2019-10-16 PROCEDURE — 99999 PR PBB SHADOW E&M-EST. PATIENT-LVL III: ICD-10-PCS | Mod: PBBFAC,HCNC,, | Performed by: INTERNAL MEDICINE

## 2019-10-16 PROCEDURE — 82306 VITAMIN D 25 HYDROXY: CPT | Mod: HCNC

## 2019-10-16 PROCEDURE — 99397 PER PM REEVAL EST PAT 65+ YR: CPT | Mod: HCNC,S$GLB,, | Performed by: INTERNAL MEDICINE

## 2019-10-16 PROCEDURE — 99999 PR PBB SHADOW E&M-EST. PATIENT-LVL V: CPT | Mod: PBBFAC,HCNC,, | Performed by: NURSE PRACTITIONER

## 2019-10-16 PROCEDURE — 80061 LIPID PANEL: CPT | Mod: HCNC

## 2019-10-16 PROCEDURE — 99999 PR PBB SHADOW E&M-EST. PATIENT-LVL V: ICD-10-PCS | Mod: PBBFAC,HCNC,, | Performed by: NURSE PRACTITIONER

## 2019-10-16 PROCEDURE — G0439 PR MEDICARE ANNUAL WELLNESS SUBSEQUENT VISIT: ICD-10-PCS | Mod: HCNC,S$GLB,, | Performed by: NURSE PRACTITIONER

## 2019-10-16 PROCEDURE — 84443 ASSAY THYROID STIM HORMONE: CPT | Mod: HCNC

## 2019-10-16 PROCEDURE — 99999 PR PBB SHADOW E&M-EST. PATIENT-LVL III: CPT | Mod: PBBFAC,HCNC,, | Performed by: INTERNAL MEDICINE

## 2019-10-16 NOTE — PATIENT INSTRUCTIONS
Counseling and Referral of Other Preventative  (Italic type indicates deductible and co-insurance are waived)    Patient Name: Katiana Hagan  Today's Date: 10/16/2019    Health Maintenance       Date Due Completion Date    Shingles Vaccine (1 of 2) 12/25/1997 ---    Pneumococcal Vaccine (65+ Low/Medium Risk) (2 of 2 - PPSV23) 09/06/2018 9/6/2017    Fecal Occult Blood Test (FOBT)/[employer]Kit 09/12/2018 9/12/2017    Influenza Vaccine (1) 09/01/2019 11/24/2014    DEXA SCAN 09/19/2020 9/19/2017    Mammogram 09/24/2020 9/24/2018    Lipid Panel 09/24/2023 9/24/2018    TETANUS VACCINE 06/10/2029 6/10/2019    Override on 9/6/2017: Declined        No orders of the defined types were placed in this encounter.    The following information is provided to all patients.  This information is to help you find resources for any of the problems found today that may be affecting your health:                Living healthy guide: www.[facility].gov      Understanding Diabetes: www.diabetes.org      Eating healthy: www.cdc.gov/healthyweight      CDC home safety checklist: www.cdc.gov/steadi/patient.html      Agency on Aging: www.goea.[place].gov      Alcoholics anonymous (AA): www.aa.org      Physical Activity: www.selam.nih.gov/rz2qpyr      Tobacco use: www.quitwithusla.org

## 2019-10-16 NOTE — TELEPHONE ENCOUNTER
Pt is here. She just had an HRA. She is requesting labs. Her annual exam is at 2pm. Pt had coffee with cream. Can pt still get labs

## 2019-10-16 NOTE — PROGRESS NOTES
"Katiana Hagan presented for a  Medicare AWV and comprehensive Health Risk Assessment today. The following components were reviewed and updated:    · Medical history  · Family History  · Social history  · Allergies and Current Medications  · Health Risk Assessment  · Health Maintenance  · Care Team     ** See Completed Assessments for Annual Wellness Visit within the encounter summary.**       The following assessments were completed:  · Living Situation  · CAGE  · Depression Screening  · Timed Get Up and Go  · Whisper Test  · Cognitive Function Screening  · Nutrition Screening  · ADL Screening  · PAQ Screening        Vitals:    10/16/19 1040   BP: 130/84   BP Location: Left arm   Patient Position: Sitting   Pulse: 68   Weight: 66.6 kg (146 lb 13.2 oz)   Height: 5' 5" (1.651 m)     Body mass index is 24.43 kg/m².     Physical Exam   Constitutional: She is oriented to person, place, and time. She appears well-developed and well-nourished. No distress.   HENT:   Head: Normocephalic and atraumatic.   Eyes: No scleral icterus.   Neck: Normal range of motion. Neck supple.   Cardiovascular: Normal rate, regular rhythm, normal heart sounds and intact distal pulses.   Pulmonary/Chest: Effort normal and breath sounds normal. No respiratory distress.   Abdominal: She exhibits no distension.   Musculoskeletal: Normal range of motion. She exhibits no edema.   Neurological: She is alert and oriented to person, place, and time.   Skin: Skin is warm and dry. She is not diaphoretic.   Psychiatric: She has a normal mood and affect. Her behavior is normal.         Diagnoses and health risks identified today and associated recommendations/orders:    1. Encounter for preventive health examination  Assessments completed  Preventive health recommendations reviewed    2. Hyperlipidemia, unspecified hyperlipidemia type  Stable.   Followed by PCP.     3. Urge incontinence  Stable.   She was recently given Ditropan by her gynecologist.  Has " not started this yet  Followed by PCP/obgyn.     4. Vitamin D deficiency  Stable.   Controlled with current medical therapy  Followed by PCP.     5. Anxiety  Stable.   She is no longer taking Celexa.  She feels like her anxiety improved and that she does not currently need the medication.  Followed by PCP.     6. Venous insufficiency of both lower extremities  Stable.   Followed by PCP.     7. Decreased hearing of both ears  Normal whisper testing.  Patient has perceived hearing loss in both ears.  Small amount of cerumen noted in bilateral ear canals.  Patient is interested in a referral to audiology and ENT.    - Ambulatory Referral to ENT  - Ambulatory Referral to Audiology    8. BMI 24.0-24.9, adult  Stable.   Exercise promoted  Followed by PCP.     Provided Katiana with a 5-10 year written screening schedule and personal prevention plan. Recommendations were developed using the USPSTF age appropriate recommendations. Education, counseling, and referrals were provided as needed. After Visit Summary printed and given to patient which includes a list of additional screenings\tests needed.    Follow up today (on 10/16/2019) for a routine visit with your primary care provider.    Nurys Dalton, FELIPE

## 2019-10-16 NOTE — PROGRESS NOTES
Subjective:       Patient ID: Katiana Hagan is a 73 y.o. female.    Chief Complaint: Annual Exam    Patient in for annual exam.  She actually stopped taking the Celexa for anxiety stating things improved.  She has stable history of dyslipidemia urgent continence venous insufficiency.  She denies any GI or  blood.  She is due for a colonoscopy but wants to wait until after the 1st of the year.  She will complete a fit kit..  She is up-to-date with vaccines.  For her wellness visit earlier today and audiology evaluation was placed.  She will schedule that on her own.     Review of Systems   Constitutional: Negative for appetite change, chills and fever.   HENT: Positive for hearing loss. Negative for nosebleeds, sinus pain and sore throat.    Eyes: Negative for visual disturbance.   Respiratory: Negative for cough, shortness of breath and wheezing.    Cardiovascular: Negative for chest pain and leg swelling.   Gastrointestinal: Negative for abdominal pain, constipation and diarrhea.   Genitourinary: Negative for difficulty urinating and hematuria.   Musculoskeletal: Negative for neck pain and neck stiffness.   Skin: Negative for pallor and rash.   Neurological: Negative for headaches.   Psychiatric/Behavioral: Negative for dysphoric mood and suicidal ideas. The patient is not nervous/anxious.        Objective:      Physical Exam   Constitutional: She is oriented to person, place, and time. She appears well-developed and well-nourished. No distress.   HENT:   Head: Normocephalic and atraumatic.   Right Ear: External ear normal.   Left Ear: External ear normal.   Mouth/Throat: Oropharynx is clear and moist. No oropharyngeal exudate.   Slight cerumen was removed from the right ear   Eyes: Pupils are equal, round, and reactive to light. Conjunctivae are normal. No scleral icterus.   Neck: Normal range of motion. Neck supple. No thyromegaly present.   Cardiovascular: Normal rate and regular rhythm.   No murmur  heard.  Pulmonary/Chest: Effort normal and breath sounds normal. She has no wheezes.   Abdominal: Soft. Bowel sounds are normal. She exhibits no distension. There is no tenderness.   Musculoskeletal: She exhibits no tenderness.   Lymphadenopathy:     She has no cervical adenopathy.   Neurological: She is alert and oriented to person, place, and time.   Skin: No rash noted.   Psychiatric: She has a normal mood and affect.       Assessment:       1. Routine physical examination    2. Vitamin D deficiency    3. Urge incontinence    4. Hyperlipidemia, unspecified hyperlipidemia type    5. Anxiety    6. Venous insufficiency of both lower extremities        Plan:       Katiana was seen today for annual exam.    Diagnoses and all orders for this visit:    Routine physical examination    Vitamin D deficiency    Urge incontinence    Hyperlipidemia, unspecified hyperlipidemia type    Anxiety    Venous insufficiency of both lower extremities          Review pending labs.  Continue to follow-up annually

## 2019-10-23 ENCOUNTER — HOSPITAL ENCOUNTER (OUTPATIENT)
Dept: RADIOLOGY | Facility: HOSPITAL | Age: 74
Discharge: HOME | End: 2019-10-23
Attending: OBSTETRICS & GYNECOLOGY
Payer: MEDICARE

## 2019-10-23 DIAGNOSIS — Z12.31 SCREENING MAMMOGRAM, ENCOUNTER FOR: ICD-10-CM

## 2019-10-23 PROCEDURE — 77063 MAMMO DIGITAL SCREENING BILAT WITH TOMOSYNTHESIS_CAD: ICD-10-PCS | Mod: 26,HCNC,, | Performed by: RADIOLOGY

## 2019-10-23 PROCEDURE — 77067 SCR MAMMO BI INCL CAD: CPT | Mod: TC,HCNC

## 2019-10-23 PROCEDURE — 77067 SCR MAMMO BI INCL CAD: CPT | Mod: 26,HCNC,, | Performed by: RADIOLOGY

## 2019-10-23 PROCEDURE — 77067 MAMMO DIGITAL SCREENING BILAT WITH TOMOSYNTHESIS_CAD: ICD-10-PCS | Mod: 26,HCNC,, | Performed by: RADIOLOGY

## 2019-10-23 PROCEDURE — 77063 BREAST TOMOSYNTHESIS BI: CPT | Mod: 26,HCNC,, | Performed by: RADIOLOGY

## 2019-11-06 DIAGNOSIS — N95.2 POSTMENOPAUSAL ATROPHIC VAGINITIS: ICD-10-CM

## 2019-11-06 RX ORDER — CONJUGATED ESTROGENS 0.62 MG/G
CREAM VAGINAL
Qty: 30 G | Refills: 0 | Status: SHIPPED | OUTPATIENT
Start: 2019-11-06 | End: 2020-02-03

## 2020-02-03 DIAGNOSIS — N95.2 POSTMENOPAUSAL ATROPHIC VAGINITIS: ICD-10-CM

## 2020-02-03 RX ORDER — CONJUGATED ESTROGENS 0.62 MG/G
CREAM VAGINAL
Qty: 30 G | Refills: 4 | Status: SHIPPED | OUTPATIENT
Start: 2020-02-03 | End: 2020-12-15 | Stop reason: SDUPTHER

## 2020-02-20 NOTE — TELEPHONE ENCOUNTER
SALBADOR to have the patient call the office.   Suturegard Intro: Intraoperative tissue expansion was performed, utilizing the SUTUREGARD device, in order to reduce wound tension.

## 2020-05-12 ENCOUNTER — PES CALL (OUTPATIENT)
Dept: ADMINISTRATIVE | Facility: CLINIC | Age: 75
End: 2020-05-12

## 2020-05-12 NOTE — PROGRESS NOTES
Ms. Peralta 





NB/OSWALDO

DD:  05/11/2020 19:52:38Voice ID:  [id number]

DT:  05/11/2020 23:57:54Report ID:  [id number] Patient: Katiana SHOEMAKER [facility] #: [id number]   Diagnosis:   No diagnosis found.   Date of start of care: 2/15/17  Date of treatment: 04/07/2017   Time in: 11:10  Time out: 12:02  Total treatment time: 52 minutes  # Visits: 6/20  Auth expiration: 12/31/17  POC expiration: 5/10/17    Outpatient Physical Therapy   Daily Note    Subjective     Pt returned today with her bladder diary partially completed stating that she really didn't like filling it out. Pt states she is really not sure if this is helping, though she does state she may be leaking less; she admits to doing her exercises some of the time but is inconsistent and is worried that this is just going to take too long and she is not up for that. She also reports that she is very busy and finds it difficult taking time for herself; she takes care of a lot of other people.    Pain: Current: 0/10    Objective     TREATMENT    Pt received individual therapeutic exercise to develop strength, coordination, endurance, motor control and core stability for 45 minutes including:    Today's session was spent on pt education discussing pt's current functional status, progress and POC due to pt concerns about necessary longevity of POC and difficulty finding time to perform her HEP. Pt arrived today with concerns about progress being slow and slightly frustrated that it could take many months to strengthen her PF muscles. We discussed the anatomy of the pelvic floor and the importance of consistency with her HEP. Pt admits to being inconsistent and noncompliant and having increased stress in her life at the moment and many other people to take care of. We discussed alternatives to her therapeutic exercises including beginning to walk for exercise regularly and taking a yoga class to decrease stress. Pt verbalizes that she is interested in these things but knows she just won't do it; though she said she might try a class and was given a recommendation. After long  discussion, pt and PT decided to continue with therapy for at least one more session in order to reassess pt's PFM function at her next visit and determine at that point whether we will continue or discharge. Pt was also instructed to increase her water intake and to practice not rushing to the bathroom when she feels urgency. Pt verbalized understanding.     Not performed:  - Kegals in supine with tactile cueing, 2 x 15 x 5''; pt performs PPT in order to activate her PFM; she continues to display much weakness, poor holding ability, complete derecruitment and decreased awareness; however, she verbalized ability to distinguish activation of her muscles (squeeze) versus drop today  - 10 quick flicks; pt unable to perform properly at this time due to decreased coordination and strength; pt performs PPT with no PFM contraction palpated; will continue to assess moving forward and incorporate into tx plan as pt's strength and coordination improves    - Supine hip flexor stretch x 2 min ea  - Supine piriformis stretch x 2 min ea  - Progressive muscle relaxation x 10 min    Pt received manual therapy for 0 minutes including: soft tissue mobilization applied to bilateral levators and obturators     Education: pt was instructed to continue with current HEP (clams, ball squeeze, TA sets with kegals, diaphragmatic breathing, and calm yusra); Mrs. Hagan verbalized understanding.    Assessment     Pt with good tolerance at today's visit though emotional and frustrated with her challenges concerning her ability to perform her exercises and find time for herself. We discussed this today and discussed whether continuing with PT or discharging was in pt's best interest. Pt will return for her next visit in order to receive reassessment of her PFM so that we can determine at that point whether the degree to which she is able to perform her exercises is effective or not. Mrs. Hagan will continue to benefit from continued skilled PT  intervention in order to develop strength, coordination, endurance, and neuromuscular control of her PFM to improve urge incontinence in order to maximize functional independence and quality of life. Will provide tactile cueing as needed until pt is able to perform PFM contraction on demand with less difficulty.     CMS Impairment/Limitation/Restriction for Urinary Problem Survey  Status Limitation G-Code CMS Severity Modifier  Intake 60% 40%  4/7/2017 46% 54% Current Status CK - At least 40 percent but less than 60 percent    Pt is making fair progress towards established goals. No educational, cultural, or spiritual goals identified.    Short Term Goals: 3/15/17  1. Pt will verbalize improved awareness of PFM activity as palpated by PT in order to improve activity involvement with HEP. Progressing, continue  2. Pt will demonstrate decreased overflow mm activity during PFM contraction. Progressing, continue  3. Pt will demonstrate increase in PFM endurance to 5 seconds in order to improve symptoms of UUI. Continue  4. Pt will tolerate HEP to improve impairments and independence with ADLs. MET 3/15/17    Long Term Goals: 12 weeks (5/10/17)  1. Pt will report ability to return home from errands, slowly enter house, place bags down and slowly walk to the bathroom to urinate without leaking.   2. Pt will report decreased need for pad use for incontinence.  3. Pt will be able to perform a pelvic floor muscle contraction long enough to inhibit bladder contraction in order to improve UI.  4. Pt will be able to maintain a normal breathing pattern during pelvic floor muscle contraction in order to progress towards independence.   5. Pt. will be I with techniques for double voiding in order to improve ability to self manage symptoms.   6. Pt will be independent with HEP and self management.     PATIENTS GOALS: to be able to get in the door after running errands without urinary leakage. (). The current impairment level  is 25 percent impaired (CJ) based on the (PFDI-Urinary) score of 25%.  He/she is expected to achieve a score of 19 percent impaired (-WN) after 10 therapy visits.    Plan     Continue with established POC, working toward PT goals.

## 2020-06-04 ENCOUNTER — TELEPHONE (OUTPATIENT)
Dept: INTERNAL MEDICINE | Facility: CLINIC | Age: 75
End: 2020-06-04

## 2020-06-04 NOTE — TELEPHONE ENCOUNTER
Left shoulder has been hurting a lot.  She states she has been having this issue for years, but recently it has become noticeable. She is in constant pain.  She wants to know what can be done to help her      Left side of the neck is hurting as well. She does not believe this is related to her shoulder pain. She's had this issue for years as well. She is requesting an ultrasound of the neck to see if something could be possibly causing this pain.

## 2020-06-04 NOTE — TELEPHONE ENCOUNTER
She should have an office visit.  Ultrasound checks blood vessels in the neck and those primarily go to the head not the arm.  A blockage of the neck blood vessels would be what could be found on an ultrasound that goes into the head such as a stroke.  It would not cause left arm symptoms or shoulder symptoms..  He should be able to evaluate for test she needs in the office in order all that is appropriate.

## 2020-06-04 NOTE — TELEPHONE ENCOUNTER
----- Message from Liila German sent at 6/4/2020  1:39 PM CDT -----  Contact: Patient [phone]  Patient is experiencing pain in left shoulder and neck.    Please call and advise.    Thank you

## 2020-06-09 ENCOUNTER — OFFICE VISIT (OUTPATIENT)
Dept: INTERNAL MEDICINE | Facility: CLINIC | Age: 75
End: 2020-06-09
Payer: MEDICARE

## 2020-06-09 VITALS
WEIGHT: 152.13 LBS | HEART RATE: 72 BPM | SYSTOLIC BLOOD PRESSURE: 132 MMHG | DIASTOLIC BLOOD PRESSURE: 80 MMHG | OXYGEN SATURATION: 95 % | BODY MASS INDEX: 25.31 KG/M2

## 2020-06-09 DIAGNOSIS — M54.9 UPPER BACK PAIN ON LEFT SIDE: Primary | ICD-10-CM

## 2020-06-09 DIAGNOSIS — R22.1 NECK MASS: ICD-10-CM

## 2020-06-09 DIAGNOSIS — M54.2 NECK PAIN ON LEFT SIDE: ICD-10-CM

## 2020-06-09 DIAGNOSIS — E78.5 HYPERLIPIDEMIA, UNSPECIFIED HYPERLIPIDEMIA TYPE: ICD-10-CM

## 2020-06-09 PROCEDURE — 99499 RISK ADDL DX/OHS AUDIT: ICD-10-PCS | Mod: S$GLB,,, | Performed by: INTERNAL MEDICINE

## 2020-06-09 PROCEDURE — 99214 PR OFFICE/OUTPT VISIT, EST, LEVL IV, 30-39 MIN: ICD-10-PCS | Mod: HCNC,S$GLB,, | Performed by: INTERNAL MEDICINE

## 2020-06-09 PROCEDURE — 99999 PR PBB SHADOW E&M-EST. PATIENT-LVL III: CPT | Mod: PBBFAC,HCNC,, | Performed by: INTERNAL MEDICINE

## 2020-06-09 PROCEDURE — 1101F PT FALLS ASSESS-DOCD LE1/YR: CPT | Mod: HCNC,CPTII,S$GLB, | Performed by: INTERNAL MEDICINE

## 2020-06-09 PROCEDURE — 1159F PR MEDICATION LIST DOCUMENTED IN MEDICAL RECORD: ICD-10-PCS | Mod: HCNC,S$GLB,, | Performed by: INTERNAL MEDICINE

## 2020-06-09 PROCEDURE — 1101F PR PT FALLS ASSESS DOC 0-1 FALLS W/OUT INJ PAST YR: ICD-10-PCS | Mod: HCNC,CPTII,S$GLB, | Performed by: INTERNAL MEDICINE

## 2020-06-09 PROCEDURE — 99214 OFFICE O/P EST MOD 30 MIN: CPT | Mod: HCNC,S$GLB,, | Performed by: INTERNAL MEDICINE

## 2020-06-09 PROCEDURE — 1159F MED LIST DOCD IN RCRD: CPT | Mod: HCNC,S$GLB,, | Performed by: INTERNAL MEDICINE

## 2020-06-09 PROCEDURE — 99999 PR PBB SHADOW E&M-EST. PATIENT-LVL III: ICD-10-PCS | Mod: PBBFAC,HCNC,, | Performed by: INTERNAL MEDICINE

## 2020-06-09 PROCEDURE — 99499 UNLISTED E&M SERVICE: CPT | Mod: S$GLB,,, | Performed by: INTERNAL MEDICINE

## 2020-06-09 RX ORDER — CYCLOBENZAPRINE HCL 5 MG
5 TABLET ORAL 3 TIMES DAILY PRN
Qty: 45 TABLET | Refills: 1 | Status: SHIPPED | OUTPATIENT
Start: 2020-06-09 | End: 2020-11-30

## 2020-06-09 NOTE — PROGRESS NOTES
Subjective:       Patient ID: Katiana Hagan is a 74 y.o. female.    Chief Complaint: Neck Pain; Shoulder Pain; and Eye Problem    HPI:  Patient with history of left upper back, shoulder blade left-sided neck pain complains of recurrent pain.  She saw back and spine last year and had of trigger point injection which helped but she felt a mass on the left side of the neck and another family member felt it as well.  She has had tenderness there and is concerned.  She had a problematic face lift plastic surgery procedure in the past and has had some issues in the head neck area since then.  It can be tender to touch or move.  No trouble swallowing.  No new rash.  She is asking for a CT scan.  She denies any trouble breathing.  No cough or sputum.     Review of Systems   Constitutional: Negative for appetite change, chills and fever.   HENT: Negative for nosebleeds, sinus pain and sore throat.         Mass on the left side of the neck   Eyes: Negative for visual disturbance.   Respiratory: Negative for cough, shortness of breath and wheezing.    Cardiovascular: Negative for chest pain and leg swelling.   Gastrointestinal: Negative for abdominal pain, constipation and diarrhea.   Genitourinary: Negative for difficulty urinating and hematuria.   Musculoskeletal: Positive for myalgias, neck pain and neck stiffness.   Skin: Negative for pallor and rash.   Neurological: Negative for headaches.   Psychiatric/Behavioral: Negative for dysphoric mood and suicidal ideas. The patient is not nervous/anxious.        Objective:      Physical Exam   Constitutional: She is oriented to person, place, and time. She appears well-developed and well-nourished. No distress.   HENT:   Head: Normocephalic and atraumatic.   Right Ear: External ear normal.   Left Ear: External ear normal.   Mouth/Throat: Oropharynx is clear and moist. No oropharyngeal exudate.   Eyes: Pupils are equal, round, and reactive to light. Conjunctivae are normal. No  scleral icterus.   Neck: Normal range of motion. Neck supple. No thyromegaly present.   Small area of fullness that may well be a small lymph node.  Patient states she feels a larger area nearly the size of the thumb   Cardiovascular: Normal rate and regular rhythm.   No murmur heard.  Pulmonary/Chest: Effort normal and breath sounds normal. She has no wheezes.   Abdominal: Soft. Bowel sounds are normal. She exhibits no distension. There is no tenderness.   Musculoskeletal: She exhibits no tenderness.   Lymphadenopathy:     She has cervical adenopathy.   Neurological: She is alert and oriented to person, place, and time.   Skin: No rash noted.   Psychiatric: She has a normal mood and affect.       Assessment:       1. Upper back pain on left side    2. Hyperlipidemia, unspecified hyperlipidemia type    3. Neck mass    4. Neck pain on left side        Plan:       Katiana was seen today for neck pain, shoulder pain and eye problem.    Diagnoses and all orders for this visit:    Upper back pain on left side  -     cyclobenzaprine (FLEXERIL) 5 MG tablet; Take 1 tablet (5 mg total) by mouth 3 (three) times daily as needed for Muscle spasms.  -     Ambulatory referral/consult to Physical/Occupational Therapy; Future  -     Creatinine, serum; Future    Hyperlipidemia, unspecified hyperlipidemia type  -     Creatinine, serum; Future    Neck mass  -     CT Soft Tissue Neck W WO Contrast; Future  -     Creatinine, serum; Future    Neck pain on left side  -     CT Soft Tissue Neck W WO Contrast; Future  -     Creatinine, serum; Future

## 2020-06-10 ENCOUNTER — LAB VISIT (OUTPATIENT)
Dept: LAB | Facility: HOSPITAL | Age: 75
End: 2020-06-10
Attending: INTERNAL MEDICINE
Payer: MEDICARE

## 2020-06-10 DIAGNOSIS — Z12.11 ENCOUNTER FOR FECAL IMMUNOCHEMICAL TEST SCREENING: ICD-10-CM

## 2020-06-10 PROCEDURE — 82274 ASSAY TEST FOR BLOOD FECAL: CPT | Mod: HCNC

## 2020-06-11 ENCOUNTER — TELEPHONE (OUTPATIENT)
Dept: INTERNAL MEDICINE | Facility: CLINIC | Age: 75
End: 2020-06-11

## 2020-06-15 ENCOUNTER — CLINICAL SUPPORT (OUTPATIENT)
Dept: REHABILITATION | Facility: HOSPITAL | Age: 75
End: 2020-06-15
Attending: INTERNAL MEDICINE
Payer: MEDICARE

## 2020-06-15 DIAGNOSIS — M54.9 UPPER BACK PAIN ON LEFT SIDE: ICD-10-CM

## 2020-06-15 DIAGNOSIS — R29.3 POOR POSTURE: ICD-10-CM

## 2020-06-15 DIAGNOSIS — Z74.09 DECREASED MOBILITY AND ENDURANCE: ICD-10-CM

## 2020-06-15 DIAGNOSIS — R29.898 DECREASED STRENGTH OF TRUNK AND BACK: ICD-10-CM

## 2020-06-15 PROCEDURE — 97110 THERAPEUTIC EXERCISES: CPT | Mod: HCNC,PN

## 2020-06-15 PROCEDURE — 97161 PT EVAL LOW COMPLEX 20 MIN: CPT | Mod: HCNC,PN

## 2020-06-15 NOTE — PLAN OF CARE
[facility]  Physical Therapy Initial Evaluation    Name: Katiana Hagan  Clinic Number: [id number]    Therapy Diagnosis:   Encounter Diagnoses   Name Primary?    Upper back pain on left side     Decreased strength of trunk and back     Decreased mobility and endurance     Poor posture      Physician: Tucker Ren MD    Physician Orders: PT Eval and Treat   Medical Diagnosis from Referral: Upper Back pain on left side  Evaluation Date: 6/15/2020  Authorization Period Expiration: 6/9/2021  Plan of Care Expiration: 9/15/2020  Visit # / Visits authorized: 1/ 1    Time In: 10:36a  Time Out: 11:15a  Total Billable Time: 39 minutes    Precautions: Standard    Subjective   Date of onset: 1 year ago   History of current condition - Katiana reports: she has pain in her L shoulder blade. It doesn't hurt all the time but it has been like this for a few years. She had an injection last year and it seemed to help. She has some arthritis. It used to start off when driving for too long. She says she doesn't lean against it because of the pain. Feels like a pressure pain like someone is pushing on that point. She has some neck pain on the left side as well but does not think they are related. She denies numbness and tingling and weakness. Sometimes she has problems moving her L shoulder and doing exercises with her left shoulder. No increase in pain with deep breathing. She is right handed so she can do everything she needs to and tries to avoid using the left arm.      Medical History:   Past Medical History:   Diagnosis Date    Other and unspecified hyperlipidemia 10/29/2013       Surgical History:   Katiana Hagan  has a past surgical history that includes Dilation and curettage of uterus; Myomectomy; Appendectomy (age 18); Breast augmentation (1988); and Cosmetic surgery (03/08/2016).    Medications:   Katiana has a current medication list which includes the following prescription(s):  calcium-vitamin d3-vitamin k, cartilage/collagen/bor/hyalur, cetirizine, chol/gl/ser/rna/phen/prg/hb150, cholecalciferol (vitamin d3), citalopram, conjugated estrogens, cyclobenzaprine, lactobacillus acidophilus, mv-mn/c/glutamin/lysin/[id number], NON FORMULARY MEDICATION, NON FORMULARY MEDICATION, oxybutynin, premarin, and turmeric.    Allergies:   Review of patient's allergies indicates:   Allergen Reactions    Acrylates/beheneth-25 methacrylate copolymer      Fingers get red and inflammed (fake nails)    Hydrocortisone Itching    Methylprednisolone aceponate     Prednisone Nausea And Vomiting    Tixocortol pivalate Itching        Imaging, none    Prior Therapy: no   Social History: no stairs  Occupation: retired   Prior Level of Function: independent  Current Level of Function: independent     Pain:  Current 2/10, worst 8/10, best 0/10   Location: left shoulder blade, upper back    Description: pressure  Aggravating Factors: touching; sitting upright/standing   Easing Factors: injection and nothing    Pts goals: decrease the pain     Objective     Posture: rounded shoulders; increased thoracic flexion     Cervical Range of Motion:    Degrees Pain   Flexion WFL none     Extension WFL  none     Right Rotation WFL none     Left Rotation WFL none     Right Side Bending 15 deg  Increased pressure over L scapular   Left Side Bending 25 deg  none     Thoracic Spine Range of Motion   Degrees Pain   Flexion WFL none     Extension WFL Increased pressure      Right Rotation WFL Mild increase in pressure     Left Rotation WFL none     Right Side Bending WFL Increased pressure    Left Side Bending WFL  None       Shoulder Range of Motion:   Shoulder Left Right   Flexion 180 180   Abduction 180 (increased pain over L scapula) 180   ER WFL WFL   IR WFL WFL     Upper Extremity Strength  (R) UE  (L) UE    Shoulder flexion: 4/5 Shoulder flexion: 4/5   Shoulder Abduction: 4/5 Shoulder abduction: 4/5   Shoulder ER 4-/5 Shoulder  ER 4-/5   Shoulder IR 4-/5 Shoulder IR 4-/5 (some pain over L scapula)    Lower Trap 3/5 Lower Trap 3-/5   Middle Trap 3+/5 Middle Trap 3+/5   Rhomboids 3+/5 Rhomboids 3/5   Serratus anterior  4/5   Serratus anterior 3+/5 (with pain over L scapula)     Special Tests:  Distraction -   Compression -   Spurlings -     Joint Mobility    Thoracic mobility: hypomobile upper thoracic joint segments with increased symptoms with PA over T6-T8 spinous processes     Palpation: ttp over L lower trap, subscapularis, medial and inferior border of scapula, anterior medial surface of scapula (serratus anterior)     Sensation: intact to light touch     CMS Impairment/Limitation/Restriction for FOTO Lumbar Spine Survey    Therapist reviewed FOTO scores for Katiana Hagan on 6/15/2020.   FOTO documents entered into [employer] - see Media section.    Limitation Score: 35%  Category: Mobility    Current : CJ = at least 20% but < 40% impaired, limited or restricted  Goal: CJ = at least 20% but < 40% impaired, limited or restricted       TREATMENT   Treatment Time In: 11:05a  Treatment Time Out: 11:15a  Total Treatment time separate from Evaluation: 10 minutes    Katiana received therapeutic exercises to develop strength, endurance, ROM and posture for 10 minutes including:  Open books x10   Scapular depression x5, 5 sec hold   scap retracts x5, 5 sec hold   Cross body stretch [id number]     Home Exercises and Patient Education Provided    Education provided:   - PT plan   - HEP     Written Home Exercises Provided: yes.  Exercises were reviewed and Katiana was able to demonstrate them prior to the end of the session.  Katiana demonstrated good  understanding of the education provided.     See EMR under Patient Instructions for exercises provided 6/15/2020.    Assessment   Katiana is a 74 y.o. female referred to outpatient Physical Therapy with a medical diagnosis of Upper Back pain on left side. Pt presents with complaints of L upper  back/shoulder/shoulderblade pain. Upon examination, pt demonstrates increased symptoms with trunk rotation and sidebending to the R and extension, cervical R sidebending, and shoulder abduction and elevation. She demonstrate trunk and UE weakness, decreased thoracic and cervical mobility, soft tissue dysfunction, impaired joint mobility, and decreased tolerance to functional mobility. Pt had tenderness to palpation over L medial and inferior border of scapula, anterior surface of scapula, subscapularis, and lower trap. Due to symptoms, pt is unable to participate in pain free daily activities and driving without increased symptoms. She is appropriate for physical therapy and is expected to improve with patient education, tissue relaxation and stretching, and overall trunk and shoulder strengthening.      Pt prognosis is Good.   Pt will benefit from skilled outpatient Physical Therapy to address the deficits stated above and in the chart below, provide pt/family education, and to maximize pt's level of independence.     Plan of care discussed with patient: Yes  Pt's spiritual, cultural and educational needs considered and patient is agreeable to the plan of care and goals as stated below:     Anticipated Barriers for therapy: none    Medical Necessity is demonstrated by the following  History  Co-morbidities and personal factors that may impact the plan of care Co-morbidities:   hyperlipidemia    Personal Factors:   no deficits     low   Examination  Body Structures and Functions, activity limitations and participation restrictions that may impact the plan of care Body Regions:   neck  back  upper extremities  trunk    Body Systems:    ROM  strength  gross coordinated movement  transfers  transitions  motor control  motor learning    Participation Restrictions:   Minimal     Activity limitations:   Learning and applying knowledge  no deficits    General Tasks and Commands  no deficits    Communication  no  deficits    Mobility  lifting and carrying objects  driving (bike, car, motorcycle)    Self care  washing oneself (bathing, drying, washing hands)  dressing    Domestic Life  shopping  cooking  doing house work (cleaning house, washing dishes, laundry)  assisting others    Interactions/Relationships  no deficits    Life Areas  no deficits    Community and Social Life  community life  recreation and leisure         low   Clinical Presentation stable and uncomplicated low   Decision Making/ Complexity Score: low     Goals:  Short Term GOALS: 4 weeks. Pt agrees with goals set.  1. Patient demonstrates independence with HEP.   2. Patient demonstrates independence with Postural Awareness.   3. Patient demonstrates ability to drive for 30 minutes at a time with no increase in pain to improve community mobility.   4. Patient will report pain of 5/10 at worst, on 0-10 pain scale, with all activity.   5. Patient demonstrates increased strength BUE's by 1/3 muscle grade or greater to improve tolerance to functional activities pain free.      Long Term GOALS: 8 weeks. Pt agrees with goals set.  1. Patient demonstrates improved cervical, thoracic, and B shoulder ROM to WFL in all planes with no increase in pain to improve tolerance to functional activities.  2. Patient demonstrates increased strength BUE's to 4+/5 or greater to improve tolerance to functional activities pain free.  3. Patient demonstrates increased strength in lower trap, middle trap, and rhomboids to 4/5 or greater to improve postural stability for increased amount of time.    4. Patient demonstrates improved overall function per FOTO Neck Survey to 33 Limitation or less.   5. Patient demonstrates ability to perform her typical household daily activities with no difficulty or increase in pain to improve independence.     Plan   Plan of care Certification: 6/15/2020 to 9/15/2020.    Outpatient Physical Therapy 1-2 times weekly for 12 weeks to include the  following interventions: Manual Therapy, Moist Heat/ Ice, Neuromuscular Re-ed, Patient Education, Self Care, Therapeutic Activites and Therapeutic Exercise.     Dolores Lange, PT  06/15/2020    I have seen the patient, reviewed the therapist's plan of care, and I agree with the plan of care.      I certify the need for these services furnished under this plan of treatment and while under my care.     ___________________ ________ Physician/Referring Practitioner            ___________________________ Date of Signature

## 2020-06-16 ENCOUNTER — HOSPITAL ENCOUNTER (OUTPATIENT)
Dept: RADIOLOGY | Facility: HOSPITAL | Age: 75
Discharge: HOME | End: 2020-06-16
Attending: INTERNAL MEDICINE
Payer: MEDICARE

## 2020-06-16 DIAGNOSIS — M54.2 NECK PAIN ON LEFT SIDE: ICD-10-CM

## 2020-06-16 DIAGNOSIS — R22.1 NECK MASS: ICD-10-CM

## 2020-06-16 PROCEDURE — 70491 CT SOFT TISSUE NECK WITH CONTRAST: ICD-10-PCS | Mod: 26,HCNC,, | Performed by: RADIOLOGY

## 2020-06-16 PROCEDURE — 70491 CT SOFT TISSUE NECK W/DYE: CPT | Mod: TC,HCNC

## 2020-06-16 PROCEDURE — 25500020 PHARM REV CODE 255: Mod: HCNC | Performed by: INTERNAL MEDICINE

## 2020-06-16 PROCEDURE — 70491 CT SOFT TISSUE NECK W/DYE: CPT | Mod: 26,HCNC,, | Performed by: RADIOLOGY

## 2020-06-16 RX ADMIN — IOHEXOL 75 ML: 350 INJECTION, SOLUTION INTRAVENOUS at 02:06

## 2020-06-17 LAB
CREAT SERPL-MCNC: 0.8 MG/DL (ref 0.5–1.4)
SAMPLE: NORMAL

## 2020-06-18 LAB — HEMOCCULT STL QL IA: NEGATIVE

## 2020-06-26 ENCOUNTER — CLINICAL SUPPORT (OUTPATIENT)
Dept: REHABILITATION | Facility: HOSPITAL | Age: 75
End: 2020-06-26
Attending: INTERNAL MEDICINE
Payer: MEDICARE

## 2020-06-26 DIAGNOSIS — R29.3 POOR POSTURE: ICD-10-CM

## 2020-06-26 DIAGNOSIS — Z74.09 DECREASED MOBILITY AND ENDURANCE: ICD-10-CM

## 2020-06-26 DIAGNOSIS — R29.898 DECREASED STRENGTH OF TRUNK AND BACK: ICD-10-CM

## 2020-06-26 PROCEDURE — 97110 THERAPEUTIC EXERCISES: CPT | Mod: HCNC,PN,CQ

## 2020-06-26 NOTE — PROGRESS NOTES
Physical Therapy Daily Treatment Note     Name: Katiana Hagan  [facility] Number: [id number]    Therapy Diagnosis:   Encounter Diagnoses   Name Primary?    Decreased strength of trunk and back     Decreased mobility and endurance     Poor posture      Physician: Tucker Ren MD    Visit Date: 6/26/2020    Physician Orders: PT Eval and Treat   Medical Diagnosis from Referral: Upper Back pain on left side  Evaluation Date: 6/15/2020  Authorization Period Expiration: 6/9/2021  Plan of Care Expiration: 9/15/2020  Visit # / Visits authorized: 1/ 30 (2nd visit)     Time In: 10:45a  Time Out: 11:28a  Total Billable Time: 40 minutes     Precautions: Standard    Subjective     Pt reports: feeling pretty good but knows the pain is there.  She was compliant with home exercise program.  Response to previous treatment: N/A  Functional change: ongoing    Pain: 0/10  Location: left torso      Pts goals: decrease the pain     Objective     Katiana received therapeutic exercises to develop strength, endurance, ROM and posture for 40 minutes including:    Thoracic Extension Self-Mobilization x 20  Supine Thoracic Self-Mobilization on 1/2 Foam Roll 10 each       -Serratus Punches       - Shoulder Rolls Fwd       - Bkwd Rolls       - Hugs       -Swimmers    Open books x20  Sidelying GHJ ER x 30  Prone Rows 4# x 30  Prone Ext 1# x 30  Prone LT Activation x 30  Push Up Plus x 30 @ Mat Height    Scapular depression x5, 5 sec hold   scap retracts x5, 5 sec hold   Cross body stretch [id number]     Home Exercises Provided and Patient Education Provided     Education provided:   Cont to perform HEP as provided.     Written Home Exercises Provided: Patient instructed to cont prior HEP.  Exercises were reviewed and Katiana was able to demonstrate them prior to the end of the session.  Katiana demonstrated good  understanding of the education provided.     See EMR under Patient Instructions for exercises provided 06/15/2020.    Assessment      Pt presents to therapy without any symptoms but notes it was there yesterday.  Promoted TSpine mobility with a focus on extension for reduced symptoms.  Pt notes those movements were targeting the right areas.  Developed posterior GHJ strengthening for improved posture and stability within new available ROM.  Pt tolerated well without increase in symptoms.      Katiana is progressing well towards her goals.   Pt prognosis is Good.     Pt will continue to benefit from skilled outpatient physical therapy to address the deficits listed in the problem list box on initial evaluation, provide pt/family education and to maximize pt's level of independence in the home and community environment.     Pt's spiritual, cultural and educational needs considered and pt agreeable to plan of care and goals.    Anticipated barriers to physical therapy: none    Goals:   Short Term GOALS: 4 weeks. Pt agrees with goals set.  1. Patient demonstrates independence with HEP.   2. Patient demonstrates independence with Postural Awareness.   3. Patient demonstrates ability to drive for 30 minutes at a time with no increase in pain to improve community mobility.   4. Patient will report pain of 5/10 at worst, on 0-10 pain scale, with all activity.   5. Patient demonstrates increased strength BUE's by 1/3 muscle grade or greater to improve tolerance to functional activities pain free.      Long Term GOALS: 8 weeks. Pt agrees with goals set.  1. Patient demonstrates improved cervical, thoracic, and B shoulder ROM to WFL in all planes with no increase in pain to improve tolerance to functional activities.  2. Patient demonstrates increased strength BUE's to 4+/5 or greater to improve tolerance to functional activities pain free.  3. Patient demonstrates increased strength in lower trap, middle trap, and rhomboids to 4/5 or greater to improve postural stability for increased amount of time.    4. Patient demonstrates improved overall function  per FOTO Neck Survey to 33 Limitation or less.   5. Patient demonstrates ability to perform her typical household daily activities with no difficulty or increase in pain to improve independence.     Plan     Plan of care Certification: 6/15/2020 to 9/15/2020.     Outpatient Physical Therapy 1-2 times weekly for 12 weeks to include the following interventions: Manual Therapy, Moist Heat/ Ice, Neuromuscular Re-ed, Patient Education, Self Care, Therapeutic Activites and Therapeutic Exercise.     Cont skilled PT session towards PT and patient's goals.    Damián Genao, PTA   06/26/2020

## 2020-07-10 ENCOUNTER — CLINICAL SUPPORT (OUTPATIENT)
Dept: REHABILITATION | Facility: HOSPITAL | Age: 75
End: 2020-07-10
Attending: INTERNAL MEDICINE
Payer: MEDICARE

## 2020-07-10 DIAGNOSIS — Z74.09 DECREASED MOBILITY AND ENDURANCE: ICD-10-CM

## 2020-07-10 DIAGNOSIS — R29.3 POOR POSTURE: ICD-10-CM

## 2020-07-10 DIAGNOSIS — R29.898 DECREASED STRENGTH OF TRUNK AND BACK: ICD-10-CM

## 2020-07-10 PROCEDURE — 97110 THERAPEUTIC EXERCISES: CPT | Mod: HCNC,PN

## 2020-07-10 NOTE — PROGRESS NOTES
Physical Therapy Daily Treatment Note     Name: Katiana Hagan  [facility] Number: [id number]    Therapy Diagnosis:   Encounter Diagnoses   Name Primary?    Decreased strength of trunk and back     Decreased mobility and endurance     Poor posture      Physician: Tucker Ren MD    Visit Date: 7/10/2020    Physician Orders: PT Eval and Treat   Medical Diagnosis from Referral: Upper Back pain on left side  Evaluation Date: 6/15/2020  Authorization Period Expiration: 6/9/2021  Plan of Care Expiration: 9/15/2020  Visit # / Visits authorized: 2/ 30 (total visit 3)     Time In: 1125 (10 mins late)  Time Out: 1200  Total Billable Time: 35 minutes     Precautions: Standard    Subjective     Pt reports: she felt pain in L shoulder blade while prolonged driving  She was compliant with home exercise program.  Response to previous treatment: No pain currently  Functional change: ongoing    Pain: 0/10  Location: left torso      Pts goals: decrease the pain     Objective     Katiana received therapeutic exercises to develop strength, endurance, ROM and posture for 40 minutes including:    +UBE 3'/3'  Thoracic Extension Self-Mobilization x 20  Supine Thoracic Self-Mobilization on 1/2 Foam Roll 15x each       -Serratus Punches       - Shoulder Rolls Fwd       - Bkwd Rolls       - Hugs       -Swimmers       - Horiz Abd YTB     Open books x20 ea UE  Sidelying GHJ ER x 30  Prone Rows 4# x 30  Prone Ext 2# x 30  Prone LT Activation x 30  Push Up Plus x 30 @ Mat Height    Scapular depression x5, 5 sec hold   scap retracts x5, 5 sec hold   Cross body stretch [id number]     Home Exercises Provided and Patient Education Provided     Education provided:   Cont to perform HEP as provided.     Written Home Exercises Provided: Patient instructed to cont prior HEP.  Exercises were reviewed and Katiana was able to demonstrate them prior to the end of the session.  Katiana demonstrated good  understanding of the education provided.     See  EMR under Patient Instructions for exercises provided 06/15/2020.    Assessment     Pt tolerated treatment session well, with no significant worsening of symptoms. Pt presented to session pain-free, and maintained status throughout therex program. Mild difficulty performing push-up plus from mat, requiring cueing for core engagement and proper scapular movement pattern.     Katiana is progressing well towards her goals.   Pt prognosis is Good.     Pt will continue to benefit from skilled outpatient physical therapy to address the deficits listed in the problem list box on initial evaluation, provide pt/family education and to maximize pt's level of independence in the home and community environment.     Pt's spiritual, cultural and educational needs considered and pt agreeable to plan of care and goals.    Anticipated barriers to physical therapy: none    Goals:   Short Term GOALS: 4 weeks. Pt agrees with goals set.  1. Patient demonstrates independence with HEP. in progress  2. Patient demonstrates independence with Postural Awareness.   3. Patient demonstrates ability to drive for 30 minutes at a time with no increase in pain to improve community mobility. in progress  4. Patient will report pain of 5/10 at worst, on 0-10 pain scale, with all activity. in progress  5. Patient demonstrates increased strength BUE's by 1/3 muscle grade or greater to improve tolerance to functional activities pain free. in progress     Long Term GOALS: 8 weeks. Pt agrees with goals set.  1. Patient demonstrates improved cervical, thoracic, and B shoulder ROM to WFL in all planes with no increase in pain to improve tolerance to functional activities. in progress  2. Patient demonstrates increased strength BUE's to 4+/5 or greater to improve tolerance to functional activities pain free. in progress  3. Patient demonstrates increased strength in lower trap, middle trap, and rhomboids to 4/5 or greater to improve postural stability for  increased amount of time.   in progress  4. Patient demonstrates improved overall function per FOTO Neck Survey to 33 Limitation or less.  in progress  5. Patient demonstrates ability to perform her typical household daily activities with no difficulty or increase in pain to improve independence.  in progress    Plan     Plan of care Certification: 6/15/2020 to 9/15/2020.     Progress kassi-scapular and postural training as tolerated    Herb Rodriguez, PT   07/10/2020

## 2020-07-16 NOTE — PROGRESS NOTES
Physical Therapy Daily Treatment Note     Name: Katiana Hagan  [facility] Number: [id number]    Therapy Diagnosis:   Encounter Diagnoses   Name Primary?    Decreased strength of trunk and back     Decreased mobility and endurance     Poor posture      Physician: Tucker Ren MD    Visit Date: 7/17/2020    Physician Orders: PT Eval and Treat   Medical Diagnosis from Referral: Upper Back pain on left side  Evaluation Date: 6/15/2020  Authorization Period Expiration: 6/9/2021  Plan of Care Expiration: 9/15/2020  Visit # / Visits authorized: 3/ 30 (+eval)     Time In: 11:20a  Time Out: 12:04a  Total Billable Time: 44 minutes     Precautions: Standard    Subjective     Pt reports: She had a lot of pain yesterday in her L shoulder blade region and she thinks maybe it was from doing a lot of work yesterday. She doesn't think she is doing much better since starting PT - the pain is still weird and variable and hasn't changed much   She was compliant with home exercise program.  Response to previous treatment: No pain currently  Functional change: ongoing    Pain: 3/10  Location: left torso      Pts goals: decrease the pain     Objective   CMS Impairment/Limitation/Restriction for FOTO Lumbar Spine Survey  Status Limitation G-Code CMS Severity Modifier  Intake 65% 35%  Predicted 67% 33% Goal Status+ CJ - At least 20 percent but less than 40 percent  7/17/2020 72% 28% Current Status CJ - At least 20 percent but less than 40 percent    Cervical Range of Motion:     Degrees Pain   Flexion WFL none      Extension WFL  none      Right Rotation WFL none      Left Rotation WFL none      Right Side Bending 25 deg  None    Left Side Bending 25 deg  none      Thoracic Spine Range of Motion    Degrees Pain   Flexion WFL none      Extension WFL Feels it - but no pain        Right Rotation WFL None       Left Rotation WFL Feels it - but no pain       Right Side Bending WFL None    Left Side Bending WFL  None       Shoulder Range  of Motion:   Shoulder Left Right   Flexion 180 180   Abduction 180 (feels it, but no pain) 180   ER WFL WFL   IR WFL WFL      Upper Extremity Strength  (R) UE   (L) UE     Shoulder flexion: 4+/5 Shoulder flexion: 4+/5   Shoulder Abduction: 4+/5 Shoulder abduction: 4+/5   Shoulder ER 4/5 Shoulder ER 4/5   Shoulder IR 4/5 Shoulder IR 4/5    Lower Trap 3+/5 Lower Trap 3/5   Middle Trap 4-/5 Middle Trap 4-/5   Rhomboids 3+/5 Rhomboids 3+/5   Serratus anterior         4/5                               Serratus anterior         4/5 (with pain over L scapula)     Katiana received therapeutic exercises to develop strength, endurance, ROM and posture for 44 minutes including:    UBE 3'/3'  +Rhomboid stretch [id number]   +Y lift off on wall x10   +D2 flexion, YTB x10   +Bilateral shoulder extension with RTB 2x10   +Bear hugs with GTB 2x10   +Self massage with tennis ball   Push Up Plus x 30 @ Mat Height  Prone Rows 4# x 30  Prone Ext 2# x 30  Prone LT Activation x 30    Thoracic Extension Self-Mobilization x 20  Supine Thoracic Self-Mobilization on 1/2 Foam Roll 15x each       -Serratus Punches       - Shoulder Rolls Fwd       - Bkwd Rolls       - Hugs       -Swimmers       - Horiz Abd YTB   Sidelying GHJ ER x 30    Home Exercises Provided and Patient Education Provided     Education provided:   Cont to perform HEP as provided.     Written Home Exercises Provided: Patient instructed to cont prior HEP.  Exercises were reviewed and Katiana was able to demonstrate them prior to the end of the session.  Katiana demonstrated good  understanding of the education provided.     See EMR under Patient Instructions for exercises provided 7/17/2020.    Assessment     Pt tolerated treatment session well. A progress note was performed today. She has met 3/5 STG and 1/5 LTG so far and is steadily progressing towards her other goals. She continues with significant pain in her L lower trap/scapular region that is difficult to pinpoint but  increases with prolonged driving and high level activities around her home. She demonstrates postural strength weakness and pain during lower trap and serratus anterior muscle activity. She continues requiring cueing for improved scapular control and stability during exercises. She may benefit from lower trap and serratus MET as well as increasing postural awareness (scap retracts) while driving to improve symptom flare ups. She is appropriate to continue with therapy at this time due to noted limitations and impairments.     Katiana is progressing well towards her goals.   Pt prognosis is Good.     Pt will continue to benefit from skilled outpatient physical therapy to address the deficits listed in the problem list box on initial evaluation, provide pt/family education and to maximize pt's level of independence in the home and community environment.     Pt's spiritual, cultural and educational needs considered and pt agreeable to plan of care and goals.    Anticipated barriers to physical therapy: none    Goals:   Short Term GOALS: 4 weeks. Pt agrees with goals set.  1. Patient demonstrates independence with HEP. goal met   2. Patient demonstrates independence with Postural Awareness. goal met   3. Patient demonstrates ability to drive for 30 minutes at a time with no increase in pain to improve community mobility. in progress  4. Patient will report pain of 5/10 at worst, on 0-10 pain scale, with all activity. in progress  5. Patient demonstrates increased strength BUE's by 1/3 muscle grade or greater to improve tolerance to functional activities pain free. Goal met      Long Term GOALS: 8 weeks. Pt agrees with goals set.  1. Patient demonstrates improved cervical, thoracic, and B shoulder ROM to WFL in all planes with no increase in pain to improve tolerance to functional activities. in progress  2. Patient demonstrates increased strength BUE's to 4+/5 or greater to improve tolerance to functional activities  pain free. in progress  3. Patient demonstrates increased strength in lower trap, middle trap, and rhomboids to 4/5 or greater to improve postural stability for increased amount of time.   in progress  4. Patient demonstrates improved overall function per FOTO Neck Survey to 33 Limitation or less. Goal met   5. Patient demonstrates ability to perform her typical household daily activities with no difficulty or increase in pain to improve independence.  in progress    Plan     Plan of care Certification: 6/15/2020 to 9/15/2020.     Progress kassi-scapular and postural training as tolerated    Dolores Lange, PT   07/17/2020

## 2020-07-17 ENCOUNTER — CLINICAL SUPPORT (OUTPATIENT)
Dept: REHABILITATION | Facility: HOSPITAL | Age: 75
End: 2020-07-17
Attending: INTERNAL MEDICINE
Payer: MEDICARE

## 2020-07-17 DIAGNOSIS — R29.898 DECREASED STRENGTH OF TRUNK AND BACK: ICD-10-CM

## 2020-07-17 DIAGNOSIS — R29.3 POOR POSTURE: ICD-10-CM

## 2020-07-17 DIAGNOSIS — Z74.09 DECREASED MOBILITY AND ENDURANCE: ICD-10-CM

## 2020-07-17 PROCEDURE — 97110 THERAPEUTIC EXERCISES: CPT | Mod: HCNC,PN

## 2020-07-21 ENCOUNTER — PES CALL (OUTPATIENT)
Dept: ADMINISTRATIVE | Facility: CLINIC | Age: 75
End: 2020-07-21

## 2020-07-22 ENCOUNTER — OFFICE VISIT (OUTPATIENT)
Dept: OBSTETRICS AND GYNECOLOGY | Facility: CLINIC | Age: 75
End: 2020-07-22
Attending: OBSTETRICS & GYNECOLOGY
Payer: MEDICARE

## 2020-07-22 VITALS
WEIGHT: 145.5 LBS | HEIGHT: 65 IN | DIASTOLIC BLOOD PRESSURE: 60 MMHG | SYSTOLIC BLOOD PRESSURE: 110 MMHG | BODY MASS INDEX: 24.24 KG/M2

## 2020-07-22 DIAGNOSIS — N89.8 VAGINAL IRRITATION: Primary | ICD-10-CM

## 2020-07-22 DIAGNOSIS — N90.89 VULVAR IRRITATION: ICD-10-CM

## 2020-07-22 LAB
CANDIDA RRNA VAG QL PROBE: NEGATIVE
G VAGINALIS RRNA GENITAL QL PROBE: POSITIVE
T VAGINALIS RRNA GENITAL QL PROBE: NEGATIVE

## 2020-07-22 PROCEDURE — 1101F PT FALLS ASSESS-DOCD LE1/YR: CPT | Mod: HCNC,CPTII,S$GLB, | Performed by: OBSTETRICS & GYNECOLOGY

## 2020-07-22 PROCEDURE — 1159F MED LIST DOCD IN RCRD: CPT | Mod: HCNC,S$GLB,, | Performed by: OBSTETRICS & GYNECOLOGY

## 2020-07-22 PROCEDURE — 99999 PR PBB SHADOW E&M-EST. PATIENT-LVL III: CPT | Mod: PBBFAC,HCNC,, | Performed by: OBSTETRICS & GYNECOLOGY

## 2020-07-22 PROCEDURE — 99213 PR OFFICE/OUTPT VISIT, EST, LEVL III, 20-29 MIN: ICD-10-PCS | Mod: HCNC,S$GLB,, | Performed by: OBSTETRICS & GYNECOLOGY

## 2020-07-22 PROCEDURE — 1159F PR MEDICATION LIST DOCUMENTED IN MEDICAL RECORD: ICD-10-PCS | Mod: HCNC,S$GLB,, | Performed by: OBSTETRICS & GYNECOLOGY

## 2020-07-22 PROCEDURE — 87480 CANDIDA DNA DIR PROBE: CPT | Mod: HCNC

## 2020-07-22 PROCEDURE — 99999 PR PBB SHADOW E&M-EST. PATIENT-LVL III: ICD-10-PCS | Mod: PBBFAC,HCNC,, | Performed by: OBSTETRICS & GYNECOLOGY

## 2020-07-22 PROCEDURE — 1126F AMNT PAIN NOTED NONE PRSNT: CPT | Mod: HCNC,S$GLB,, | Performed by: OBSTETRICS & GYNECOLOGY

## 2020-07-22 PROCEDURE — 87510 GARDNER VAG DNA DIR PROBE: CPT | Mod: HCNC

## 2020-07-22 PROCEDURE — 1126F PR PAIN SEVERITY QUANTIFIED, NO PAIN PRESENT: ICD-10-PCS | Mod: HCNC,S$GLB,, | Performed by: OBSTETRICS & GYNECOLOGY

## 2020-07-22 PROCEDURE — 3008F BODY MASS INDEX DOCD: CPT | Mod: HCNC,CPTII,S$GLB, | Performed by: OBSTETRICS & GYNECOLOGY

## 2020-07-22 PROCEDURE — 3008F PR BODY MASS INDEX (BMI) DOCUMENTED: ICD-10-PCS | Mod: HCNC,CPTII,S$GLB, | Performed by: OBSTETRICS & GYNECOLOGY

## 2020-07-22 PROCEDURE — 99213 OFFICE O/P EST LOW 20 MIN: CPT | Mod: HCNC,S$GLB,, | Performed by: OBSTETRICS & GYNECOLOGY

## 2020-07-22 PROCEDURE — 1101F PR PT FALLS ASSESS DOC 0-1 FALLS W/OUT INJ PAST YR: ICD-10-PCS | Mod: HCNC,CPTII,S$GLB, | Performed by: OBSTETRICS & GYNECOLOGY

## 2020-07-22 RX ORDER — DESOXIMETASONE 2.5 MG/G
CREAM TOPICAL
COMMUNITY
Start: 2020-06-30 | End: 2021-08-12

## 2020-07-22 RX ORDER — METHYLPREDNISOLONE 4 MG/1
TABLET ORAL
COMMUNITY
Start: 2020-06-23 | End: 2020-11-30

## 2020-07-22 RX ORDER — FLUCONAZOLE 150 MG/1
150 TABLET ORAL ONCE
Qty: 1 TABLET | Refills: 0 | Status: SHIPPED | OUTPATIENT
Start: 2020-07-22 | End: 2020-07-22

## 2020-07-22 NOTE — PROGRESS NOTES
"  CC: Vulvar and vaginal irritation    Katiana Hagan is a 74 y.o. female  presents with complaint of above x 1 week. She first noticed the vulvar irritation after wearing a wet depends all day and then noticed her vagina was inflammed a few days afterward with some vaginal burning when urine touches the area. She has been putting desitin on the vulvar area and this has been helping. Uses premarin cream 3 times a week.    Past Medical History:   Diagnosis Date    Other and unspecified hyperlipidemia 10/29/2013     Past Surgical History:   Procedure Laterality Date    APPENDECTOMY  age 18    Breast augmentation  1988    COSMETIC SURGERY  2016    facelift    DILATION AND CURETTAGE OF UTERUS      MYOMECTOMY       Social History     Tobacco Use    Smoking status: Never Smoker    Smokeless tobacco: Never Used   Substance Use Topics    Alcohol use: Yes     Alcohol/week: 2.0 standard drinks     Types: 2 Glasses of wine per week     Comment: 1-2 drinks weekly - wine or rodrigue    Drug use: No     Family History   Problem Relation Age of Onset    Breast cancer Maternal Aunt     No Known Problems Father     No Known Problems Mother     Diabetes Sister     Schizophrenia Son     Heart disease Sister     No Known Problems Son     Colon cancer Neg Hx     Hypertension Neg Hx     Ovarian cancer Neg Hx     Stroke Neg Hx      OB History    Para Term  AB Living   2 2 2     2   SAB TAB Ectopic Multiple Live Births           2      # Outcome Date GA Lbr Curt/2nd Weight Sex Delivery Anes PTL Lv   2 Term      Vag-Spont  N BARBY   1 Term      Vag-Spont  N BARBY       /60   Ht 5' 5" (1.651 m)   Wt 66 kg (145 lb 8.1 oz)   LMP 1998 (Approximate)   BMI 24.21 kg/m²     ROS:  GENERAL: No fever, chills, fatigability or weight loss.  VULVAR: + pain, no lesions and no itching.  VAGINAL: No relaxation, no itching, no discharge, no abnormal bleeding and no lesions. + irritation  ABDOMEN: " No abdominal pain. Denies nausea. Denies vomiting. No diarrhea. No constipation  BREAST: Denies pain. No lumps. No discharge.  URINARY: No incontinence, no nocturia, no frequency and no dysuria.  CARDIOVASCULAR: No chest pain. No shortness of breath. No leg cramps.  NEUROLOGICAL: No headaches. No vision changes.    PHYSICAL EXAM:  GEN: NAD  Resp: nl effort  Abd: soft,NT  VULVA: erythema noted lateral left labia majora where depends edge sits, VAGINA: vaginal edema , vaginal erythema , CERVIX: normal appearing cervix without discharge or lesions  Psych: nl affect  Neuro: no focal deficits  Skin: warm and dry    ASSESSMENT and PLAN:    ICD-10-CM ICD-9-CM    1. Vaginal irritation  N89.8 623.9 Vaginosis Screen by DNA Probe     -Will treat for suspected VVC. Diflucan sent. Advised to continue desitin with every bathroom trip and use peribottle to clean vulvar area. Advised to change depends when wet. All questions answered.      FOLLOW UP: PRN lack of improvement.

## 2020-07-23 RX ORDER — METRONIDAZOLE 500 MG/1
500 TABLET ORAL EVERY 12 HOURS
Qty: 14 TABLET | Refills: 0 | Status: SHIPPED | OUTPATIENT
Start: 2020-07-23 | End: 2020-07-30

## 2020-10-20 ENCOUNTER — TELEPHONE (OUTPATIENT)
Dept: OBSTETRICS AND GYNECOLOGY | Facility: CLINIC | Age: 75
End: 2020-10-20

## 2020-10-20 DIAGNOSIS — Z12.31 SCREENING MAMMOGRAM, ENCOUNTER FOR: Primary | ICD-10-CM

## 2020-10-20 NOTE — TELEPHONE ENCOUNTER
----- Message from Jing Croreia sent at 10/20/2020 12:08 PM CDT -----  Regarding: Patient advice  Contact: pt  Pt called in regards to getting a Mammo screening       Pt can be reached at [phone]

## 2020-10-26 ENCOUNTER — PES CALL (OUTPATIENT)
Dept: ADMINISTRATIVE | Facility: CLINIC | Age: 75
End: 2020-10-26

## 2020-10-26 ENCOUNTER — HOSPITAL ENCOUNTER (OUTPATIENT)
Dept: RADIOLOGY | Facility: HOSPITAL | Age: 75
Discharge: HOME | End: 2020-10-26
Attending: OBSTETRICS & GYNECOLOGY
Payer: MEDICARE

## 2020-10-26 DIAGNOSIS — Z12.31 SCREENING MAMMOGRAM, ENCOUNTER FOR: ICD-10-CM

## 2020-10-26 PROCEDURE — 77067 SCR MAMMO BI INCL CAD: CPT | Mod: 26,HCNC,, | Performed by: RADIOLOGY

## 2020-10-26 PROCEDURE — 77063 BREAST TOMOSYNTHESIS BI: CPT | Mod: 26,HCNC,, | Performed by: RADIOLOGY

## 2020-10-26 PROCEDURE — 77067 SCR MAMMO BI INCL CAD: CPT | Mod: TC,HCNC

## 2020-10-26 PROCEDURE — 77067 MAMMO DIGITAL SCREENING BILAT WITH TOMO: ICD-10-PCS | Mod: 26,HCNC,, | Performed by: RADIOLOGY

## 2020-10-26 PROCEDURE — 77063 MAMMO DIGITAL SCREENING BILAT WITH TOMO: ICD-10-PCS | Mod: 26,HCNC,, | Performed by: RADIOLOGY

## 2020-11-04 ENCOUNTER — OFFICE VISIT (OUTPATIENT)
Dept: INTERNAL MEDICINE | Facility: CLINIC | Age: 75
End: 2020-11-04
Payer: MEDICARE

## 2020-11-04 VITALS
HEART RATE: 88 BPM | DIASTOLIC BLOOD PRESSURE: 84 MMHG | WEIGHT: 150.56 LBS | HEIGHT: 65 IN | SYSTOLIC BLOOD PRESSURE: 120 MMHG | BODY MASS INDEX: 25.08 KG/M2 | OXYGEN SATURATION: 98 %

## 2020-11-04 DIAGNOSIS — F41.9 ANXIETY: ICD-10-CM

## 2020-11-04 DIAGNOSIS — R41.3 MEMORY LOSS: ICD-10-CM

## 2020-11-04 DIAGNOSIS — Z78.0 POSTMENOPAUSAL: ICD-10-CM

## 2020-11-04 DIAGNOSIS — E78.5 HYPERLIPIDEMIA, UNSPECIFIED HYPERLIPIDEMIA TYPE: ICD-10-CM

## 2020-11-04 DIAGNOSIS — R09.81 NASAL CONGESTION: ICD-10-CM

## 2020-11-04 DIAGNOSIS — N39.41 URGE INCONTINENCE: ICD-10-CM

## 2020-11-04 DIAGNOSIS — E55.9 VITAMIN D DEFICIENCY: ICD-10-CM

## 2020-11-04 DIAGNOSIS — I87.2 VENOUS INSUFFICIENCY OF BOTH LOWER EXTREMITIES: ICD-10-CM

## 2020-11-04 DIAGNOSIS — Z00.00 ROUTINE PHYSICAL EXAMINATION: Primary | ICD-10-CM

## 2020-11-04 PROCEDURE — 99397 PER PM REEVAL EST PAT 65+ YR: CPT | Mod: HCNC,S$GLB,, | Performed by: INTERNAL MEDICINE

## 2020-11-04 PROCEDURE — 99999 PR PBB SHADOW E&M-EST. PATIENT-LVL V: ICD-10-PCS | Mod: PBBFAC,HCNC,, | Performed by: INTERNAL MEDICINE

## 2020-11-04 PROCEDURE — 99999 PR PBB SHADOW E&M-EST. PATIENT-LVL V: CPT | Mod: PBBFAC,HCNC,, | Performed by: INTERNAL MEDICINE

## 2020-11-04 PROCEDURE — 99397 PR PREVENTIVE VISIT,EST,65 & OVER: ICD-10-PCS | Mod: HCNC,S$GLB,, | Performed by: INTERNAL MEDICINE

## 2020-11-04 NOTE — PROGRESS NOTES
Subjective:       Patient ID: Katiana Hagan is a 74 y.o. female.    Chief Complaint: No chief complaint on file.    HPI:  Patient comes in for annual exam.  She has a few issues to discuss.  She still says she occasionally feels some soreness on the left side of the neck.  She had a soft tissue CT scan which did not show any abnormalities.  She also mentions that she feels she continues to have some memory issues.  Previously she saw Neurology and had an unremarkable evaluation for years ago but she would like to have a follow-up again.  We will update blood labs and some consultations.  She feels she occasionally has trouble breathing through her nose and would like to see ENT for that, her hearing and the left-sided neck symptoms.    Review of Systems   Constitutional: Negative for appetite change, chills and fever.   HENT: Positive for nasal congestion and postnasal drip. Negative for nosebleeds and sore throat.         Left-sided small area of neck swelling and pain possibly related to old face lift which had complications.   Eyes: Negative for visual disturbance.   Respiratory: Negative for cough, shortness of breath and wheezing.    Cardiovascular: Negative for chest pain and leg swelling.   Gastrointestinal: Negative for abdominal pain, constipation and diarrhea.   Genitourinary: Positive for bladder incontinence. Negative for difficulty urinating and hematuria.   Musculoskeletal: Negative for neck pain and neck stiffness.   Integumentary:  Negative for pallor and rash.   Neurological: Negative for headaches.   Psychiatric/Behavioral: Negative for dysphoric mood and suicidal ideas. The patient is not nervous/anxious.          Objective:      Physical Exam  Constitutional:       General: She is not in acute distress.     Appearance: She is well-developed.   HENT:      Head: Normocephalic and atraumatic.      Right Ear: Tympanic membrane, ear canal and external ear normal.      Left Ear: Tympanic membrane,  ear canal and external ear normal.      Nose: No rhinorrhea.      Mouth/Throat:      Pharynx: No oropharyngeal exudate.   Eyes:      General: No scleral icterus.     Conjunctiva/sclera: Conjunctivae normal.      Pupils: Pupils are equal, round, and reactive to light.   Neck:      Musculoskeletal: Normal range of motion and neck supple.      Thyroid: No thyromegaly.   Cardiovascular:      Rate and Rhythm: Normal rate and regular rhythm.      Heart sounds: No murmur.   Pulmonary:      Effort: Pulmonary effort is normal.      Breath sounds: Normal breath sounds. No wheezing.   Abdominal:      General: Bowel sounds are normal. There is no distension.      Palpations: Abdomen is soft.      Tenderness: There is no abdominal tenderness.   Musculoskeletal:         General: No tenderness.      Right lower leg: No edema.      Left lower leg: No edema.   Lymphadenopathy:      Cervical: Cervical adenopathy (Probable small mobile unremarkable left-sided cervical lymph node.  I could not duplicate the tenderness.) present.   Skin:     Findings: No rash.   Neurological:      Mental Status: She is alert and oriented to person, place, and time.      Sensory: No sensory deficit.      Coordination: Coordination normal.      Deep Tendon Reflexes: Reflexes normal.   Psychiatric:         Mood and Affect: Mood normal.      Comments: Somewhat anxious appearing female         Assessment:       1. Routine physical examination    2. Hyperlipidemia, unspecified hyperlipidemia type    3. Anxiety    4. Vitamin D deficiency    5. Venous insufficiency of both lower extremities    6. Urge incontinence        Plan:       Katiana was seen today for annual exam.    Diagnoses and all orders for this visit:    Routine physical examination  -     CBC Auto Differential; Future  -     Comprehensive Metabolic Panel; Future  -     TSH; Future  -     Lipid Panel; Future    Hyperlipidemia, unspecified hyperlipidemia type  -     CBC Auto Differential; Future  -      Comprehensive Metabolic Panel; Future  -     TSH; Future  -     Lipid Panel; Future    Anxiety  -     CBC Auto Differential; Future  -     Comprehensive Metabolic Panel; Future  -     TSH; Future  -     Lipid Panel; Future    Vitamin D deficiency  -     CBC Auto Differential; Future  -     Comprehensive Metabolic Panel; Future  -     TSH; Future  -     Lipid Panel; Future    Venous insufficiency of both lower extremities  -     CBC Auto Differential; Future  -     Comprehensive Metabolic Panel; Future  -     TSH; Future  -     Lipid Panel; Future    Urge incontinence  -     CBC Auto Differential; Future  -     Comprehensive Metabolic Panel; Future  -     TSH; Future  -     Lipid Panel; Future    Memory loss  -     Ambulatory referral/consult to Neurology; Future    Postmenopausal  -     DXA Bone Density Spine And Hip; Future    Nasal congestion  -     Ambulatory referral/consult to ENT; Future        review consult.  Continue healthy diet exercise mental and physical activities.

## 2020-11-05 ENCOUNTER — CLINICAL SUPPORT (OUTPATIENT)
Dept: AUDIOLOGY | Facility: CLINIC | Age: 75
End: 2020-11-05
Payer: MEDICARE

## 2020-11-05 ENCOUNTER — OFFICE VISIT (OUTPATIENT)
Dept: OTOLARYNGOLOGY | Facility: CLINIC | Age: 75
End: 2020-11-05
Payer: MEDICARE

## 2020-11-05 DIAGNOSIS — R09.81 NASAL CONGESTION: ICD-10-CM

## 2020-11-05 DIAGNOSIS — H93.13 TINNITUS, BILATERAL: ICD-10-CM

## 2020-11-05 DIAGNOSIS — H90.3 SENSORINEURAL HEARING LOSS, BILATERAL: Primary | ICD-10-CM

## 2020-11-05 DIAGNOSIS — H90.3 SENSORINEURAL HEARING LOSS (SNHL) OF BOTH EARS: Primary | ICD-10-CM

## 2020-11-05 PROCEDURE — 1101F PR PT FALLS ASSESS DOC 0-1 FALLS W/OUT INJ PAST YR: ICD-10-PCS | Mod: HCNC,CPTII,S$GLB, | Performed by: NURSE PRACTITIONER

## 2020-11-05 PROCEDURE — 99999 PR PBB SHADOW E&M-EST. PATIENT-LVL II: CPT | Mod: PBBFAC,HCNC,, | Performed by: NURSE PRACTITIONER

## 2020-11-05 PROCEDURE — 92567 TYMPANOMETRY: CPT | Mod: HCNC,S$GLB,, | Performed by: AUDIOLOGIST

## 2020-11-05 PROCEDURE — 92557 COMPREHENSIVE HEARING TEST: CPT | Mod: HCNC,S$GLB,, | Performed by: AUDIOLOGIST

## 2020-11-05 PROCEDURE — 1159F MED LIST DOCD IN RCRD: CPT | Mod: HCNC,S$GLB,, | Performed by: NURSE PRACTITIONER

## 2020-11-05 PROCEDURE — 99203 OFFICE O/P NEW LOW 30 MIN: CPT | Mod: HCNC,S$GLB,, | Performed by: NURSE PRACTITIONER

## 2020-11-05 PROCEDURE — 1159F PR MEDICATION LIST DOCUMENTED IN MEDICAL RECORD: ICD-10-PCS | Mod: HCNC,S$GLB,, | Performed by: NURSE PRACTITIONER

## 2020-11-05 PROCEDURE — 92557 PR COMPREHENSIVE HEARING TEST: ICD-10-PCS | Mod: HCNC,S$GLB,, | Performed by: AUDIOLOGIST

## 2020-11-05 PROCEDURE — 99999 PR PBB SHADOW E&M-EST. PATIENT-LVL II: ICD-10-PCS | Mod: PBBFAC,HCNC,, | Performed by: NURSE PRACTITIONER

## 2020-11-05 PROCEDURE — 99203 PR OFFICE/OUTPT VISIT, NEW, LEVL III, 30-44 MIN: ICD-10-PCS | Mod: HCNC,S$GLB,, | Performed by: NURSE PRACTITIONER

## 2020-11-05 PROCEDURE — 1101F PT FALLS ASSESS-DOCD LE1/YR: CPT | Mod: HCNC,CPTII,S$GLB, | Performed by: NURSE PRACTITIONER

## 2020-11-05 PROCEDURE — 92567 PR TYMPA2METRY: ICD-10-PCS | Mod: HCNC,S$GLB,, | Performed by: AUDIOLOGIST

## 2020-11-05 NOTE — LETTER
November 5, 2020      Tucker Rne MD  [address]  [facility] [address]           [facility]  [address]  [facility] [address]  Phone: [phone]  Fax: [phone]          Patient: Katiana Hagan   MR Number: [id number]   YOB: 1945   Date of Visit: 11/5/2020       Dear Dr. Tucker Ren:    Thank you for referring Katiana Hagan to me for evaluation. Attached you will find relevant portions of my assessment and plan of care.    If you have questions, please do not hesitate to call me. I look forward to following Katiana Hagan along with you.    Sincerely,    Jacqueline Rosales, NP    Enclosure  CC:  No Recipients    If you would like to receive this communication electronically, please contact [email or web address] or [phone] to request more information on [facility] access.    For providers and/or their staff who would like to refer a patient to [facility], please contact us through our one-stop-shop provider referral line, [facility], at [phone].    If you feel you have received this communication in error or would no longer like to receive these types of communications, please e-mail [email or web address]

## 2020-11-05 NOTE — PROGRESS NOTES
Subjective:      Katiana Hagan is a 74 y.o. female who was referred to me by Dr. Tucker Ren in consultation for hearing loss.    Ms. Hagan reports having difficulty with hearing in both ears for several years. She states family have mentioned concern that patient has trouble with hearing. She reports intermittent, non-bothersome tinnitus. She denies ear pain, ear drainage or dizziness.  There is not a family history of hearing loss at a young age.  There is not a prior history of ear surgery.  There is not a prior history of ear infections .  She denies a history of significant noise exposure.  She does not wear hearing aids currently.  She has not had a hearing test recently.        Past Medical History  She has a past medical history of Other and unspecified hyperlipidemia.    Past Surgical History  She has a past surgical history that includes Dilation and curettage of uterus; Myomectomy; Appendectomy (age 18); Breast augmentation (1988); and Cosmetic surgery (03/08/2016).    Family History  Her family history includes Breast cancer in her maternal aunt; Diabetes in her sister; Heart disease in her sister; No Known Problems in her father, mother, and son; Schizophrenia in her son.    Social History  She reports that she has never smoked. She has never used smokeless tobacco. She reports current alcohol use of about 2.0 standard drinks of alcohol per week. She reports that she does not use drugs.    Allergies  She is allergic to acrylates/beheneth-25 methacrylate copolymer; hydrocortisone; methylprednisolone aceponate; prednisone; and tixocortol pivalate.    Medications  She has a current medication list which includes the following prescription(s): calcium-vitamin d3-vitamin k, cartilage/collagen/bor/hyalur, cetirizine, chol/gl/ser/rna/phen/prg/hb150, cholecalciferol (vitamin d3), citalopram, cyclobenzaprine, desoximetasone, lactobacillus acidophilus, methylprednisolone,  mv-mn/c/glutamin/lysin/hsqh283, NON FORMULARY MEDICATION, NON FORMULARY MEDICATION, oxybutynin, premarin, and turmeric.    Review of Systems   Constitutional: Negative for chills, fever and unexpected weight change.   HENT: Positive for hearing loss and tinnitus. Negative for congestion, ear discharge, ear pain, facial swelling, postnasal drip, sinus pressure, sore throat and trouble swallowing.    Eyes: Negative for pain and visual disturbance.   Respiratory: Negative for apnea and shortness of breath.    Cardiovascular: Negative for chest pain and palpitations.   Gastrointestinal: Negative for abdominal pain and nausea.   Endocrine: Negative for cold intolerance and heat intolerance.   Musculoskeletal: Negative for joint swelling and neck stiffness.   Skin: Negative for color change and rash.   Neurological: Negative for dizziness, facial asymmetry and headaches.   Hematological: Negative for adenopathy. Does not bruise/bleed easily.   Psychiatric/Behavioral: Negative for agitation. The patient is not nervous/anxious.           Objective:     LMP 09/24/1998 (Approximate)      Constitutional:   Vital signs are normal. She appears well-developed and well-nourished.     Head:  Normocephalic and atraumatic.     Ears:    Right Ear: No lacerations. No drainage, swelling or tenderness. No foreign bodies. No mastoid tenderness. Tympanic membrane is not injected, not scarred, not perforated, not erythematous, not retracted and not bulging. Tympanic membrane mobility is normal. No middle ear effusion. No hemotympanum. Decreased hearing is noted.   Left Ear: No lacerations. No drainage, swelling or tenderness. No foreign bodies. No mastoid tenderness. Tympanic membrane is not injected, not scarred, not perforated, not erythematous, not retracted and not bulging. Tympanic membrane mobility is normal.  No middle ear effusion. No hemotympanum. Decreased hearing is noted.     Nose:  Nose normal including turbinates, nasal  mucosa, sinuses and nasal septum.     Mouth/Throat  Lips, teeth, and gums normal and oropharynx normal.     Neck:  Neck normal without thyromegaly masses, asymmetry, normal tracheal structure, crepitus, and tenderness and no adenopathy.     Psychiatric:   She has a normal mood and affect.       Procedure    None        Data Reviewed    WBC (K/uL)   Date Value   11/04/2020 6.78     Platelets (K/uL)   Date Value   11/04/2020 218      Creatinine (mg/dL)   Date Value   11/04/2020 1.0     TSH (uIU/mL)   Date Value   11/04/2020 0.900     Glucose (mg/dL)   Date Value   11/04/2020 84     No results found for: HGBA1C       I independently reviewed the tracings of the complete audiometric evaluation performed today.  I reviewed the audiogram with the patient as well.  Pertinent findings include bilateral mild to moderate SNHL (250-8000Hz). SRT 30 with 100% discrimination at 70db. Type A tympanogram in both ears..    Assessment:     1. Sensorineural hearing loss (SNHL) of both ears    2. Tinnitus, bilateral    3. Nasal congestion         Plan:     I had a long discussion with the patient regarding her condition and the further workup and management options.    Diagnoses and all orders for this visit:    Sensorineural hearing loss (SNHL) of both ears    Tinnitus, bilateral        -      Audiogram Reviewed: Bilateral SNHL.  Hearing conservation strongly recommended.  Trial of amplification bilaterally also recommended. Kate Garces's card provided to patient.    Re-check of hearing in 1 year or sooner if subjective change noted.    Nasal congestion  -     Ambulatory referral/consult to ENT    - I recommend fluticasone 1-2 sprays each nostril daily for 2 weeks, then as needed for nasal congestion.    Follow up in about 1 year (around 11/5/2021).

## 2020-11-05 NOTE — PROGRESS NOTES
Katiana Hagan was seen today in the clinic for an audiologic evaluation.  Patients main complaint was hearing loss.  Mrs. Hagan reported she has to turn the TV louder to hear it well, and her sister complains it is too loud.  She denied having to ask people to repeat in conversation and denied any tinnitus.    Tympanometry revealed Type A in the right ear and Type A in the left ear. Audiogram results revealed a mild to moderate SNHL in the right and left ear.  Speech reception thresholds were noted at 30 dB in the right ear and 30 dB in the left ear.  Speech discrimination scores were 100% in the right ear and 100% in the left ear.    Recommendations:  1. Otologic evaluation  2. Annual audiogram  3. Noise protection when in noise  4. Hearing aid consultation

## 2020-11-09 ENCOUNTER — PES CALL (OUTPATIENT)
Dept: ADMINISTRATIVE | Facility: CLINIC | Age: 75
End: 2020-11-09

## 2020-11-12 ENCOUNTER — PATIENT MESSAGE (OUTPATIENT)
Dept: INTERNAL MEDICINE | Facility: CLINIC | Age: 75
End: 2020-11-12

## 2020-11-30 ENCOUNTER — OFFICE VISIT (OUTPATIENT)
Dept: INTERNAL MEDICINE | Facility: CLINIC | Age: 75
End: 2020-11-30
Payer: MEDICARE

## 2020-11-30 ENCOUNTER — TELEPHONE (OUTPATIENT)
Dept: INTERNAL MEDICINE | Facility: CLINIC | Age: 75
End: 2020-11-30

## 2020-11-30 ENCOUNTER — HOSPITAL ENCOUNTER (OUTPATIENT)
Dept: RADIOLOGY | Facility: CLINIC | Age: 75
Discharge: HOME | End: 2020-11-30
Attending: INTERNAL MEDICINE
Payer: MEDICARE

## 2020-11-30 VITALS
HEART RATE: 78 BPM | HEIGHT: 66 IN | SYSTOLIC BLOOD PRESSURE: 122 MMHG | DIASTOLIC BLOOD PRESSURE: 78 MMHG | WEIGHT: 151.88 LBS | BODY MASS INDEX: 24.41 KG/M2 | OXYGEN SATURATION: 96 %

## 2020-11-30 DIAGNOSIS — Z23 NEED FOR SHINGLES VACCINE: ICD-10-CM

## 2020-11-30 DIAGNOSIS — E78.2 MIXED HYPERLIPIDEMIA: ICD-10-CM

## 2020-11-30 DIAGNOSIS — I87.2 VENOUS INSUFFICIENCY OF BOTH LOWER EXTREMITIES: ICD-10-CM

## 2020-11-30 DIAGNOSIS — N39.41 URGE INCONTINENCE: ICD-10-CM

## 2020-11-30 DIAGNOSIS — Z74.09 DECREASED MOBILITY AND ENDURANCE: ICD-10-CM

## 2020-11-30 DIAGNOSIS — Z00.00 ENCOUNTER FOR PREVENTIVE HEALTH EXAMINATION: Primary | ICD-10-CM

## 2020-11-30 DIAGNOSIS — R29.3 POOR POSTURE: ICD-10-CM

## 2020-11-30 DIAGNOSIS — Z78.0 POSTMENOPAUSAL: ICD-10-CM

## 2020-11-30 DIAGNOSIS — Z23 NEED FOR INFLUENZA VACCINATION: ICD-10-CM

## 2020-11-30 DIAGNOSIS — R29.898 DECREASED STRENGTH OF TRUNK AND BACK: ICD-10-CM

## 2020-11-30 PROBLEM — F32.A DEPRESSION: Status: ACTIVE | Noted: 2020-11-30

## 2020-11-30 PROCEDURE — G0439 PR MEDICARE ANNUAL WELLNESS SUBSEQUENT VISIT: ICD-10-PCS | Mod: HCNC,S$GLB,, | Performed by: NURSE PRACTITIONER

## 2020-11-30 PROCEDURE — 1158F PR ADVANCE CARE PLANNING DISCUSS DOCUMENTED IN MEDICAL RECORD: ICD-10-PCS | Mod: HCNC,S$GLB,, | Performed by: NURSE PRACTITIONER

## 2020-11-30 PROCEDURE — 77080 DEXA BONE DENSITY SPINE HIP: ICD-10-PCS | Mod: 26,HCNC,, | Performed by: INTERNAL MEDICINE

## 2020-11-30 PROCEDURE — 3288F FALL RISK ASSESSMENT DOCD: CPT | Mod: HCNC,CPTII,S$GLB, | Performed by: NURSE PRACTITIONER

## 2020-11-30 PROCEDURE — 1158F ADVNC CARE PLAN TLK DOCD: CPT | Mod: HCNC,S$GLB,, | Performed by: NURSE PRACTITIONER

## 2020-11-30 PROCEDURE — 99999 PR PBB SHADOW E&M-EST. PATIENT-LVL IV: ICD-10-PCS | Mod: PBBFAC,HCNC,, | Performed by: NURSE PRACTITIONER

## 2020-11-30 PROCEDURE — G0439 PPPS, SUBSEQ VISIT: HCPCS | Mod: HCNC,S$GLB,, | Performed by: NURSE PRACTITIONER

## 2020-11-30 PROCEDURE — 3008F BODY MASS INDEX DOCD: CPT | Mod: HCNC,CPTII,S$GLB, | Performed by: NURSE PRACTITIONER

## 2020-11-30 PROCEDURE — 77080 DXA BONE DENSITY AXIAL: CPT | Mod: TC,HCNC

## 2020-11-30 PROCEDURE — 1101F PT FALLS ASSESS-DOCD LE1/YR: CPT | Mod: HCNC,CPTII,S$GLB, | Performed by: NURSE PRACTITIONER

## 2020-11-30 PROCEDURE — 77080 DXA BONE DENSITY AXIAL: CPT | Mod: 26,HCNC,, | Performed by: INTERNAL MEDICINE

## 2020-11-30 PROCEDURE — 99999 PR PBB SHADOW E&M-EST. PATIENT-LVL IV: CPT | Mod: PBBFAC,HCNC,, | Performed by: NURSE PRACTITIONER

## 2020-11-30 PROCEDURE — 3288F PR FALLS RISK ASSESSMENT DOCUMENTED: ICD-10-PCS | Mod: HCNC,CPTII,S$GLB, | Performed by: NURSE PRACTITIONER

## 2020-11-30 PROCEDURE — 1126F PR PAIN SEVERITY QUANTIFIED, NO PAIN PRESENT: ICD-10-PCS | Mod: HCNC,S$GLB,, | Performed by: NURSE PRACTITIONER

## 2020-11-30 PROCEDURE — 1126F AMNT PAIN NOTED NONE PRSNT: CPT | Mod: HCNC,S$GLB,, | Performed by: NURSE PRACTITIONER

## 2020-11-30 PROCEDURE — 1101F PR PT FALLS ASSESS DOC 0-1 FALLS W/OUT INJ PAST YR: ICD-10-PCS | Mod: HCNC,CPTII,S$GLB, | Performed by: NURSE PRACTITIONER

## 2020-11-30 PROCEDURE — 3008F PR BODY MASS INDEX (BMI) DOCUMENTED: ICD-10-PCS | Mod: HCNC,CPTII,S$GLB, | Performed by: NURSE PRACTITIONER

## 2020-11-30 NOTE — TELEPHONE ENCOUNTER
----- Message from Antonieta Mehta DNP sent at 11/30/2020 12:50 PM CST -----  HRA visit today--pt mentioned concerns about hair loss. She didn't mention this during her annual with you earlier this month and wanted me to inform you.

## 2020-11-30 NOTE — Clinical Note
HRA visit today--pt mentioned concerns about hair loss. She didn't mention this during her annual with you earlier this month and wanted me to inform you.

## 2020-11-30 NOTE — PROGRESS NOTES
"  Katiaan Hagan presented for a  Medicare AWV and comprehensive Health Risk Assessment today. The following components were reviewed and updated:    · Medical history  · Family History  · Social history  · Allergies and Current Medications  · Health Risk Assessment  · Health Maintenance  · Care Team     ** See Completed Assessments for Annual Wellness Visit within the encounter summary.**         The following assessments were completed:  · Living Situation  · CAGE  · Depression Screening  · Timed Get Up and Go  · Whisper Test  · Cognitive Function Screening        · Nutrition Screening  · ADL Screening  · PAQ Screening        Vitals:    11/30/20 1201   BP: 122/78   BP Location: Right arm   Patient Position: Sitting   BP Method: Medium (Manual)   Pulse: 78   SpO2: 96%   Weight: 68.9 kg (151 lb 14.4 oz)   Height: 5' 6" (1.676 m)     Body mass index is 24.52 kg/m².  Physical Exam  Vitals signs and nursing note reviewed.   Constitutional:       Appearance: Normal appearance. She is well-developed.   HENT:      Head: Normocephalic.      Right Ear: Hearing normal.      Left Ear: Hearing normal.      Nose: Nose normal.      Mouth/Throat:      Mouth: Mucous membranes are moist.      Pharynx: Oropharynx is clear.   Eyes:      General: Lids are normal. Lids are everted, no foreign bodies appreciated.      Conjunctiva/sclera: Conjunctivae normal.      Pupils: Pupils are equal, round, and reactive to light.   Neck:      Musculoskeletal: Full passive range of motion without pain, normal range of motion and neck supple.      Vascular: No carotid bruit or JVD.      Trachea: Trachea normal.   Cardiovascular:      Rate and Rhythm: Normal rate and regular rhythm.      Pulses: Normal pulses.      Heart sounds: Normal heart sounds, S1 normal and S2 normal.   Pulmonary:      Effort: Pulmonary effort is normal.      Breath sounds: Normal breath sounds.   Abdominal:      General: Abdomen is flat. Bowel sounds are normal.      " Palpations: Abdomen is soft.   Musculoskeletal: Normal range of motion.   Skin:     General: Skin is warm and dry.      Capillary Refill: Capillary refill takes less than 2 seconds.   Neurological:      General: No focal deficit present.      Mental Status: She is alert and oriented to person, place, and time.      Deep Tendon Reflexes: Reflexes are normal and symmetric.   Psychiatric:         Mood and Affect: Mood normal.         Speech: Speech normal.         Behavior: Behavior normal.         Thought Content: Thought content normal.         Judgment: Judgment normal.       Diagnoses and health risks identified today and associated recommendations/orders:    1. Encounter for preventive health examination  Exam done    Health Maintenance updated    Records reviewed    2. Mixed hyperlipidemia  Stable, followed by PCP    Continue cholesterol med, low fat diet, and exercise.    3. Venous insufficiency of both lower extremities  Chronic, followed by PCP    4. Decreased mobility and endurance  Stable, followed by PCP    5. Decreased strength of trunk and back  Stable, followed by PCP    6. Poor posture  Stable, followed by PCP    7. Urge incontinence  Chronic, followed by PCP    8. Need for influenza vaccination  Refused    9. Need for shingles vaccine  Wants to hold off for now    10. BMI 24.0-24.9, adult  BMI reviewed      Provided Katiana with a 5-10 year written screening schedule and personal prevention plan. Recommendations were developed using the USPSTF age appropriate recommendations. Education, counseling, and referrals were provided as needed. After Visit Summary printed and given to patient which includes a list of additional screenings\tests needed.    Follow up in about 1 year (around 11/30/2021) for for annual with PCP Dr. Ren or sooner as needed.    Antonieta Mehta DNP  I offered to discuss end of life issues, including information on how to make advance directives that the patient could use to name  someone who would make medical decisions on their behalf if they became too ill to make themselves.    ___Patient declined  _X_Patient is interested, I provided paper work and offered to discuss.

## 2020-11-30 NOTE — TELEPHONE ENCOUNTER
Patient saw Antonieta for HRA. She mention she was concerned with hair loss.  Can we ask her she has a dermatologist?  That may be the next step to help with hair loss

## 2020-11-30 NOTE — PATIENT INSTRUCTIONS
Counseling and Referral of Other Preventative  (Italic type indicates deductible and co-insurance are waived)    Patient Name: Katiana Hagan  Today's Date: 11/30/2020    Health Maintenance       Date Due Completion Date    Shingles Vaccine (1 of 2) 12/25/1997--refused for now Refused for now    Influenza Vaccine (1) 08/01/2020--refused 11/24/2014    DEXA SCAN Scheduled today already 9/19/2017    Colorectal Cancer Screening 06/17/2021 6/17/2020    Override on 9/6/2017: Done (Fit Kit ordered)    Mammogram 10/26/2022 10/26/2020    Lipid Panel 11/04/2025 11/4/2020    TETANUS VACCINE 06/10/2029 6/10/2019    Override on 9/6/2017: Declined        No orders of the defined types were placed in this encounter.    The following information is provided to all patients.  This information is to help you find resources for any of the problems found today that may be affecting your health:                Living healthy guide: www.[facility].[place].[facility]      Understanding Diabetes: www.diabetes.org      Eating healthy: www.cdc.gov/healthyweight      CDC home safety checklist: www.cdc.gov/steadi/patient.html      Agency on Aging: www.goea.[place].gov      Alcoholics anonymous (AA): www.aa.org      Physical Activity: www.selam.nih.gov/gv6fvbq      Tobacco use: www.quitwithusla.org

## 2020-12-09 ENCOUNTER — TELEPHONE (OUTPATIENT)
Dept: INTERNAL MEDICINE | Facility: CLINIC | Age: 75
End: 2020-12-09

## 2020-12-09 ENCOUNTER — PATIENT MESSAGE (OUTPATIENT)
Dept: INTERNAL MEDICINE | Facility: CLINIC | Age: 75
End: 2020-12-09

## 2020-12-09 NOTE — TELEPHONE ENCOUNTER
----- Message from Monserrat Mueller sent at 12/9/2020  9:22 AM CST -----  Contact: [phone]  Calling to get test results.  Name of test (lab, x-ray): bone density  Date of test:   Where was the test performed:   Comments:

## 2020-12-09 NOTE — TELEPHONE ENCOUNTER
Spoke to pt. She will think about starting the medication. She will call back or respond in the portal on what she would like to do.

## 2020-12-15 DIAGNOSIS — N95.2 POSTMENOPAUSAL ATROPHIC VAGINITIS: ICD-10-CM

## 2020-12-15 RX ORDER — CONJUGATED ESTROGENS 0.62 MG/G
CREAM VAGINAL
Qty: 30 G | Refills: 4 | Status: SHIPPED | OUTPATIENT
Start: 2020-12-15 | End: 2022-02-21 | Stop reason: SDUPTHER

## 2020-12-15 NOTE — TELEPHONE ENCOUNTER
----- Message from Alvaro Sepulveda sent at 12/15/2020  9:03 AM CST -----  Pt needs a refill on her [facility] # [phone]

## 2021-01-22 ENCOUNTER — PATIENT MESSAGE (OUTPATIENT)
Dept: ADMINISTRATIVE | Facility: OTHER | Age: 76
End: 2021-01-22

## 2021-02-09 ENCOUNTER — OFFICE VISIT (OUTPATIENT)
Dept: NEUROLOGY | Facility: CLINIC | Age: 76
End: 2021-02-09
Payer: MEDICARE

## 2021-02-09 ENCOUNTER — LAB VISIT (OUTPATIENT)
Dept: LAB | Facility: HOSPITAL | Age: 76
End: 2021-02-09
Attending: PSYCHIATRY & NEUROLOGY
Payer: MEDICARE

## 2021-02-09 VITALS
SYSTOLIC BLOOD PRESSURE: 126 MMHG | WEIGHT: 151.88 LBS | DIASTOLIC BLOOD PRESSURE: 83 MMHG | HEIGHT: 66 IN | HEART RATE: 82 BPM | BODY MASS INDEX: 24.41 KG/M2

## 2021-02-09 DIAGNOSIS — R41.3 MEMORY LOSS: ICD-10-CM

## 2021-02-09 LAB — VIT B12 SERPL-MCNC: 654 PG/ML (ref 210–950)

## 2021-02-09 PROCEDURE — 99999 PR PBB SHADOW E&M-EST. PATIENT-LVL V: CPT | Mod: PBBFAC,,, | Performed by: PSYCHIATRY & NEUROLOGY

## 2021-02-09 PROCEDURE — 1101F PT FALLS ASSESS-DOCD LE1/YR: CPT | Mod: CPTII,S$GLB,, | Performed by: PSYCHIATRY & NEUROLOGY

## 2021-02-09 PROCEDURE — 3008F BODY MASS INDEX DOCD: CPT | Mod: CPTII,S$GLB,, | Performed by: PSYCHIATRY & NEUROLOGY

## 2021-02-09 PROCEDURE — 3288F PR FALLS RISK ASSESSMENT DOCUMENTED: ICD-10-PCS | Mod: CPTII,S$GLB,, | Performed by: PSYCHIATRY & NEUROLOGY

## 2021-02-09 PROCEDURE — 84425 ASSAY OF VITAMIN B-1: CPT

## 2021-02-09 PROCEDURE — 3288F FALL RISK ASSESSMENT DOCD: CPT | Mod: CPTII,S$GLB,, | Performed by: PSYCHIATRY & NEUROLOGY

## 2021-02-09 PROCEDURE — 36415 COLL VENOUS BLD VENIPUNCTURE: CPT

## 2021-02-09 PROCEDURE — 99214 PR OFFICE/OUTPT VISIT, EST, LEVL IV, 30-39 MIN: ICD-10-PCS | Mod: S$GLB,,, | Performed by: PSYCHIATRY & NEUROLOGY

## 2021-02-09 PROCEDURE — 82607 VITAMIN B-12: CPT

## 2021-02-09 PROCEDURE — 1126F AMNT PAIN NOTED NONE PRSNT: CPT | Mod: S$GLB,,, | Performed by: PSYCHIATRY & NEUROLOGY

## 2021-02-09 PROCEDURE — 1159F PR MEDICATION LIST DOCUMENTED IN MEDICAL RECORD: ICD-10-PCS | Mod: S$GLB,,, | Performed by: PSYCHIATRY & NEUROLOGY

## 2021-02-09 PROCEDURE — 1159F MED LIST DOCD IN RCRD: CPT | Mod: S$GLB,,, | Performed by: PSYCHIATRY & NEUROLOGY

## 2021-02-09 PROCEDURE — 3008F PR BODY MASS INDEX (BMI) DOCUMENTED: ICD-10-PCS | Mod: CPTII,S$GLB,, | Performed by: PSYCHIATRY & NEUROLOGY

## 2021-02-09 PROCEDURE — 99999 PR PBB SHADOW E&M-EST. PATIENT-LVL V: ICD-10-PCS | Mod: PBBFAC,,, | Performed by: PSYCHIATRY & NEUROLOGY

## 2021-02-09 PROCEDURE — 1126F PR PAIN SEVERITY QUANTIFIED, NO PAIN PRESENT: ICD-10-PCS | Mod: S$GLB,,, | Performed by: PSYCHIATRY & NEUROLOGY

## 2021-02-09 PROCEDURE — 1101F PR PT FALLS ASSESS DOC 0-1 FALLS W/OUT INJ PAST YR: ICD-10-PCS | Mod: CPTII,S$GLB,, | Performed by: PSYCHIATRY & NEUROLOGY

## 2021-02-09 PROCEDURE — 99214 OFFICE O/P EST MOD 30 MIN: CPT | Mod: S$GLB,,, | Performed by: PSYCHIATRY & NEUROLOGY

## 2021-02-10 ENCOUNTER — IMMUNIZATION (OUTPATIENT)
Dept: PRIMARY CARE CLINIC | Facility: CLINIC | Age: 76
End: 2021-02-10
Payer: MEDICARE

## 2021-02-10 DIAGNOSIS — Z23 NEED FOR VACCINATION: Primary | ICD-10-CM

## 2021-02-10 PROCEDURE — 91300 PR SARS-COV- 2 COVID-19 VACCINE, NO PRSV, 30MCG/0.3ML, IM: CPT | Mod: PBBFAC | Performed by: INTERNAL MEDICINE

## 2021-02-10 PROCEDURE — 0001A PR IMMUNIZ ADMIN, SARS-COV-2 COVID-19 VACC, 30MCG/0.3ML, 1ST DOSE: CPT | Mod: PBBFAC | Performed by: INTERNAL MEDICINE

## 2021-02-10 RX ADMIN — RNA INGREDIENT BNT-162B2 0.3 ML: 0.23 INJECTION, SUSPENSION INTRAMUSCULAR at 02:02

## 2021-02-15 LAB — VIT B1 BLD-MCNC: 43 UG/L (ref 38–122)

## 2021-03-03 ENCOUNTER — IMMUNIZATION (OUTPATIENT)
Dept: PRIMARY CARE CLINIC | Facility: CLINIC | Age: 76
End: 2021-03-03
Payer: MEDICARE

## 2021-03-03 DIAGNOSIS — Z23 NEED FOR VACCINATION: Primary | ICD-10-CM

## 2021-03-03 PROCEDURE — 91300 PR SARS-COV- 2 COVID-19 VACCINE, NO PRSV, 30MCG/0.3ML, IM: ICD-10-PCS | Mod: S$GLB,,, | Performed by: INTERNAL MEDICINE

## 2021-03-03 PROCEDURE — 91300 PR SARS-COV- 2 COVID-19 VACCINE, NO PRSV, 30MCG/0.3ML, IM: CPT | Mod: S$GLB,,, | Performed by: INTERNAL MEDICINE

## 2021-03-03 PROCEDURE — 0002A PR IMMUNIZ ADMIN, SARS-COV-2 COVID-19 VACC, 30MCG/0.3ML, 2ND DOSE: CPT | Mod: CV19,S$GLB,, | Performed by: INTERNAL MEDICINE

## 2021-03-03 PROCEDURE — 0002A PR IMMUNIZ ADMIN, SARS-COV-2 COVID-19 VACC, 30MCG/0.3ML, 2ND DOSE: ICD-10-PCS | Mod: CV19,S$GLB,, | Performed by: INTERNAL MEDICINE

## 2021-03-03 RX ADMIN — Medication 0.3 ML: at 02:03

## 2021-03-15 ENCOUNTER — OFFICE VISIT (OUTPATIENT)
Dept: NEUROLOGY | Facility: CLINIC | Age: 76
End: 2021-03-15
Payer: MEDICARE

## 2021-03-15 DIAGNOSIS — R41.89 SIGNS AND SYMPTOMS INVOLVING COGNITION: ICD-10-CM

## 2021-03-15 DIAGNOSIS — F34.1 DYSTHYMIA: Primary | ICD-10-CM

## 2021-03-15 DIAGNOSIS — F41.9 ANXIETY: ICD-10-CM

## 2021-03-15 PROCEDURE — 98968 PR NONPHYSICIAN TELEPHONE ASSESSMENT 21-30 MIN: ICD-10-PCS | Mod: 95,,, | Performed by: PSYCHIATRY & NEUROLOGY

## 2021-03-15 PROCEDURE — 99499 NO LOS: ICD-10-PCS | Mod: 95,,, | Performed by: PSYCHIATRY & NEUROLOGY

## 2021-03-15 PROCEDURE — 99499 UNLISTED E&M SERVICE: CPT | Mod: 95,,, | Performed by: PSYCHIATRY & NEUROLOGY

## 2021-03-15 PROCEDURE — 98968 PH1 ASSMT&MGMT NQHP 21-30: CPT | Mod: 95,,, | Performed by: PSYCHIATRY & NEUROLOGY

## 2021-03-16 ENCOUNTER — TELEPHONE (OUTPATIENT)
Dept: NEUROLOGY | Facility: CLINIC | Age: 76
End: 2021-03-16

## 2021-03-22 ENCOUNTER — TELEPHONE (OUTPATIENT)
Dept: NEUROLOGY | Facility: CLINIC | Age: 76
End: 2021-03-22

## 2021-03-23 ENCOUNTER — OFFICE VISIT (OUTPATIENT)
Dept: NEUROLOGY | Facility: CLINIC | Age: 76
End: 2021-03-23
Payer: MEDICARE

## 2021-03-23 DIAGNOSIS — F41.9 ANXIETY: Primary | ICD-10-CM

## 2021-03-23 DIAGNOSIS — G47.9 SLEEP DISTURBANCE: ICD-10-CM

## 2021-03-23 DIAGNOSIS — R41.3 MEMORY LOSS: ICD-10-CM

## 2021-03-23 DIAGNOSIS — F34.1 DYSTHYMIA: ICD-10-CM

## 2021-03-23 PROCEDURE — 99499 UNLISTED E&M SERVICE: CPT | Mod: S$GLB,,, | Performed by: PSYCHIATRY & NEUROLOGY

## 2021-03-23 PROCEDURE — 96138 PSYCL/NRPSYC TECH 1ST: CPT | Mod: S$GLB,,, | Performed by: PSYCHIATRY & NEUROLOGY

## 2021-03-23 PROCEDURE — 96132 PR NEUROPSYCHOLOGIC TEST EVAL SVCS, 1ST HR: ICD-10-PCS | Mod: S$GLB,,, | Performed by: PSYCHIATRY & NEUROLOGY

## 2021-03-23 PROCEDURE — 96138 PR PSYCH/NEUROPSYCH TEST ADMIN/SCORING, BY TECH, 2+ TESTS, 1ST 30 MIN: ICD-10-PCS | Mod: S$GLB,,, | Performed by: PSYCHIATRY & NEUROLOGY

## 2021-03-23 PROCEDURE — 96139 PR PSYCH/NEUROPSYCH TEST ADMIN/SCORING, BY TECH, 2+ TESTS, EA ADDTL 30 MIN: ICD-10-PCS | Mod: S$GLB,,, | Performed by: PSYCHIATRY & NEUROLOGY

## 2021-03-23 PROCEDURE — 99499 NO LOS: ICD-10-PCS | Mod: S$GLB,,, | Performed by: PSYCHIATRY & NEUROLOGY

## 2021-03-23 PROCEDURE — 96132 NRPSYC TST EVAL PHYS/QHP 1ST: CPT | Mod: S$GLB,,, | Performed by: PSYCHIATRY & NEUROLOGY

## 2021-03-23 PROCEDURE — 96133 PR NEUROPSYCHOLOGIC TEST EVAL SVCS, EA ADDTL HR: ICD-10-PCS | Mod: S$GLB,,, | Performed by: PSYCHIATRY & NEUROLOGY

## 2021-03-23 PROCEDURE — 96139 PSYCL/NRPSYC TST TECH EA: CPT | Mod: S$GLB,,, | Performed by: PSYCHIATRY & NEUROLOGY

## 2021-03-23 PROCEDURE — 96133 NRPSYC TST EVAL PHYS/QHP EA: CPT | Mod: S$GLB,,, | Performed by: PSYCHIATRY & NEUROLOGY

## 2021-04-06 ENCOUNTER — OFFICE VISIT (OUTPATIENT)
Dept: NEUROLOGY | Facility: CLINIC | Age: 76
End: 2021-04-06
Payer: MEDICARE

## 2021-04-06 DIAGNOSIS — F34.1 DYSTHYMIA: ICD-10-CM

## 2021-04-06 DIAGNOSIS — F41.9 ANXIETY: Primary | ICD-10-CM

## 2021-04-06 PROCEDURE — 99999 PR PBB SHADOW E&M-EST. PATIENT-LVL II: ICD-10-PCS | Mod: PBBFAC,,, | Performed by: PSYCHIATRY & NEUROLOGY

## 2021-04-06 PROCEDURE — 99999 PR PBB SHADOW E&M-EST. PATIENT-LVL II: CPT | Mod: PBBFAC,,, | Performed by: PSYCHIATRY & NEUROLOGY

## 2021-04-06 PROCEDURE — 99499 UNLISTED E&M SERVICE: CPT | Mod: S$GLB,,, | Performed by: PSYCHIATRY & NEUROLOGY

## 2021-04-06 PROCEDURE — 99499 NO LOS: ICD-10-PCS | Mod: S$GLB,,, | Performed by: PSYCHIATRY & NEUROLOGY

## 2021-04-22 NOTE — PROGRESS NOTES
"Katiana Hagan presented for a  Medicare AWV and comprehensive Health Risk Assessment today. The following components were reviewed and updated:    · Medical history  · Family History  · Social history  · Allergies and Current Medications  · Health Risk Assessment  · Health Maintenance  · Care Team     ** See Completed Assessments for Annual Wellness Visit within the encounter summary.**       The following assessments were completed:  · Living Situation  · CAGE  · Depression Screening  · Timed Get Up and Go  · Whisper Test  · Cognitive Function Screening  ·   ·   ·   · Nutrition Screening  · ADL Screening  · PAQ Screening    Vitals:    09/06/17 1309   BP: 108/76   BP Location: Left arm   Pulse: 66   Weight: 66.1 kg (145 lb 11.6 oz)   Height: 5' 6" (1.676 m)     Body mass index is 23.52 kg/m².  Physical Exam   Constitutional: She is oriented to person, place, and time. She appears well-developed and well-nourished.   HENT:   Head: Normocephalic.   Cardiovascular: Normal rate and regular rhythm.    Pulmonary/Chest: Effort normal and breath sounds normal.   Abdominal: Soft. Bowel sounds are normal.   Musculoskeletal: Normal range of motion. She exhibits no edema.   Neurological: She is alert and oriented to person, place, and time.   Skin: Skin is warm and dry.   Psychiatric: She has a normal mood and affect.   Nursing note and vitals reviewed.        Diagnoses and health risks identified today and associated recommendations/orders:    1. Encounter for preventive health examination  Here for Health Risk Assessment/AWV. Follow up in one year.    2. Encounter for FIT (fecal immunochemical test) screening  - Fecal Immunochemical Test (iFOBT); Future    3. Screening mammogram for high-risk patient  - Mammo Digital Screening Bilateral With CAD; Future    4. Immunization due  - (In Office Administered) Pneumococcal Conjugate Vaccine (13 Valent) (IM)    5. Urge incontinence  Chronic, stable. Followed by OB Gyn.    6. " Hyperlipidemia, unspecified hyperlipidemia type  Chronic, stable with diet control. Followed by PCP.    7. Vitamin D deficiency  Chronic, stable on current medication. Followed by PCP.    8. Venous insufficiency of both lower extremities  Chronic, stable. Followed by PCP.      Provided Katiana with a 5-10 year written screening schedule and personal prevention plan. Recommendations were developed using the USPSTF age appropriate recommendations. Education, counseling, and referrals were provided as needed. After Visit Summary printed and given to patient which includes a list of additional screenings\tests needed.    Follow up PCP: 9/06/2017    Payal Godfrey NP   [employer] Counseling:  I discussed with the patient the risks of mycophenolate mofetil including but not limited to infection/immunosuppression, GI upset, hypokalemia, hypercholesterolemia, bone marrow suppression, lymphoproliferative disorders, malignancy, GI ulceration/bleed/perforation, colitis, interstitial lung disease, kidney failure, progressive multifocal leukoencephalopathy, and birth defects.  The patient understands that monitoring is required including a baseline creatinine and regular CBC testing. In addition, patient must alert us immediately if symptoms of infection or other concerning signs are noted.

## 2021-06-25 ENCOUNTER — PES CALL (OUTPATIENT)
Dept: ADMINISTRATIVE | Facility: CLINIC | Age: 76
End: 2021-06-25

## 2021-08-09 ENCOUNTER — TELEPHONE (OUTPATIENT)
Dept: INTERNAL MEDICINE | Facility: CLINIC | Age: 76
End: 2021-08-09

## 2021-08-12 ENCOUNTER — LAB VISIT (OUTPATIENT)
Dept: LAB | Facility: HOSPITAL | Age: 76
End: 2021-08-12
Attending: INTERNAL MEDICINE
Payer: MEDICARE

## 2021-08-12 ENCOUNTER — OFFICE VISIT (OUTPATIENT)
Dept: INTERNAL MEDICINE | Facility: CLINIC | Age: 76
End: 2021-08-12
Payer: MEDICARE

## 2021-08-12 ENCOUNTER — OFFICE VISIT (OUTPATIENT)
Dept: PSYCHIATRY | Facility: CLINIC | Age: 76
End: 2021-08-12
Payer: MEDICARE

## 2021-08-12 VITALS
SYSTOLIC BLOOD PRESSURE: 135 MMHG | HEIGHT: 66 IN | BODY MASS INDEX: 25.51 KG/M2 | DIASTOLIC BLOOD PRESSURE: 85 MMHG | WEIGHT: 158.75 LBS

## 2021-08-12 DIAGNOSIS — E55.9 VITAMIN D DEFICIENCY DISEASE: ICD-10-CM

## 2021-08-12 DIAGNOSIS — N28.9 RENAL INSUFFICIENCY: ICD-10-CM

## 2021-08-12 DIAGNOSIS — E61.1 LOW IRON: ICD-10-CM

## 2021-08-12 DIAGNOSIS — I87.2 VENOUS INSUFFICIENCY OF BOTH LOWER EXTREMITIES: ICD-10-CM

## 2021-08-12 DIAGNOSIS — R53.83 FATIGUE, UNSPECIFIED TYPE: ICD-10-CM

## 2021-08-12 DIAGNOSIS — F41.9 ANXIETY: Primary | ICD-10-CM

## 2021-08-12 DIAGNOSIS — E53.8 B12 DEFICIENCY: ICD-10-CM

## 2021-08-12 DIAGNOSIS — I87.2 VENOUS INSUFFICIENCY OF BOTH LOWER EXTREMITIES: Primary | ICD-10-CM

## 2021-08-12 LAB
25(OH)D3+25(OH)D2 SERPL-MCNC: 55 NG/ML (ref 30–96)
BASOPHILS # BLD AUTO: 0.06 K/UL (ref 0–0.2)
BASOPHILS NFR BLD: 1 % (ref 0–1.9)
BNP SERPL-MCNC: 40 PG/ML (ref 0–99)
DIFFERENTIAL METHOD: NORMAL
EOSINOPHIL # BLD AUTO: 0.1 K/UL (ref 0–0.5)
EOSINOPHIL NFR BLD: 1.6 % (ref 0–8)
ERYTHROCYTE [DISTWIDTH] IN BLOOD BY AUTOMATED COUNT: 13.3 % (ref 11.5–14.5)
FERRITIN SERPL-MCNC: 105 NG/ML (ref 20–300)
HCT VFR BLD AUTO: 46.8 % (ref 37–48.5)
HGB BLD-MCNC: 15 G/DL (ref 12–16)
IMM GRANULOCYTES # BLD AUTO: 0.02 K/UL (ref 0–0.04)
IMM GRANULOCYTES NFR BLD AUTO: 0.3 % (ref 0–0.5)
LYMPHOCYTES # BLD AUTO: 1.6 K/UL (ref 1–4.8)
LYMPHOCYTES NFR BLD: 25.7 % (ref 18–48)
MCH RBC QN AUTO: 28.1 PG (ref 27–31)
MCHC RBC AUTO-ENTMCNC: 32.1 G/DL (ref 32–36)
MCV RBC AUTO: 88 FL (ref 82–98)
MONOCYTES # BLD AUTO: 0.8 K/UL (ref 0.3–1)
MONOCYTES NFR BLD: 12.8 % (ref 4–15)
NEUTROPHILS # BLD AUTO: 3.6 K/UL (ref 1.8–7.7)
NEUTROPHILS NFR BLD: 58.6 % (ref 38–73)
NRBC BLD-RTO: 0 /100 WBC
PLATELET # BLD AUTO: 230 K/UL (ref 150–450)
PMV BLD AUTO: 11.9 FL (ref 9.2–12.9)
RBC # BLD AUTO: 5.34 M/UL (ref 4–5.4)
TSH SERPL DL<=0.005 MIU/L-ACNC: 1.05 UIU/ML (ref 0.4–4)
VIT B12 SERPL-MCNC: 539 PG/ML (ref 210–950)
WBC # BLD AUTO: 6.19 K/UL (ref 3.9–12.7)

## 2021-08-12 PROCEDURE — 3288F FALL RISK ASSESSMENT DOCD: CPT | Mod: CPTII,S$GLB,, | Performed by: INTERNAL MEDICINE

## 2021-08-12 PROCEDURE — 82306 VITAMIN D 25 HYDROXY: CPT | Performed by: INTERNAL MEDICINE

## 2021-08-12 PROCEDURE — 99999 PR PBB SHADOW E&M-EST. PATIENT-LVL I: CPT | Mod: PBBFAC,,, | Performed by: SOCIAL WORKER

## 2021-08-12 PROCEDURE — 99999 PR PBB SHADOW E&M-EST. PATIENT-LVL I: ICD-10-PCS | Mod: PBBFAC,,, | Performed by: SOCIAL WORKER

## 2021-08-12 PROCEDURE — 1126F AMNT PAIN NOTED NONE PRSNT: CPT | Mod: CPTII,S$GLB,, | Performed by: INTERNAL MEDICINE

## 2021-08-12 PROCEDURE — 3008F PR BODY MASS INDEX (BMI) DOCUMENTED: ICD-10-PCS | Mod: CPTII,S$GLB,, | Performed by: INTERNAL MEDICINE

## 2021-08-12 PROCEDURE — 99214 OFFICE O/P EST MOD 30 MIN: CPT | Mod: S$GLB,,, | Performed by: INTERNAL MEDICINE

## 2021-08-12 PROCEDURE — 99214 PR OFFICE/OUTPT VISIT, EST, LEVL IV, 30-39 MIN: ICD-10-PCS | Mod: S$GLB,,, | Performed by: INTERNAL MEDICINE

## 2021-08-12 PROCEDURE — 1159F PR MEDICATION LIST DOCUMENTED IN MEDICAL RECORD: ICD-10-PCS | Mod: CPTII,S$GLB,, | Performed by: SOCIAL WORKER

## 2021-08-12 PROCEDURE — 3079F DIAST BP 80-89 MM HG: CPT | Mod: CPTII,S$GLB,, | Performed by: INTERNAL MEDICINE

## 2021-08-12 PROCEDURE — 85025 COMPLETE CBC W/AUTO DIFF WBC: CPT | Performed by: INTERNAL MEDICINE

## 2021-08-12 PROCEDURE — 82728 ASSAY OF FERRITIN: CPT | Performed by: INTERNAL MEDICINE

## 2021-08-12 PROCEDURE — 90791 PR PSYCHIATRIC DIAGNOSTIC EVALUATION: ICD-10-PCS | Mod: S$GLB,,, | Performed by: SOCIAL WORKER

## 2021-08-12 PROCEDURE — 36415 COLL VENOUS BLD VENIPUNCTURE: CPT | Performed by: INTERNAL MEDICINE

## 2021-08-12 PROCEDURE — 82607 VITAMIN B-12: CPT | Performed by: INTERNAL MEDICINE

## 2021-08-12 PROCEDURE — 99999 PR PBB SHADOW E&M-EST. PATIENT-LVL III: CPT | Mod: PBBFAC,,, | Performed by: INTERNAL MEDICINE

## 2021-08-12 PROCEDURE — 1159F MED LIST DOCD IN RCRD: CPT | Mod: CPTII,S$GLB,, | Performed by: SOCIAL WORKER

## 2021-08-12 PROCEDURE — 99999 PR PBB SHADOW E&M-EST. PATIENT-LVL III: ICD-10-PCS | Mod: PBBFAC,,, | Performed by: INTERNAL MEDICINE

## 2021-08-12 PROCEDURE — 3075F PR MOST RECENT SYSTOLIC BLOOD PRESS GE 130-139MM HG: ICD-10-PCS | Mod: CPTII,S$GLB,, | Performed by: INTERNAL MEDICINE

## 2021-08-12 PROCEDURE — 1101F PR PT FALLS ASSESS DOC 0-1 FALLS W/OUT INJ PAST YR: ICD-10-PCS | Mod: CPTII,S$GLB,, | Performed by: INTERNAL MEDICINE

## 2021-08-12 PROCEDURE — 1126F PR PAIN SEVERITY QUANTIFIED, NO PAIN PRESENT: ICD-10-PCS | Mod: CPTII,S$GLB,, | Performed by: INTERNAL MEDICINE

## 2021-08-12 PROCEDURE — 80053 COMPREHEN METABOLIC PANEL: CPT | Performed by: INTERNAL MEDICINE

## 2021-08-12 PROCEDURE — 84466 ASSAY OF TRANSFERRIN: CPT | Performed by: INTERNAL MEDICINE

## 2021-08-12 PROCEDURE — 3079F PR MOST RECENT DIASTOLIC BLOOD PRESSURE 80-89 MM HG: ICD-10-PCS | Mod: CPTII,S$GLB,, | Performed by: INTERNAL MEDICINE

## 2021-08-12 PROCEDURE — 1101F PT FALLS ASSESS-DOCD LE1/YR: CPT | Mod: CPTII,S$GLB,, | Performed by: INTERNAL MEDICINE

## 2021-08-12 PROCEDURE — 84425 ASSAY OF VITAMIN B-1: CPT | Performed by: INTERNAL MEDICINE

## 2021-08-12 PROCEDURE — 83880 ASSAY OF NATRIURETIC PEPTIDE: CPT | Performed by: INTERNAL MEDICINE

## 2021-08-12 PROCEDURE — 3288F PR FALLS RISK ASSESSMENT DOCUMENTED: ICD-10-PCS | Mod: CPTII,S$GLB,, | Performed by: INTERNAL MEDICINE

## 2021-08-12 PROCEDURE — 3075F SYST BP GE 130 - 139MM HG: CPT | Mod: CPTII,S$GLB,, | Performed by: INTERNAL MEDICINE

## 2021-08-12 PROCEDURE — 84443 ASSAY THYROID STIM HORMONE: CPT | Performed by: INTERNAL MEDICINE

## 2021-08-12 PROCEDURE — 3008F BODY MASS INDEX DOCD: CPT | Mod: CPTII,S$GLB,, | Performed by: INTERNAL MEDICINE

## 2021-08-12 PROCEDURE — 90791 PSYCH DIAGNOSTIC EVALUATION: CPT | Mod: S$GLB,,, | Performed by: SOCIAL WORKER

## 2021-08-13 LAB
ALBUMIN SERPL BCP-MCNC: 4.1 G/DL (ref 3.5–5.2)
ALP SERPL-CCNC: 78 U/L (ref 55–135)
ALT SERPL W/O P-5'-P-CCNC: 25 U/L (ref 10–44)
ANION GAP SERPL CALC-SCNC: 7 MMOL/L (ref 8–16)
AST SERPL-CCNC: 20 U/L (ref 10–40)
BILIRUB SERPL-MCNC: 0.5 MG/DL (ref 0.1–1)
BUN SERPL-MCNC: 24 MG/DL (ref 8–23)
CALCIUM SERPL-MCNC: 10.3 MG/DL (ref 8.7–10.5)
CHLORIDE SERPL-SCNC: 105 MMOL/L (ref 95–110)
CO2 SERPL-SCNC: 27 MMOL/L (ref 23–29)
CREAT SERPL-MCNC: 0.9 MG/DL (ref 0.5–1.4)
EST. GFR  (AFRICAN AMERICAN): >60 ML/MIN/1.73 M^2
EST. GFR  (NON AFRICAN AMERICAN): >60 ML/MIN/1.73 M^2
GLUCOSE SERPL-MCNC: 98 MG/DL (ref 70–110)
IRON SERPL-MCNC: 90 UG/DL (ref 30–160)
POTASSIUM SERPL-SCNC: 4.1 MMOL/L (ref 3.5–5.1)
PROT SERPL-MCNC: 6.9 G/DL (ref 6–8.4)
SATURATED IRON: 26 % (ref 20–50)
SODIUM SERPL-SCNC: 139 MMOL/L (ref 136–145)
TOTAL IRON BINDING CAPACITY: 351 UG/DL (ref 250–450)
TRANSFERRIN SERPL-MCNC: 237 MG/DL (ref 200–375)

## 2021-08-17 ENCOUNTER — PATIENT MESSAGE (OUTPATIENT)
Dept: INTERNAL MEDICINE | Facility: CLINIC | Age: 76
End: 2021-08-17

## 2021-08-17 LAB — VIT B1 BLD-MCNC: 70 UG/L (ref 38–122)

## 2021-08-18 ENCOUNTER — TELEPHONE (OUTPATIENT)
Dept: INTERNAL MEDICINE | Facility: CLINIC | Age: 76
End: 2021-08-18

## 2021-08-23 ENCOUNTER — TELEPHONE (OUTPATIENT)
Dept: INTERNAL MEDICINE | Facility: CLINIC | Age: 76
End: 2021-08-23

## 2021-08-26 ENCOUNTER — OFFICE VISIT (OUTPATIENT)
Dept: INTERNAL MEDICINE | Facility: CLINIC | Age: 76
End: 2021-08-26
Payer: MEDICARE

## 2021-08-26 VITALS
WEIGHT: 161.19 LBS | HEIGHT: 66 IN | DIASTOLIC BLOOD PRESSURE: 80 MMHG | SYSTOLIC BLOOD PRESSURE: 126 MMHG | BODY MASS INDEX: 25.9 KG/M2 | HEART RATE: 78 BPM | OXYGEN SATURATION: 97 %

## 2021-08-26 DIAGNOSIS — E78.2 MIXED HYPERLIPIDEMIA: ICD-10-CM

## 2021-08-26 DIAGNOSIS — R41.840 POOR CONCENTRATION: ICD-10-CM

## 2021-08-26 DIAGNOSIS — F41.9 ANXIETY: Primary | ICD-10-CM

## 2021-08-26 PROCEDURE — 99214 PR OFFICE/OUTPT VISIT, EST, LEVL IV, 30-39 MIN: ICD-10-PCS | Mod: S$GLB,,, | Performed by: INTERNAL MEDICINE

## 2021-08-26 PROCEDURE — 1126F PR PAIN SEVERITY QUANTIFIED, NO PAIN PRESENT: ICD-10-PCS | Mod: CPTII,S$GLB,, | Performed by: INTERNAL MEDICINE

## 2021-08-26 PROCEDURE — 3079F PR MOST RECENT DIASTOLIC BLOOD PRESSURE 80-89 MM HG: ICD-10-PCS | Mod: CPTII,S$GLB,, | Performed by: INTERNAL MEDICINE

## 2021-08-26 PROCEDURE — 1101F PT FALLS ASSESS-DOCD LE1/YR: CPT | Mod: CPTII,S$GLB,, | Performed by: INTERNAL MEDICINE

## 2021-08-26 PROCEDURE — 3288F PR FALLS RISK ASSESSMENT DOCUMENTED: ICD-10-PCS | Mod: CPTII,S$GLB,, | Performed by: INTERNAL MEDICINE

## 2021-08-26 PROCEDURE — 3074F SYST BP LT 130 MM HG: CPT | Mod: CPTII,S$GLB,, | Performed by: INTERNAL MEDICINE

## 2021-08-26 PROCEDURE — 3079F DIAST BP 80-89 MM HG: CPT | Mod: CPTII,S$GLB,, | Performed by: INTERNAL MEDICINE

## 2021-08-26 PROCEDURE — 1159F MED LIST DOCD IN RCRD: CPT | Mod: CPTII,S$GLB,, | Performed by: INTERNAL MEDICINE

## 2021-08-26 PROCEDURE — 99214 OFFICE O/P EST MOD 30 MIN: CPT | Mod: S$GLB,,, | Performed by: INTERNAL MEDICINE

## 2021-08-26 PROCEDURE — 1160F PR REVIEW ALL MEDS BY PRESCRIBER/CLIN PHARMACIST DOCUMENTED: ICD-10-PCS | Mod: CPTII,S$GLB,, | Performed by: INTERNAL MEDICINE

## 2021-08-26 PROCEDURE — 3008F PR BODY MASS INDEX (BMI) DOCUMENTED: ICD-10-PCS | Mod: CPTII,S$GLB,, | Performed by: INTERNAL MEDICINE

## 2021-08-26 PROCEDURE — 99999 PR PBB SHADOW E&M-EST. PATIENT-LVL IV: ICD-10-PCS | Mod: PBBFAC,,, | Performed by: INTERNAL MEDICINE

## 2021-08-26 PROCEDURE — 3008F BODY MASS INDEX DOCD: CPT | Mod: CPTII,S$GLB,, | Performed by: INTERNAL MEDICINE

## 2021-08-26 PROCEDURE — 3288F FALL RISK ASSESSMENT DOCD: CPT | Mod: CPTII,S$GLB,, | Performed by: INTERNAL MEDICINE

## 2021-08-26 PROCEDURE — 3074F PR MOST RECENT SYSTOLIC BLOOD PRESSURE < 130 MM HG: ICD-10-PCS | Mod: CPTII,S$GLB,, | Performed by: INTERNAL MEDICINE

## 2021-08-26 PROCEDURE — 99999 PR PBB SHADOW E&M-EST. PATIENT-LVL IV: CPT | Mod: PBBFAC,,, | Performed by: INTERNAL MEDICINE

## 2021-08-26 PROCEDURE — 1101F PR PT FALLS ASSESS DOC 0-1 FALLS W/OUT INJ PAST YR: ICD-10-PCS | Mod: CPTII,S$GLB,, | Performed by: INTERNAL MEDICINE

## 2021-08-26 PROCEDURE — 1160F RVW MEDS BY RX/DR IN RCRD: CPT | Mod: CPTII,S$GLB,, | Performed by: INTERNAL MEDICINE

## 2021-08-26 PROCEDURE — 1159F PR MEDICATION LIST DOCUMENTED IN MEDICAL RECORD: ICD-10-PCS | Mod: CPTII,S$GLB,, | Performed by: INTERNAL MEDICINE

## 2021-08-26 PROCEDURE — 1126F AMNT PAIN NOTED NONE PRSNT: CPT | Mod: CPTII,S$GLB,, | Performed by: INTERNAL MEDICINE

## 2021-08-26 RX ORDER — ESCITALOPRAM OXALATE 10 MG/1
10 TABLET ORAL DAILY
Qty: 30 TABLET | Refills: 11 | Status: SHIPPED | OUTPATIENT
Start: 2021-08-26 | End: 2022-12-06 | Stop reason: SDUPTHER

## 2021-09-03 ENCOUNTER — PATIENT MESSAGE (OUTPATIENT)
Dept: NEUROLOGY | Facility: CLINIC | Age: 76
End: 2021-09-03

## 2021-09-17 ENCOUNTER — PES CALL (OUTPATIENT)
Dept: ADMINISTRATIVE | Facility: CLINIC | Age: 76
End: 2021-09-17

## 2021-10-25 ENCOUNTER — TELEPHONE (OUTPATIENT)
Dept: OBSTETRICS AND GYNECOLOGY | Facility: CLINIC | Age: 76
End: 2021-10-25
Payer: MEDICARE

## 2021-10-25 DIAGNOSIS — Z12.31 SCREENING MAMMOGRAM, ENCOUNTER FOR: Primary | ICD-10-CM

## 2021-11-09 ENCOUNTER — TELEPHONE (OUTPATIENT)
Dept: INTERNAL MEDICINE | Facility: CLINIC | Age: 76
End: 2021-11-09
Payer: MEDICARE

## 2021-11-10 ENCOUNTER — TELEPHONE (OUTPATIENT)
Dept: INTERNAL MEDICINE | Facility: CLINIC | Age: 76
End: 2021-11-10
Payer: MEDICARE

## 2021-11-15 ENCOUNTER — TELEPHONE (OUTPATIENT)
Dept: INTERNAL MEDICINE | Facility: CLINIC | Age: 76
End: 2021-11-15
Payer: MEDICARE

## 2021-11-16 ENCOUNTER — OFFICE VISIT (OUTPATIENT)
Dept: INTERNAL MEDICINE | Facility: CLINIC | Age: 76
End: 2021-11-16
Payer: MEDICARE

## 2021-11-16 ENCOUNTER — LAB VISIT (OUTPATIENT)
Dept: LAB | Facility: HOSPITAL | Age: 76
End: 2021-11-16
Attending: INTERNAL MEDICINE
Payer: MEDICARE

## 2021-11-16 VITALS
SYSTOLIC BLOOD PRESSURE: 122 MMHG | DIASTOLIC BLOOD PRESSURE: 74 MMHG | HEART RATE: 60 BPM | WEIGHT: 155 LBS | HEIGHT: 66 IN | OXYGEN SATURATION: 98 % | BODY MASS INDEX: 24.91 KG/M2

## 2021-11-16 DIAGNOSIS — E78.2 MIXED HYPERLIPIDEMIA: ICD-10-CM

## 2021-11-16 DIAGNOSIS — F41.9 ANXIETY: ICD-10-CM

## 2021-11-16 DIAGNOSIS — I87.2 VENOUS INSUFFICIENCY OF BOTH LOWER EXTREMITIES: ICD-10-CM

## 2021-11-16 DIAGNOSIS — I83.93 VARICOSE VEINS OF BOTH LOWER EXTREMITIES, UNSPECIFIED WHETHER COMPLICATED: ICD-10-CM

## 2021-11-16 DIAGNOSIS — Z00.00 ROUTINE PHYSICAL EXAMINATION: Primary | ICD-10-CM

## 2021-11-16 LAB
CHOLEST SERPL-MCNC: 196 MG/DL (ref 120–199)
CHOLEST/HDLC SERPL: 2.9 {RATIO} (ref 2–5)
HDLC SERPL-MCNC: 68 MG/DL (ref 40–75)
HDLC SERPL: 34.7 % (ref 20–50)
LDLC SERPL CALC-MCNC: 112.2 MG/DL (ref 63–159)
NONHDLC SERPL-MCNC: 128 MG/DL
TRIGL SERPL-MCNC: 79 MG/DL (ref 30–150)

## 2021-11-16 PROCEDURE — 1101F PT FALLS ASSESS-DOCD LE1/YR: CPT | Mod: HCNC,CPTII,S$GLB, | Performed by: INTERNAL MEDICINE

## 2021-11-16 PROCEDURE — 1101F PR PT FALLS ASSESS DOC 0-1 FALLS W/OUT INJ PAST YR: ICD-10-PCS | Mod: HCNC,CPTII,S$GLB, | Performed by: INTERNAL MEDICINE

## 2021-11-16 PROCEDURE — 99397 PER PM REEVAL EST PAT 65+ YR: CPT | Mod: HCNC,S$GLB,, | Performed by: INTERNAL MEDICINE

## 2021-11-16 PROCEDURE — 99999 PR PBB SHADOW E&M-EST. PATIENT-LVL III: ICD-10-PCS | Mod: PBBFAC,HCNC,, | Performed by: INTERNAL MEDICINE

## 2021-11-16 PROCEDURE — 36415 COLL VENOUS BLD VENIPUNCTURE: CPT | Mod: HCNC | Performed by: INTERNAL MEDICINE

## 2021-11-16 PROCEDURE — 80061 LIPID PANEL: CPT | Mod: HCNC | Performed by: INTERNAL MEDICINE

## 2021-11-16 PROCEDURE — 3074F PR MOST RECENT SYSTOLIC BLOOD PRESSURE < 130 MM HG: ICD-10-PCS | Mod: HCNC,CPTII,S$GLB, | Performed by: INTERNAL MEDICINE

## 2021-11-16 PROCEDURE — 3288F PR FALLS RISK ASSESSMENT DOCUMENTED: ICD-10-PCS | Mod: HCNC,CPTII,S$GLB, | Performed by: INTERNAL MEDICINE

## 2021-11-16 PROCEDURE — 1126F PR PAIN SEVERITY QUANTIFIED, NO PAIN PRESENT: ICD-10-PCS | Mod: HCNC,CPTII,S$GLB, | Performed by: INTERNAL MEDICINE

## 2021-11-16 PROCEDURE — 1159F MED LIST DOCD IN RCRD: CPT | Mod: HCNC,CPTII,S$GLB, | Performed by: INTERNAL MEDICINE

## 2021-11-16 PROCEDURE — 1126F AMNT PAIN NOTED NONE PRSNT: CPT | Mod: HCNC,CPTII,S$GLB, | Performed by: INTERNAL MEDICINE

## 2021-11-16 PROCEDURE — 3078F DIAST BP <80 MM HG: CPT | Mod: HCNC,CPTII,S$GLB, | Performed by: INTERNAL MEDICINE

## 2021-11-16 PROCEDURE — 1159F PR MEDICATION LIST DOCUMENTED IN MEDICAL RECORD: ICD-10-PCS | Mod: HCNC,CPTII,S$GLB, | Performed by: INTERNAL MEDICINE

## 2021-11-16 PROCEDURE — 99499 UNLISTED E&M SERVICE: CPT | Mod: HCNC,S$GLB,, | Performed by: INTERNAL MEDICINE

## 2021-11-16 PROCEDURE — 99499 RISK ADDL DX/OHS AUDIT: ICD-10-PCS | Mod: HCNC,S$GLB,, | Performed by: INTERNAL MEDICINE

## 2021-11-16 PROCEDURE — 3288F FALL RISK ASSESSMENT DOCD: CPT | Mod: HCNC,CPTII,S$GLB, | Performed by: INTERNAL MEDICINE

## 2021-11-16 PROCEDURE — 99397 PR PREVENTIVE VISIT,EST,65 & OVER: ICD-10-PCS | Mod: HCNC,S$GLB,, | Performed by: INTERNAL MEDICINE

## 2021-11-16 PROCEDURE — 3008F BODY MASS INDEX DOCD: CPT | Mod: HCNC,CPTII,S$GLB, | Performed by: INTERNAL MEDICINE

## 2021-11-16 PROCEDURE — 3078F PR MOST RECENT DIASTOLIC BLOOD PRESSURE < 80 MM HG: ICD-10-PCS | Mod: HCNC,CPTII,S$GLB, | Performed by: INTERNAL MEDICINE

## 2021-11-16 PROCEDURE — 3074F SYST BP LT 130 MM HG: CPT | Mod: HCNC,CPTII,S$GLB, | Performed by: INTERNAL MEDICINE

## 2021-11-16 PROCEDURE — 99999 PR PBB SHADOW E&M-EST. PATIENT-LVL III: CPT | Mod: PBBFAC,HCNC,, | Performed by: INTERNAL MEDICINE

## 2021-11-16 PROCEDURE — 3008F PR BODY MASS INDEX (BMI) DOCUMENTED: ICD-10-PCS | Mod: HCNC,CPTII,S$GLB, | Performed by: INTERNAL MEDICINE

## 2021-11-22 ENCOUNTER — NURSE TRIAGE (OUTPATIENT)
Dept: ADMINISTRATIVE | Facility: CLINIC | Age: 76
End: 2021-11-22
Payer: MEDICARE

## 2022-01-14 ENCOUNTER — HOSPITAL ENCOUNTER (OUTPATIENT)
Dept: RADIOLOGY | Facility: HOSPITAL | Age: 77
Discharge: HOME | End: 2022-01-14
Attending: OBSTETRICS & GYNECOLOGY
Payer: MEDICARE

## 2022-01-14 DIAGNOSIS — Z12.31 SCREENING MAMMOGRAM, ENCOUNTER FOR: ICD-10-CM

## 2022-01-14 PROCEDURE — 77067 MAMMO DIGITAL SCREENING BILAT WITH TOMO: ICD-10-PCS | Mod: 26,HCNC,, | Performed by: RADIOLOGY

## 2022-01-14 PROCEDURE — 77063 BREAST TOMOSYNTHESIS BI: CPT | Mod: 26,HCNC,, | Performed by: RADIOLOGY

## 2022-01-14 PROCEDURE — 77063 MAMMO DIGITAL SCREENING BILAT WITH TOMO: ICD-10-PCS | Mod: 26,HCNC,, | Performed by: RADIOLOGY

## 2022-01-14 PROCEDURE — 77063 BREAST TOMOSYNTHESIS BI: CPT | Mod: TC,HCNC

## 2022-01-14 PROCEDURE — 77067 SCR MAMMO BI INCL CAD: CPT | Mod: 26,HCNC,, | Performed by: RADIOLOGY

## 2022-01-14 PROCEDURE — 77067 SCR MAMMO BI INCL CAD: CPT | Mod: TC,HCNC

## 2022-02-11 NOTE — PROGRESS NOTES
Subjective:       Patient ID: Katiana Hagan is a 72 y.o. female.    Chief Complaint:  Well Woman      History of Present Illness  HPI    Katiana Hagan is a 72 y.o. female  here for her annual GYN exam.  Her urinary symptoms are still present, but she stopped Bladder PT , got tired of it.   She describes her periods as stopped with menopause about 20 years ago,   denies break through bleeding.   denies vaginal itching or irritation.  Denies vaginal discharge.  She is not sexually active. She has been  for the past 3 years   History of abnormal pap: No  Last Pap: approximate date  and was normal  Last MMG: normal--routine follow-up in 12 months  Last Colonoscopy:  None (had Cologard- normal)  denies domestic violence. She does feel safe at home.     Past Medical History:   Diagnosis Date    Other and unspecified hyperlipidemia 10/29/2013     Past Surgical History:   Procedure Laterality Date    APPENDECTOMY  age 18    Breast augmentation  1988    COSMETIC SURGERY  2016    facelift    DILATION AND CURETTAGE OF UTERUS      MYOMECTOMY       Social History     Socioeconomic History    Marital status:      Spouse name: Not on file    Number of children: Not on file    Years of education: Not on file    Highest education level: Not on file   Social Needs    Financial resource strain: Not on file    Food insecurity - worry: Not on file    Food insecurity - inability: Not on file    Transportation needs - medical: Not on file    Transportation needs - non-medical: Not on file   Occupational History    Not on file   Tobacco Use    Smoking status: Never Smoker    Smokeless tobacco: Never Used   Substance and Sexual Activity    Alcohol use: Yes     Alcohol/week: 1.2 oz     Types: 2 Glasses of wine per week     Comment: 1-2 drinks weekly - wine or rodrigue    Drug use: No    Sexual activity: Not Currently     Partners: Male     Birth control/protection: Post-menopausal  "[facility]   Physical Therapy Treatment Note     Name: Nae Escalante  Clinic Number: [id number]    Therapy Diagnosis:   Encounter Diagnoses   Name Primary?   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   She was compliant with home exercise program.  Response to previous treatment: no adverse effects.   Functional change: ongoing    Pain: 0/10  Location: right shoulder      OBJECTIVE     R shoulder AROM flexion 152 degrees today.      Treatment     Nae received the treatments listed below:    Therapeutic exercises to develop strength, endurance, ROM, flexibility, posture and core stabilization for 45 minutes including:    Rhythmic perturbations  Flexion 90, 120 degrees -> 5 rounds      ER/IR various angles --> 5 rounds  Reversal of agonists  ER/IR various angles --> 5 rounds    Serratus punches  3x15 3# dumbbell series  Shoulder taps    3'x1  High mat triceps push-ups with plus 1x1' (mid-range only)    Prone scapular retractions 20x   Prone shoulder extension 20x  Prone horizontal abd  10x NT  Mat plank on fitter   30" x3      manual therapy techniques: Joint mobilizations were applied to the: R shoulder for 5 minutes, including:  Shoulder passive physiological flexion, ER, IR grade II-III      Patient Education and Home " "    Comment:     Other Topics Concern    Not on file   Social History Narrative    Not on file     Family History   Problem Relation Age of Onset    Breast cancer Maternal Aunt     No Known Problems Father     No Known Problems Mother     Diabetes Sister     Schizophrenia Son     Heart disease Sister     No Known Problems Son     Colon cancer Neg Hx     Hypertension Neg Hx     Ovarian cancer Neg Hx     Stroke Neg Hx      OB History      Para Term  AB Living    2 2 2     2    SAB TAB Ectopic Multiple Live Births            2          /70   Ht 5' 6" (1.676 m)   Wt 65.8 kg (145 lb 1 oz)   LMP 1998 (Approximate)   BMI 23.41 kg/m²         GYN & OB History  Patient's last menstrual period was 1998 (approximate).   Date of Last Pap: 2016    OB History    Para Term  AB Living   2 2 2     2   SAB TAB Ectopic Multiple Live Births           2      # Outcome Date GA Lbr Curt/2nd Weight Sex Delivery Anes PTL Lv   2 Term      Vag-Spont  N BARBY   1 Term      Vag-Spont  N BARBY          Review of Systems  Review of Systems   Constitutional: Negative for activity change, appetite change, fatigue and unexpected weight change.   HENT: Negative.    Eyes: Negative for visual disturbance.   Respiratory: Negative for shortness of breath and wheezing.    Cardiovascular: Negative for chest pain, palpitations and leg swelling.   Gastrointestinal: Negative for abdominal pain, bloating and blood in stool.   Endocrine: Negative for diabetes, hair loss and hot flashes.   Genitourinary: Positive for urgency and urinary incontinence. Negative for decreased libido, dyspareunia, frequency and postmenopausal bleeding.   Musculoskeletal: Negative for back pain and joint swelling.   Skin:  Negative for no acne and hair changes.   Neurological: Negative for headaches.   Hematological: Does not bruise/bleed easily.   Psychiatric/Behavioral: Negative for depression and sleep " Exercises     Home Exercises Provided and Patient Education Provided   Education provided:   - cont HEP    Written Home Exercises Provided: yes. Exercises were reviewed and Nae was able to demonstrate them prior to the end of the session.  Nae demonstrated good  understanding of the education provided. See EMR under Patient Instructions for exercises provided during therapy sessions    ASSESSMENT   Nae is a 25 y/o F with R shoulder pain.  History of competitive gymnastics.  She has been compliant with her HEP and holding UE gym resistance training at this time.Pt presents with steady improvements in shoulder ROM with decreasing tissue irritability. External rotation movements are primary aggravating movement secondary to anterior instability. Minimal progressions made to treatment plan as focus on shoulder stabilization and rotator cuff activation. Pt with moderate fatigue with exercises post tx without exacerbation of shoulder pain. Continue to progress per tolerance.        Nae Is progressing well towards her goals.   Pt prognosis is Excellent.     Pt will continue to benefit from skilled outpatient physical therapy to address the deficits listed in the problem list box on initial evaluation, provide pt/family education and to maximize pt's level of independence in the home and community environment.   Pt's spiritual, cultural and educational needs considered and pt agreeable to plan of care and goals.     Anticipated barriers to physical therapy: work conditions(2 gymnastic coaching jobs)    GOALS:   Short Term Goals: 4 weeks  1. Report decreased R shoulder pain </= 2/10 at rest and during low grade shoulder activito increase tolerance for ADLs and increased QoL. Progressing towards; not met  2. Increase R shoulder PROM by 20 degrees in flexion and external rotation for increased functional mobility.  Progressing towards; not met  3. Increased strength by 1/3 MMT grade in R shoulder to increase tolerance  disturbance. The patient is not nervous/anxious.    Breast: Negative for breast pain and nipple discharge          Objective:      Physical Exam:   Constitutional: She is oriented to person, place, and time. She appears well-developed and well-nourished.    HENT:   Head: Normocephalic and atraumatic.    Eyes: EOM are normal. Pupils are equal, round, and reactive to light.    Neck: Normal range of motion. Neck supple.    Cardiovascular: Normal rate and regular rhythm.     Pulmonary/Chest: Effort normal and breath sounds normal.   BREASTS:  no mass, no tenderness, no deformity and no retraction. Right breast exhibits no inverted nipple, no mass, no nipple discharge, no skin change, no tenderness, no bleeding and no swelling. Left breast exhibits no inverted nipple, no mass, no nipple discharge, no skin change, no tenderness, no bleeding and no swelling. Breasts are symmetrical.      Implants bilaterally        Abdominal: Soft. Bowel sounds are normal.     Genitourinary: Pelvic exam was performed with patient supine.   Genitourinary Comments: PELVIC: Normal external genitalia without lesions.  Normal hair distribution.  Adequate perineal body, normal urethral meatus.  Vagina  Dry and poorly rugated, atrophic, without lesions or discharge.  Cervix pink, without lesions, discharge or tenderness.  No significant cystocele or rectocele.  Bimanual exam shows uterus to be normal size, regular, mobile and nontender.  Adnexa without masses or tenderness.    RECTAL:Deferred             Musculoskeletal: Normal range of motion and moves all extremeties.       Neurological: She is alert and oriented to person, place, and time.    Skin: Skin is warm and dry.    Psychiatric: She has a normal mood and affect.              Assessment:        1. Encounter for gynecological examination without abnormal finding    2. Screening mammogram, encounter for    3. Postmenopausal atrophic vaginitis               Plan:        1. Encounter for  for ADL and work activities.  Progressing towards; not met  4. Pt to tolerate HEP to improve ROM and independence with ADL's.  Progressing towards; not met     Long Term Goals: 8 weeks  1. Report decreased R shoulder pain </= 1/10 for overhead activities to increase tolerance for ADLs and increased QoL.  Progressing towards; not met  2. Increase R shoulder AROM to 180 degrees of active abduction and flexion for increased functional mobility and independent ADLs with decreased pain. Progressing towards; not met  3. Increase strength to >/= 4+/5 in BUE to increase tolerance for ADL and work activities. Progressing towards; not met  4. Pt goal: to reduce my shoulder pain and raise my arm without difficulty. Progressing towards; not met   5. Pt will have improved to score of </= 27% limited on shoulder FOTO in order to demonstrate true functional improvement. Progressing towards; not met  6. Pt will tests negative and/or very mild positive testing for R shoulder rotator cuff and labral special tests.  Progressing towards; not met    PLAN     Cont POC shoulder stability, RTC strengthening    RAPHAEL SINGH, PT, DPT     gynecological examination without abnormal finding  COUNSELING:  The patient was counseled today on osteoporosis prevention, calcium supplementation, and regular weight bearing exercise. The patient was also counseled today on ACS PAP guidelines, with recommendations for yearly pelvic exams unless their uterus, cervix, and ovaries were removed for benign reasons; in that case, examinations every 3-5 years are recommended. The patient was also counseled regarding monthly breast self-examination, routine STD screening for at-risk populations, prophylactic immunizations for transmitted infections such as HPV, Pertussis, or Influenza as appropriate, and yearly mammograms when indicated by ACS guidelines. She was advised to see her primary care physician for all other health maintenance.   FOLLOW-UP with me for next routine visit.         2. Screening mammogram, encounter for    - Mammo Digital Screening Bilat with Tomosynthesis CAD; Future    3. Postmenopausal atrophic vaginitis    - conjugated estrogens (PREMARIN) vaginal cream; Place 0.5 g vaginally 3 (three) times a week. Insert into vagina as directed  Dispense: 30 g; Refill: 4       Follow-up in about 2 years (around 9/24/2020).

## 2022-02-16 ENCOUNTER — TELEPHONE (OUTPATIENT)
Dept: OBSTETRICS AND GYNECOLOGY | Facility: CLINIC | Age: 77
End: 2022-02-16
Payer: MEDICARE

## 2022-02-16 NOTE — TELEPHONE ENCOUNTER
----- Message from Halima Mahajan sent at 2/16/2022  9:11 AM CST -----  Regarding: medication question  Name of Who is Calling: Katiana           What is the request in detail: Patient is requesting a call back from the doctor to discuss PREMARIN. She will be at the dentist from 11:30- 1:30 today.           Can the clinic reply by [facility]: No           What Number to Call Back if not in [facility]: [phone] or [phone]

## 2022-02-17 ENCOUNTER — LAB VISIT (OUTPATIENT)
Dept: LAB | Facility: HOSPITAL | Age: 77
End: 2022-02-17
Attending: INTERNAL MEDICINE
Payer: MEDICARE

## 2022-02-17 ENCOUNTER — OFFICE VISIT (OUTPATIENT)
Dept: OTOLARYNGOLOGY | Facility: CLINIC | Age: 77
End: 2022-02-17
Payer: MEDICARE

## 2022-02-17 ENCOUNTER — PATIENT MESSAGE (OUTPATIENT)
Dept: INTERNAL MEDICINE | Facility: CLINIC | Age: 77
End: 2022-02-17
Payer: MEDICARE

## 2022-02-17 DIAGNOSIS — H61.23 BILATERAL IMPACTED CERUMEN: Primary | ICD-10-CM

## 2022-02-17 DIAGNOSIS — Z00.00 ROUTINE PHYSICAL EXAMINATION: ICD-10-CM

## 2022-02-17 DIAGNOSIS — E78.2 MIXED HYPERLIPIDEMIA: ICD-10-CM

## 2022-02-17 LAB
CHOLEST SERPL-MCNC: 199 MG/DL (ref 120–199)
CHOLEST/HDLC SERPL: 3.2 {RATIO} (ref 2–5)
HDLC SERPL-MCNC: 62 MG/DL (ref 40–75)
HDLC SERPL: 31.2 % (ref 20–50)
LDLC SERPL CALC-MCNC: 118 MG/DL (ref 63–159)
NONHDLC SERPL-MCNC: 137 MG/DL
TRIGL SERPL-MCNC: 95 MG/DL (ref 30–150)

## 2022-02-17 PROCEDURE — 1159F PR MEDICATION LIST DOCUMENTED IN MEDICAL RECORD: ICD-10-PCS | Mod: HCNC,CPTII,S$GLB, | Performed by: NURSE PRACTITIONER

## 2022-02-17 PROCEDURE — 69210 REMOVE IMPACTED EAR WAX UNI: CPT | Mod: HCNC,S$GLB,, | Performed by: NURSE PRACTITIONER

## 2022-02-17 PROCEDURE — 1160F PR REVIEW ALL MEDS BY PRESCRIBER/CLIN PHARMACIST DOCUMENTED: ICD-10-PCS | Mod: HCNC,CPTII,S$GLB, | Performed by: NURSE PRACTITIONER

## 2022-02-17 PROCEDURE — 1159F MED LIST DOCD IN RCRD: CPT | Mod: HCNC,CPTII,S$GLB, | Performed by: NURSE PRACTITIONER

## 2022-02-17 PROCEDURE — 80061 LIPID PANEL: CPT | Mod: HCNC | Performed by: INTERNAL MEDICINE

## 2022-02-17 PROCEDURE — 69210 EAR CERUMEN REMOVAL: ICD-10-PCS | Mod: HCNC,S$GLB,, | Performed by: NURSE PRACTITIONER

## 2022-02-17 PROCEDURE — 3288F FALL RISK ASSESSMENT DOCD: CPT | Mod: HCNC,CPTII,S$GLB, | Performed by: NURSE PRACTITIONER

## 2022-02-17 PROCEDURE — 99999 PR PBB SHADOW E&M-EST. PATIENT-LVL III: CPT | Mod: PBBFAC,HCNC,, | Performed by: NURSE PRACTITIONER

## 2022-02-17 PROCEDURE — 1101F PT FALLS ASSESS-DOCD LE1/YR: CPT | Mod: HCNC,CPTII,S$GLB, | Performed by: NURSE PRACTITIONER

## 2022-02-17 PROCEDURE — 1126F AMNT PAIN NOTED NONE PRSNT: CPT | Mod: HCNC,CPTII,S$GLB, | Performed by: NURSE PRACTITIONER

## 2022-02-17 PROCEDURE — 1160F RVW MEDS BY RX/DR IN RCRD: CPT | Mod: HCNC,CPTII,S$GLB, | Performed by: NURSE PRACTITIONER

## 2022-02-17 PROCEDURE — 99499 NO LOS: ICD-10-PCS | Mod: HCNC,S$GLB,, | Performed by: NURSE PRACTITIONER

## 2022-02-17 PROCEDURE — 36415 COLL VENOUS BLD VENIPUNCTURE: CPT | Mod: HCNC | Performed by: INTERNAL MEDICINE

## 2022-02-17 PROCEDURE — 1101F PR PT FALLS ASSESS DOC 0-1 FALLS W/OUT INJ PAST YR: ICD-10-PCS | Mod: HCNC,CPTII,S$GLB, | Performed by: NURSE PRACTITIONER

## 2022-02-17 PROCEDURE — 3288F PR FALLS RISK ASSESSMENT DOCUMENTED: ICD-10-PCS | Mod: HCNC,CPTII,S$GLB, | Performed by: NURSE PRACTITIONER

## 2022-02-17 PROCEDURE — 99499 UNLISTED E&M SERVICE: CPT | Mod: HCNC,S$GLB,, | Performed by: NURSE PRACTITIONER

## 2022-02-17 PROCEDURE — 1126F PR PAIN SEVERITY QUANTIFIED, NO PAIN PRESENT: ICD-10-PCS | Mod: HCNC,CPTII,S$GLB, | Performed by: NURSE PRACTITIONER

## 2022-02-17 PROCEDURE — 99999 PR PBB SHADOW E&M-EST. PATIENT-LVL III: ICD-10-PCS | Mod: PBBFAC,HCNC,, | Performed by: NURSE PRACTITIONER

## 2022-02-17 NOTE — PROCEDURES
Ear Cerumen Removal    Date/Time: 2/17/2022 10:00 AM  Performed by: Jacqueline Rosales DNP, FNP-C  Authorized by: Jacqueline Rosales DNP, FNP-C     Consent Done?:  Yes (Verbal)    Local anesthetic:  None  Location details:  Both ears  Procedure type: curette    Cerumen  Removal Results:  Cerumen completely removed  Patient tolerance:  Patient tolerated the procedure well with no immediate complications     Procedure Note:    The patient was brought to the minor procedure room and placed under the operating microscope of the left ear canal which was cleaned of ceruminous debris. Using a combination of suction, curettes and cup forceps the patient's cerumen impaction was removed. The tympanic membrane was evaluated and was unremarkable. The patient tolerated the procedure well. There were no complications.    Procedure Note:    Patient was brought to the minor procedure room and using the operating microscope of the right ear canal which was cleaned of ceruminous debris. There was a significant cerumen impaction.  Using a combination of suction, curettes and cup forceps the patient's cerumen impaction was removed. Tympanic membrane intact. Pt tolerated well. There were no complications.

## 2022-02-21 ENCOUNTER — PATIENT OUTREACH (OUTPATIENT)
Dept: ADMINISTRATIVE | Facility: OTHER | Age: 77
End: 2022-02-21
Payer: MEDICARE

## 2022-02-21 ENCOUNTER — OFFICE VISIT (OUTPATIENT)
Dept: OBSTETRICS AND GYNECOLOGY | Facility: CLINIC | Age: 77
End: 2022-02-21
Payer: MEDICARE

## 2022-02-21 VITALS
WEIGHT: 153 LBS | HEIGHT: 66 IN | BODY MASS INDEX: 24.59 KG/M2 | SYSTOLIC BLOOD PRESSURE: 150 MMHG | DIASTOLIC BLOOD PRESSURE: 90 MMHG

## 2022-02-21 DIAGNOSIS — N95.2 POSTMENOPAUSAL ATROPHIC VAGINITIS: ICD-10-CM

## 2022-02-21 DIAGNOSIS — Z12.11 ENCOUNTER FOR FIT (FECAL IMMUNOCHEMICAL TEST) SCREENING: Primary | ICD-10-CM

## 2022-02-21 DIAGNOSIS — Z01.419 WELL WOMAN EXAM WITH ROUTINE GYNECOLOGICAL EXAM: Primary | ICD-10-CM

## 2022-02-21 PROCEDURE — 1101F PR PT FALLS ASSESS DOC 0-1 FALLS W/OUT INJ PAST YR: ICD-10-PCS | Mod: HCNC,CPTII,S$GLB, | Performed by: NURSE PRACTITIONER

## 2022-02-21 PROCEDURE — 1159F PR MEDICATION LIST DOCUMENTED IN MEDICAL RECORD: ICD-10-PCS | Mod: HCNC,CPTII,S$GLB, | Performed by: NURSE PRACTITIONER

## 2022-02-21 PROCEDURE — 1126F PR PAIN SEVERITY QUANTIFIED, NO PAIN PRESENT: ICD-10-PCS | Mod: HCNC,CPTII,S$GLB, | Performed by: NURSE PRACTITIONER

## 2022-02-21 PROCEDURE — 99999 PR PBB SHADOW E&M-EST. PATIENT-LVL III: CPT | Mod: PBBFAC,HCNC,, | Performed by: NURSE PRACTITIONER

## 2022-02-21 PROCEDURE — 3077F SYST BP >= 140 MM HG: CPT | Mod: HCNC,CPTII,S$GLB, | Performed by: NURSE PRACTITIONER

## 2022-02-21 PROCEDURE — G0101 PR CA SCREEN;PELVIC/BREAST EXAM: ICD-10-PCS | Mod: HCNC,S$GLB,, | Performed by: NURSE PRACTITIONER

## 2022-02-21 PROCEDURE — 3008F PR BODY MASS INDEX (BMI) DOCUMENTED: ICD-10-PCS | Mod: HCNC,CPTII,S$GLB, | Performed by: NURSE PRACTITIONER

## 2022-02-21 PROCEDURE — 3080F PR MOST RECENT DIASTOLIC BLOOD PRESSURE >= 90 MM HG: ICD-10-PCS | Mod: HCNC,CPTII,S$GLB, | Performed by: NURSE PRACTITIONER

## 2022-02-21 PROCEDURE — 3077F PR MOST RECENT SYSTOLIC BLOOD PRESSURE >= 140 MM HG: ICD-10-PCS | Mod: HCNC,CPTII,S$GLB, | Performed by: NURSE PRACTITIONER

## 2022-02-21 PROCEDURE — 99999 PR PBB SHADOW E&M-EST. PATIENT-LVL III: ICD-10-PCS | Mod: PBBFAC,HCNC,, | Performed by: NURSE PRACTITIONER

## 2022-02-21 PROCEDURE — G0101 CA SCREEN;PELVIC/BREAST EXAM: HCPCS | Mod: HCNC,S$GLB,, | Performed by: NURSE PRACTITIONER

## 2022-02-21 PROCEDURE — 1101F PT FALLS ASSESS-DOCD LE1/YR: CPT | Mod: HCNC,CPTII,S$GLB, | Performed by: NURSE PRACTITIONER

## 2022-02-21 PROCEDURE — 3288F FALL RISK ASSESSMENT DOCD: CPT | Mod: HCNC,CPTII,S$GLB, | Performed by: NURSE PRACTITIONER

## 2022-02-21 PROCEDURE — 1159F MED LIST DOCD IN RCRD: CPT | Mod: HCNC,CPTII,S$GLB, | Performed by: NURSE PRACTITIONER

## 2022-02-21 PROCEDURE — 1126F AMNT PAIN NOTED NONE PRSNT: CPT | Mod: HCNC,CPTII,S$GLB, | Performed by: NURSE PRACTITIONER

## 2022-02-21 PROCEDURE — 3008F BODY MASS INDEX DOCD: CPT | Mod: HCNC,CPTII,S$GLB, | Performed by: NURSE PRACTITIONER

## 2022-02-21 PROCEDURE — 3080F DIAST BP >= 90 MM HG: CPT | Mod: HCNC,CPTII,S$GLB, | Performed by: NURSE PRACTITIONER

## 2022-02-21 PROCEDURE — 3288F PR FALLS RISK ASSESSMENT DOCUMENTED: ICD-10-PCS | Mod: HCNC,CPTII,S$GLB, | Performed by: NURSE PRACTITIONER

## 2022-02-21 RX ORDER — CONJUGATED ESTROGENS 0.62 MG/G
CREAM VAGINAL
Qty: 30 G | Refills: 4 | Status: SHIPPED | OUTPATIENT
Start: 2022-02-21 | End: 2022-02-21

## 2022-02-21 RX ORDER — CONJUGATED ESTROGENS 0.62 MG/G
CREAM VAGINAL
Qty: 60 G | Refills: 4 | Status: SHIPPED | OUTPATIENT
Start: 2022-02-21 | End: 2022-02-23

## 2022-02-21 NOTE — PROGRESS NOTES
Care Everywhere: updated  Immunization: updated  Health Maintenance: updated  Media Review: review for outside colon cancer report   Legacy Review:   DIS:  Order placed: fit kit   Upcoming appts:  EFAX:  Task Tickets:  Referrals:

## 2022-02-21 NOTE — PROGRESS NOTES
CC: Annual  HPI: Pt is a 76 y.o.  female who presents for routine annual exam. New to me. Postmenopausal. She is not on HRT. Uses premarin cream 3x weekly for atrophy- needs refill. Recent mammogram negative. Going to [place] in 2 days- plans to stay there for a few months to help care for her sister who had a stroke. Asking for a larger amount of the premarin cream to get her through her stay. Still having incontinence issues- has seen urology before, may make fu when she returns.     NOTES FROM LAST [facility] IN 2019 WITH DR VARGAS-  History of Present Illness  HPI  Katiana Hagan is a 73 y.o. female  here for her annual GYN exam.    She describes her periods as stopped age 52   denies break through bleeding.   denies vaginal itching or irritation.  Denies vaginal discharge.  She is not sexually active.      History of abnormal pap: No  Last Pap: approximate date  and was normal  Last MMG: normal--routine follow-up in 12 months  Last Colonoscopy: NONE  denies domestic violence. She does feel safe at home.      FH:   Breast cancer: maternal aunt  Colon cancer: none  Ovarian cancer: none  Uterine cancer: none    HPV vaccine: na  COVID vaccine: yes    Last pap smear:  nilm  History of abnormal pap smears: no  Colonoscopy:  DEXA: UTD- repeat in 2022  Mammogram: utd  STD history: no  Birth control: na  OB history:   Tobacco use: no    ROS:  GENERAL: Feeling well overall. Denies fever or chills.   SKIN: Denies rash or lesions.   HEAD: Denies head injury or headache.   NODES: Denies enlarged lymph nodes.   CHEST: Denies chest pain or shortness of breath.   CARDIOVASCULAR: Denies palpitations or left sided chest pain.   ABDOMEN: No abdominal pain, constipation, diarrhea, nausea, vomiting or rectal bleeding.   URINARY: No dysuria, hematuria, or burning on urination.  REPRODUCTIVE: See HPI.   BREASTS: Denies pain, lumps, or nipple discharge.   HEMATOLOGIC: No easy bruisability or excessive bleeding.    MUSCULOSKELETAL: Denies joint pain or swelling.   NEUROLOGIC: Denies syncope or weakness.   PSYCHIATRIC: Denies depression, anxiety or mood swings.    PE:   APPEARANCE: Well nourished, well developed, White female in no acute distress.  NODES: no cervical, supraclavicular, or inguinal lymphadenopathy  BREASTS: Symmetrical, no skin changes or visible lesions. No palpable masses, nipple discharge or adenopathy bilaterally.  ABDOMEN: Soft. No tenderness or masses. No distention. No hernias palpated. No CVA tenderness.  VULVA: No lesions. Normal external female genitalia.  URETHRAL MEATUS: Normal size and location, no lesions, no prolapse.  URETHRA: No masses, tenderness, or prolapse.  VAGINA: atrophy No lesions or lacerations noted. No abnormal discharge present. No odor present.   CERVIX: No lesions or discharge. No cervical motion tenderness.   UTERUS: Normal size, regular shape, mobile, non-tender.  ADNEXA: No tenderness. No fullness or masses palpated in the adnexal regions.   ANUS PERINEUM: Normal.      Diagnosis:  1. Well woman exam with routine gynecological exam    2. Postmenopausal atrophic vaginitis        Plan:     Orders Placed This Encounter    conjugated estrogens (PREMARIN) vaginal cream     1. mammo current  2. DEXA due November 2022  3. Pap not indicated  4. Premarin refilled    Patient was counseled today on the new ACS guidelines for cervical cytology screening as well as the current recommendations for breast cancer screening. She was counseled to follow up with her PCP for other routine health maintenance. Counseling session lasted approximately 10 minutes, and all her questions were answered.  For women over the age of 65, you can stop having cervical cancer screenings if you have never had abnormal cervical cells or cervical cancer, and youve had three negative Pap tests in a row. (You also can stop screening if youve had two negative Pap and HPV tests in a row in the past 10 years, with at  least one test in the past 5 years.),    Follow-up with me in 1 year for routine exam

## 2022-02-22 ENCOUNTER — TELEPHONE (OUTPATIENT)
Dept: OBSTETRICS AND GYNECOLOGY | Facility: CLINIC | Age: 77
End: 2022-02-22
Payer: MEDICARE

## 2022-02-22 NOTE — TELEPHONE ENCOUNTER
Reached out to pt to inform her that prescription was called in. Pt stated that wanted her prescription called in to [facility] on [address] due to [facility] being out of medication      ----- Message from Alvaro Sepulveda sent at 2/22/2022  4:37 PM CST -----  Pt says she is going out of town she needs a refill on her medication pharmacy # [id number]

## 2022-02-23 ENCOUNTER — TELEPHONE (OUTPATIENT)
Dept: OBSTETRICS AND GYNECOLOGY | Facility: CLINIC | Age: 77
End: 2022-02-23
Payer: MEDICARE

## 2022-02-23 DIAGNOSIS — N95.2 POSTMENOPAUSAL ATROPHIC VAGINITIS: ICD-10-CM

## 2022-02-23 RX ORDER — CONJUGATED ESTROGENS 0.62 MG/G
CREAM VAGINAL
Qty: 60 G | Refills: 4 | Status: SHIPPED | OUTPATIENT
Start: 2022-02-23 | End: 2023-03-16 | Stop reason: SDUPTHER

## 2022-02-23 RX ORDER — CONJUGATED ESTROGENS 0.62 MG/G
CREAM VAGINAL
Qty: 60 G | Refills: 4 | Status: SHIPPED | OUTPATIENT
Start: 2022-02-23 | End: 2022-02-23 | Stop reason: SDUPTHER

## 2022-02-23 NOTE — TELEPHONE ENCOUNTER
Informed pt of prescription being called in        ----- Message from Lyn Mueller MA sent at 2/23/2022  8:10 AM CST -----  Regarding: pt requesting a call back  Name of Who is Calling: ANASTACIO OG [[id number]]           What is the request in detail: pt requesting a call back needs to speak with someone in office about [facility] pt is leaving to go out of town            Can the clinic reply by [facility]: N            What Number to Call Back if not in [facility]: [phone]

## 2022-02-23 NOTE — TELEPHONE ENCOUNTER
----- Message from Summer Pinto sent at 2/23/2022  8:16 AM CST -----  Contact: Pt Home [phone]  Requesting an RX refill or new RX.  Is this a refill or new RX: Refill  RX name and strength Premarin vaginal cream  Is this a 30 day or 90 day RX:   Pharmacy name and phone # [facility] address: [facility] [address] [place], [address]  Phone number # [phone]  The doctors have asked that we provide their patients with the following 2 reminders -- prescription refills can take up to 72 hours, and a friendly reminder that in the future you can use your [facility] account to request refills:   Comment: Patient said that none of the [facility] have the Premarin, and she would like for you to save [facility] in the system please. Patient is going out of town on this morning and she would like for you to send her script in right away.

## 2022-10-24 ENCOUNTER — PES CALL (OUTPATIENT)
Dept: ADMINISTRATIVE | Facility: OTHER | Age: 77
End: 2022-10-24
Payer: MEDICARE

## 2022-12-08 ENCOUNTER — PES CALL (OUTPATIENT)
Dept: ADMINISTRATIVE | Facility: CLINIC | Age: 77
End: 2022-12-08
Payer: MEDICARE

## 2023-01-12 ENCOUNTER — OFFICE VISIT (OUTPATIENT)
Dept: INTERNAL MEDICINE | Facility: CLINIC | Age: 78
End: 2023-01-12
Payer: MEDICARE

## 2023-01-12 VITALS
WEIGHT: 155.88 LBS | HEIGHT: 66 IN | SYSTOLIC BLOOD PRESSURE: 136 MMHG | BODY MASS INDEX: 25.05 KG/M2 | DIASTOLIC BLOOD PRESSURE: 70 MMHG | OXYGEN SATURATION: 98 % | HEART RATE: 70 BPM

## 2023-01-12 DIAGNOSIS — F41.9 ANXIETY: ICD-10-CM

## 2023-01-12 DIAGNOSIS — F33.40 RECURRENT MAJOR DEPRESSIVE DISORDER, IN REMISSION: ICD-10-CM

## 2023-01-12 DIAGNOSIS — Z00.00 ENCOUNTER FOR PREVENTIVE HEALTH EXAMINATION: Primary | ICD-10-CM

## 2023-01-12 DIAGNOSIS — E55.9 VITAMIN D DEFICIENCY: ICD-10-CM

## 2023-01-12 DIAGNOSIS — Z78.0 POST-MENOPAUSAL: ICD-10-CM

## 2023-01-12 DIAGNOSIS — I87.2 VENOUS INSUFFICIENCY OF BOTH LOWER EXTREMITIES: ICD-10-CM

## 2023-01-12 DIAGNOSIS — E78.5 HYPERLIPIDEMIA, UNSPECIFIED HYPERLIPIDEMIA TYPE: ICD-10-CM

## 2023-01-12 PROBLEM — Z74.09 DECREASED MOBILITY AND ENDURANCE: Status: RESOLVED | Noted: 2020-06-15 | Resolved: 2023-01-12

## 2023-01-12 PROBLEM — R29.3 POOR POSTURE: Status: RESOLVED | Noted: 2020-06-15 | Resolved: 2023-01-12

## 2023-01-12 PROBLEM — R29.898 DECREASED STRENGTH OF TRUNK AND BACK: Status: RESOLVED | Noted: 2020-06-15 | Resolved: 2023-01-12

## 2023-01-12 PROCEDURE — 1160F PR REVIEW ALL MEDS BY PRESCRIBER/CLIN PHARMACIST DOCUMENTED: ICD-10-PCS | Mod: HCNC,CPTII,S$GLB, | Performed by: NURSE PRACTITIONER

## 2023-01-12 PROCEDURE — 1160F RVW MEDS BY RX/DR IN RCRD: CPT | Mod: HCNC,CPTII,S$GLB, | Performed by: NURSE PRACTITIONER

## 2023-01-12 PROCEDURE — G0439 PPPS, SUBSEQ VISIT: HCPCS | Mod: HCNC,S$GLB,, | Performed by: NURSE PRACTITIONER

## 2023-01-12 PROCEDURE — 3288F FALL RISK ASSESSMENT DOCD: CPT | Mod: HCNC,CPTII,S$GLB, | Performed by: NURSE PRACTITIONER

## 2023-01-12 PROCEDURE — 3078F DIAST BP <80 MM HG: CPT | Mod: HCNC,CPTII,S$GLB, | Performed by: NURSE PRACTITIONER

## 2023-01-12 PROCEDURE — 1170F FXNL STATUS ASSESSED: CPT | Mod: HCNC,CPTII,S$GLB, | Performed by: NURSE PRACTITIONER

## 2023-01-12 PROCEDURE — 1101F PR PT FALLS ASSESS DOC 0-1 FALLS W/OUT INJ PAST YR: ICD-10-PCS | Mod: HCNC,CPTII,S$GLB, | Performed by: NURSE PRACTITIONER

## 2023-01-12 PROCEDURE — 99999 PR PBB SHADOW E&M-EST. PATIENT-LVL V: CPT | Mod: PBBFAC,HCNC,, | Performed by: NURSE PRACTITIONER

## 2023-01-12 PROCEDURE — 1126F AMNT PAIN NOTED NONE PRSNT: CPT | Mod: HCNC,CPTII,S$GLB, | Performed by: NURSE PRACTITIONER

## 2023-01-12 PROCEDURE — 1159F PR MEDICATION LIST DOCUMENTED IN MEDICAL RECORD: ICD-10-PCS | Mod: HCNC,CPTII,S$GLB, | Performed by: NURSE PRACTITIONER

## 2023-01-12 PROCEDURE — 99999 PR PBB SHADOW E&M-EST. PATIENT-LVL V: ICD-10-PCS | Mod: PBBFAC,HCNC,, | Performed by: NURSE PRACTITIONER

## 2023-01-12 PROCEDURE — 3288F PR FALLS RISK ASSESSMENT DOCUMENTED: ICD-10-PCS | Mod: HCNC,CPTII,S$GLB, | Performed by: NURSE PRACTITIONER

## 2023-01-12 PROCEDURE — 3078F PR MOST RECENT DIASTOLIC BLOOD PRESSURE < 80 MM HG: ICD-10-PCS | Mod: HCNC,CPTII,S$GLB, | Performed by: NURSE PRACTITIONER

## 2023-01-12 PROCEDURE — 1159F MED LIST DOCD IN RCRD: CPT | Mod: HCNC,CPTII,S$GLB, | Performed by: NURSE PRACTITIONER

## 2023-01-12 PROCEDURE — G0439 PR MEDICARE ANNUAL WELLNESS SUBSEQUENT VISIT: ICD-10-PCS | Mod: HCNC,S$GLB,, | Performed by: NURSE PRACTITIONER

## 2023-01-12 PROCEDURE — 1101F PT FALLS ASSESS-DOCD LE1/YR: CPT | Mod: HCNC,CPTII,S$GLB, | Performed by: NURSE PRACTITIONER

## 2023-01-12 PROCEDURE — 3075F PR MOST RECENT SYSTOLIC BLOOD PRESS GE 130-139MM HG: ICD-10-PCS | Mod: HCNC,CPTII,S$GLB, | Performed by: NURSE PRACTITIONER

## 2023-01-12 PROCEDURE — 3075F SYST BP GE 130 - 139MM HG: CPT | Mod: HCNC,CPTII,S$GLB, | Performed by: NURSE PRACTITIONER

## 2023-01-12 PROCEDURE — 1126F PR PAIN SEVERITY QUANTIFIED, NO PAIN PRESENT: ICD-10-PCS | Mod: HCNC,CPTII,S$GLB, | Performed by: NURSE PRACTITIONER

## 2023-01-12 PROCEDURE — 1170F PR FUNCTIONAL STATUS ASSESSED: ICD-10-PCS | Mod: HCNC,CPTII,S$GLB, | Performed by: NURSE PRACTITIONER

## 2023-01-12 NOTE — PATIENT INSTRUCTIONS
Counseling and Referral of Other Preventative  (Italic type indicates deductible and co-insurance are waived)    Patient Name: Katiana Hagan  Today's Date: 1/12/2023    Health Maintenance       Date Due Completion Date    Shingles Vaccine (1 of 2) Never done ---    COVID-19 Vaccine (4 - Booster for Pfizer series) 03/01/2022 1/4/2022    DEXA Scan 11/30/2022 11/30/2020    Lipid Panel 02/17/2027 2/17/2022    Colorectal Cancer Screening 09/06/2027 6/17/2020    Override on 9/6/2017: Done (Fit Kit ordered)    TETANUS VACCINE 06/10/2029 6/10/2019    Override on 9/6/2017: Declined        Orders Placed This Encounter   Procedures    DXA Bone Density Spine And Hip     The following information is provided to all patients.  This information is to help you find resources for any of the problems found today that may be affecting your health:                Living healthy guide: www.[facility].[place].[facility]      Understanding Diabetes: www.diabetes.org      Eating healthy: www.cdc.gov/healthyweight      CDC home safety checklist: www.cdc.gov/steadi/patient.html      Agency on Aging: www.goea.[place].[facility]      Alcoholics anonymous (AA): www.aa.org      Physical Activity: www.selam.nih.gov/dg6ydko      Tobacco use: www.quitwithusla.org

## 2023-01-25 ENCOUNTER — TELEPHONE (OUTPATIENT)
Dept: INTERNAL MEDICINE | Facility: CLINIC | Age: 78
End: 2023-01-25
Payer: MEDICARE

## 2023-01-25 NOTE — TELEPHONE ENCOUNTER
Called and spoke with patient, COVID+ Sunday; feels better today than yesterday.    Positive occasional mild nonproductive cough, congestion, sinus or ear pain/pressure, fatigue,  rhinorrhea, intermittent left shoulder/upper back pain (6/10) only with movement (raising arm)    Negative  SOB, CP, sore throat, sinus or ear pain/pressure, fever, myalgia, weakness, headache, chills, weight loss    Alleviating factors- Dayquil and Nightquil, sudafed, (alternating) emergen C, vit D3, centrum 50 plus multivitamin,   Aggravating factors- none    911 precautions discussed. COVID surveillance offered, pt refused.    Pt just wanted to see if there was anything else to do. Advised patient to increase fluid intake, watch ingredients of cold medicine so not doubling dosages of meds, rest, and to let us know if symptoms stop improving or worsen.

## 2023-01-25 NOTE — TELEPHONE ENCOUNTER
Based on improvement and the duration of symptoms I think she is beyond the window of Paxlovid, so continue current treatment plan.

## 2023-01-25 NOTE — TELEPHONE ENCOUNTER
----- Message from Juan Manuel Kennedy sent at 1/25/2023  3:06 PM CST -----  Contact: pt [phone]  [email or web address]     Patient is calling for Medical Advice regarding: Runny nose, sneezing, coughing, and pain in left shoulder, and tiredness    How long has patient had these symptoms: 5 days    Pharmacy name and phone#: [facility] #[id number] - [place] [address] Phone:  [phone]  Fax:  [phone]    She tested postive for covid with at home test on Sunday

## 2023-01-30 ENCOUNTER — TELEPHONE (OUTPATIENT)
Dept: INTERNAL MEDICINE | Facility: CLINIC | Age: 78
End: 2023-01-30
Payer: MEDICARE

## 2023-01-30 NOTE — TELEPHONE ENCOUNTER
----- Message from Kathie Tucker sent at 1/30/2023  4:42 PM CST -----  Contact: [phone] home; [phone]  Type:  Needs Medical Advice    Who Called: Pt  Symptoms (please be specific): negative test result   How long has patient had these symptoms:  n/a  Pharmacy name and phone #:  n/a  Would the patient rather a call back or a response via [facility]? Call back  Best Call Back Number: [phone] home; [phone]   Additional Information: Pt would like to know info. After testing negative for covid. She was informed to follow CDC guidelines but insist on speaking with a nurse.

## 2023-01-31 ENCOUNTER — TELEPHONE (OUTPATIENT)
Dept: INTERNAL MEDICINE | Facility: CLINIC | Age: 78
End: 2023-01-31
Payer: MEDICARE

## 2023-01-31 NOTE — TELEPHONE ENCOUNTER
----- Message from Angella Rosas sent at 1/31/2023 11:49 AM CST -----  Regarding: Questions and concerns  Contact: [phone] or [phone]  Patient Katiana is calling. Patient would like to know once she tested negative for Covid does she still need to be away from people. I informed patient that she should go by the CDC guidelines but she insisted on speaking with Dr Ren nurse. Please call patient at [phone] or [phone]     Thank You

## 2023-02-07 ENCOUNTER — TELEPHONE (OUTPATIENT)
Dept: OBSTETRICS AND GYNECOLOGY | Facility: CLINIC | Age: 78
End: 2023-02-07
Payer: MEDICARE

## 2023-02-07 ENCOUNTER — TELEPHONE (OUTPATIENT)
Dept: INTERNAL MEDICINE | Facility: CLINIC | Age: 78
End: 2023-02-07
Payer: MEDICARE

## 2023-02-07 DIAGNOSIS — Z12.31 SCREENING MAMMOGRAM, ENCOUNTER FOR: Primary | ICD-10-CM

## 2023-02-07 DIAGNOSIS — Z12.39 ENCOUNTER FOR OTHER SCREENING FOR MALIGNANT NEOPLASM OF BREAST: Primary | ICD-10-CM

## 2023-02-07 DIAGNOSIS — Z12.31 ENCOUNTER FOR SCREENING MAMMOGRAM FOR MALIGNANT NEOPLASM OF BREAST: ICD-10-CM

## 2023-02-07 NOTE — TELEPHONE ENCOUNTER
----- Message from Denise Del Rio sent at 2/7/2023  1:18 PM CST -----  Regarding: orders  Contact: [phone]  PT requesting orders for annual Mammo exam. Please call to discuss further.

## 2023-02-09 DIAGNOSIS — Z00.00 ENCOUNTER FOR MEDICARE ANNUAL WELLNESS EXAM: ICD-10-CM

## 2023-02-17 ENCOUNTER — HOSPITAL ENCOUNTER (OUTPATIENT)
Dept: RADIOLOGY | Facility: HOSPITAL | Age: 78
Discharge: HOME | End: 2023-02-17
Attending: OBSTETRICS & GYNECOLOGY
Payer: MEDICARE

## 2023-02-17 DIAGNOSIS — Z12.31 SCREENING MAMMOGRAM, ENCOUNTER FOR: ICD-10-CM

## 2023-02-17 PROCEDURE — 77063 BREAST TOMOSYNTHESIS BI: CPT | Mod: 26,HCNC,, | Performed by: RADIOLOGY

## 2023-02-17 PROCEDURE — 77067 SCR MAMMO BI INCL CAD: CPT | Mod: 26,HCNC,, | Performed by: RADIOLOGY

## 2023-02-17 PROCEDURE — 77067 SCR MAMMO BI INCL CAD: CPT | Mod: TC,HCNC

## 2023-02-17 PROCEDURE — 77063 MAMMO DIGITAL SCREENING BILAT WITH TOMO: ICD-10-PCS | Mod: 26,HCNC,, | Performed by: RADIOLOGY

## 2023-02-17 PROCEDURE — 77067 MAMMO DIGITAL SCREENING BILAT WITH TOMO: ICD-10-PCS | Mod: 26,HCNC,, | Performed by: RADIOLOGY

## 2023-02-27 ENCOUNTER — OFFICE VISIT (OUTPATIENT)
Dept: INTERNAL MEDICINE | Facility: CLINIC | Age: 78
End: 2023-02-27
Payer: MEDICARE

## 2023-02-27 ENCOUNTER — HOSPITAL ENCOUNTER (OUTPATIENT)
Dept: RADIOLOGY | Facility: CLINIC | Age: 78
Discharge: HOME | End: 2023-02-27
Attending: NURSE PRACTITIONER
Payer: MEDICARE

## 2023-02-27 VITALS
DIASTOLIC BLOOD PRESSURE: 80 MMHG | BODY MASS INDEX: 25.44 KG/M2 | SYSTOLIC BLOOD PRESSURE: 128 MMHG | HEART RATE: 67 BPM | OXYGEN SATURATION: 98 % | HEIGHT: 66 IN | WEIGHT: 158.31 LBS

## 2023-02-27 DIAGNOSIS — E78.5 HYPERLIPIDEMIA, UNSPECIFIED HYPERLIPIDEMIA TYPE: ICD-10-CM

## 2023-02-27 DIAGNOSIS — Z78.0 POST-MENOPAUSAL: ICD-10-CM

## 2023-02-27 DIAGNOSIS — Z00.00 ROUTINE PHYSICAL EXAMINATION: Primary | ICD-10-CM

## 2023-02-27 PROCEDURE — 3074F PR MOST RECENT SYSTOLIC BLOOD PRESSURE < 130 MM HG: ICD-10-PCS | Mod: HCNC,CPTII,S$GLB, | Performed by: INTERNAL MEDICINE

## 2023-02-27 PROCEDURE — 1160F PR REVIEW ALL MEDS BY PRESCRIBER/CLIN PHARMACIST DOCUMENTED: ICD-10-PCS | Mod: HCNC,CPTII,S$GLB, | Performed by: INTERNAL MEDICINE

## 2023-02-27 PROCEDURE — 3079F DIAST BP 80-89 MM HG: CPT | Mod: HCNC,CPTII,S$GLB, | Performed by: INTERNAL MEDICINE

## 2023-02-27 PROCEDURE — 3074F SYST BP LT 130 MM HG: CPT | Mod: HCNC,CPTII,S$GLB, | Performed by: INTERNAL MEDICINE

## 2023-02-27 PROCEDURE — 3288F FALL RISK ASSESSMENT DOCD: CPT | Mod: HCNC,CPTII,S$GLB, | Performed by: INTERNAL MEDICINE

## 2023-02-27 PROCEDURE — 99999 PR PBB SHADOW E&M-EST. PATIENT-LVL III: CPT | Mod: PBBFAC,HCNC,, | Performed by: INTERNAL MEDICINE

## 2023-02-27 PROCEDURE — 99999 PR PBB SHADOW E&M-EST. PATIENT-LVL III: ICD-10-PCS | Mod: PBBFAC,HCNC,, | Performed by: INTERNAL MEDICINE

## 2023-02-27 PROCEDURE — 1159F PR MEDICATION LIST DOCUMENTED IN MEDICAL RECORD: ICD-10-PCS | Mod: HCNC,CPTII,S$GLB, | Performed by: INTERNAL MEDICINE

## 2023-02-27 PROCEDURE — 3079F PR MOST RECENT DIASTOLIC BLOOD PRESSURE 80-89 MM HG: ICD-10-PCS | Mod: HCNC,CPTII,S$GLB, | Performed by: INTERNAL MEDICINE

## 2023-02-27 PROCEDURE — 1101F PT FALLS ASSESS-DOCD LE1/YR: CPT | Mod: HCNC,CPTII,S$GLB, | Performed by: INTERNAL MEDICINE

## 2023-02-27 PROCEDURE — 1126F AMNT PAIN NOTED NONE PRSNT: CPT | Mod: HCNC,CPTII,S$GLB, | Performed by: INTERNAL MEDICINE

## 2023-02-27 PROCEDURE — 77080 DXA BONE DENSITY AXIAL: CPT | Mod: 26,HCNC,, | Performed by: INTERNAL MEDICINE

## 2023-02-27 PROCEDURE — 77080 DXA BONE DENSITY AXIAL: CPT | Mod: TC,HCNC

## 2023-02-27 PROCEDURE — 77080 DEXA BONE DENSITY SPINE HIP: ICD-10-PCS | Mod: 26,HCNC,, | Performed by: INTERNAL MEDICINE

## 2023-02-27 PROCEDURE — 1126F PR PAIN SEVERITY QUANTIFIED, NO PAIN PRESENT: ICD-10-PCS | Mod: HCNC,CPTII,S$GLB, | Performed by: INTERNAL MEDICINE

## 2023-02-27 PROCEDURE — 99397 PR PREVENTIVE VISIT,EST,65 & OVER: ICD-10-PCS | Mod: HCNC,S$GLB,, | Performed by: INTERNAL MEDICINE

## 2023-02-27 PROCEDURE — 3288F PR FALLS RISK ASSESSMENT DOCUMENTED: ICD-10-PCS | Mod: HCNC,CPTII,S$GLB, | Performed by: INTERNAL MEDICINE

## 2023-02-27 PROCEDURE — 1101F PR PT FALLS ASSESS DOC 0-1 FALLS W/OUT INJ PAST YR: ICD-10-PCS | Mod: HCNC,CPTII,S$GLB, | Performed by: INTERNAL MEDICINE

## 2023-02-27 PROCEDURE — 1160F RVW MEDS BY RX/DR IN RCRD: CPT | Mod: HCNC,CPTII,S$GLB, | Performed by: INTERNAL MEDICINE

## 2023-02-27 PROCEDURE — 99397 PER PM REEVAL EST PAT 65+ YR: CPT | Mod: HCNC,S$GLB,, | Performed by: INTERNAL MEDICINE

## 2023-02-27 PROCEDURE — 1159F MED LIST DOCD IN RCRD: CPT | Mod: HCNC,CPTII,S$GLB, | Performed by: INTERNAL MEDICINE

## 2023-02-27 NOTE — PROGRESS NOTES
Subjective:       Patient ID: Katiana Hagan is a 77 y.o. female.    Chief Complaint: Annual Exam    Patient here for annual exam and follow-up medical problems.  Overall she is doing well for 77.  She is said mild dyslipidemia and would like to update her blood labs.  She sent in a fit kit, waiting for it to results.  She denies any chest pain shortness of breath fevers or chills.  No cough or wheeze.  No GI or  complaints.    Review of Systems   Constitutional:  Negative for appetite change, chills and fever.   HENT:  Negative for nosebleeds and sore throat.    Eyes:  Positive for visual disturbance. Negative for pain.   Respiratory:  Negative for cough, shortness of breath and wheezing.    Cardiovascular:  Negative for chest pain and leg swelling.   Gastrointestinal:  Negative for abdominal pain, constipation and diarrhea.   Endocrine: Negative for polyuria.   Genitourinary:  Negative for difficulty urinating, hematuria and vaginal bleeding.   Musculoskeletal:  Negative for arthralgias, back pain, gait problem and neck pain.   Integumentary:  Negative for pallor and rash.   Neurological:  Negative for tremors, seizures and headaches.   Hematological:  Does not bruise/bleed easily.   Psychiatric/Behavioral:  Negative for dysphoric mood. The patient is not nervous/anxious.          Past Medical History:   Diagnosis Date    Other and unspecified hyperlipidemia 10/29/2013     Past Surgical History:   Procedure Laterality Date    APPENDECTOMY  age 18    AUGMENTATION OF BREAST Bilateral 1988    Breast augmentation  1988    COSMETIC SURGERY  03/08/2016    facelift    DILATION AND CURETTAGE OF UTERUS      MYOMECTOMY        Patient Active Problem List   Diagnosis    Hyperlipidemia    Venous insufficiency of both lower extremities    Urge incontinence    Nocturia    Uterovaginal prolapse, incomplete    Vitamin D deficiency    Anxiety    BMI 24.0-24.9, adult    Recurrent major depressive disorder, in remission         Objective:      Physical Exam  Constitutional:       General: She is not in acute distress.     Appearance: She is well-developed.   HENT:      Head: Normocephalic and atraumatic.      Right Ear: Tympanic membrane, ear canal and external ear normal.      Left Ear: Tympanic membrane, ear canal and external ear normal.      Mouth/Throat:      Pharynx: No oropharyngeal exudate or posterior oropharyngeal erythema.   Eyes:      General: No scleral icterus.     Conjunctiva/sclera: Conjunctivae normal.      Pupils: Pupils are equal, round, and reactive to light.   Neck:      Thyroid: No thyromegaly.   Cardiovascular:      Rate and Rhythm: Normal rate and regular rhythm.      Pulses: Normal pulses.      Heart sounds: No murmur heard.  Pulmonary:      Effort: Pulmonary effort is normal.      Breath sounds: Normal breath sounds. No wheezing.   Abdominal:      General: Bowel sounds are normal. There is no distension.      Palpations: Abdomen is soft.      Tenderness: There is no abdominal tenderness.   Musculoskeletal:         General: No tenderness.      Cervical back: Normal range of motion and neck supple.      Right lower leg: No edema.      Left lower leg: No edema.   Lymphadenopathy:      Cervical: No cervical adenopathy.   Skin:     Coloration: Skin is not jaundiced.      Findings: No rash.   Neurological:      General: No focal deficit present.      Mental Status: She is alert and oriented to person, place, and time.   Psychiatric:         Mood and Affect: Mood normal.         Behavior: Behavior normal.       Assessment:       Problem List Items Addressed This Visit          Cardiac/Vascular    Hyperlipidemia    Relevant Orders    CBC Auto Differential    Basic Metabolic Panel    Lipid Panel    TSH     Other Visit Diagnoses       Routine physical examination    -  Primary    Relevant Orders    CBC Auto Differential    Basic Metabolic Panel    Lipid Panel    TSH            Plan:         Katiana was seen today for  "annual exam.    Diagnoses and all orders for this visit:    Routine physical examination  -     CBC Auto Differential; Future  -     Basic Metabolic Panel; Future  -     Lipid Panel; Future  -     TSH; Future    Hyperlipidemia, unspecified hyperlipidemia type  -     CBC Auto Differential; Future  -     Basic Metabolic Panel; Future  -     Lipid Panel; Future  -     TSH; Future           Review all labs          Portions of this note may have been created with voice recognition software. Occasional "wrong-word" or "sound-a-like" substitutions may have occurred due to the inherent limitations of voice recognition software. Please, read the note carefully and recognize, using context, where substitutions have occurred.  "

## 2023-02-28 ENCOUNTER — LAB VISIT (OUTPATIENT)
Dept: LAB | Facility: HOSPITAL | Age: 78
End: 2023-02-28
Attending: INTERNAL MEDICINE
Payer: MEDICARE

## 2023-02-28 DIAGNOSIS — Z12.11 ENCOUNTER FOR FIT (FECAL IMMUNOCHEMICAL TEST) SCREENING: ICD-10-CM

## 2023-02-28 LAB — HEMOCCULT STL QL IA: NEGATIVE

## 2023-02-28 PROCEDURE — 82274 ASSAY TEST FOR BLOOD FECAL: CPT | Mod: HCNC | Performed by: INTERNAL MEDICINE

## 2023-03-06 ENCOUNTER — PATIENT MESSAGE (OUTPATIENT)
Dept: INTERNAL MEDICINE | Facility: CLINIC | Age: 78
End: 2023-03-06
Payer: MEDICARE

## 2023-03-07 ENCOUNTER — PATIENT MESSAGE (OUTPATIENT)
Dept: INTERNAL MEDICINE | Facility: CLINIC | Age: 78
End: 2023-03-07
Payer: MEDICARE

## 2023-03-07 DIAGNOSIS — E78.5 HYPERLIPIDEMIA, UNSPECIFIED HYPERLIPIDEMIA TYPE: Primary | ICD-10-CM

## 2023-03-08 NOTE — TELEPHONE ENCOUNTER
Spoke with patient to relay message from PCP related to labwork  Educated patient on  low cholesterol diet   Patient verbalized an understanding    Patient declined to scheduled repeat labwork at this time

## 2023-03-08 NOTE — TELEPHONE ENCOUNTER
Can we call her. Not fasting just for the 1 days would not account for a 30+ point jump. More likely something over months.   Can tiday this up and repeat a simple fasting cholesterol in 3-4 months if she likes.

## 2023-03-16 ENCOUNTER — OFFICE VISIT (OUTPATIENT)
Dept: OBSTETRICS AND GYNECOLOGY | Facility: CLINIC | Age: 78
End: 2023-03-16
Attending: OBSTETRICS & GYNECOLOGY
Payer: MEDICARE

## 2023-03-16 VITALS
HEIGHT: 66 IN | SYSTOLIC BLOOD PRESSURE: 131 MMHG | BODY MASS INDEX: 25.39 KG/M2 | DIASTOLIC BLOOD PRESSURE: 77 MMHG | WEIGHT: 158 LBS

## 2023-03-16 DIAGNOSIS — Z01.411 ENCOUNTER FOR GYNECOLOGICAL EXAMINATION WITH ABNORMAL FINDING: Primary | ICD-10-CM

## 2023-03-16 DIAGNOSIS — N95.2 POSTMENOPAUSAL ATROPHIC VAGINITIS: ICD-10-CM

## 2023-03-16 DIAGNOSIS — N76.0 VULVOVAGINITIS: ICD-10-CM

## 2023-03-16 DIAGNOSIS — N39.41 URGE INCONTINENCE: ICD-10-CM

## 2023-03-16 PROCEDURE — 3288F PR FALLS RISK ASSESSMENT DOCUMENTED: ICD-10-PCS | Mod: HCNC,CPTII,S$GLB, | Performed by: OBSTETRICS & GYNECOLOGY

## 2023-03-16 PROCEDURE — 1101F PT FALLS ASSESS-DOCD LE1/YR: CPT | Mod: HCNC,CPTII,S$GLB, | Performed by: OBSTETRICS & GYNECOLOGY

## 2023-03-16 PROCEDURE — 1159F PR MEDICATION LIST DOCUMENTED IN MEDICAL RECORD: ICD-10-PCS | Mod: HCNC,CPTII,S$GLB, | Performed by: OBSTETRICS & GYNECOLOGY

## 2023-03-16 PROCEDURE — 3288F FALL RISK ASSESSMENT DOCD: CPT | Mod: HCNC,CPTII,S$GLB, | Performed by: OBSTETRICS & GYNECOLOGY

## 2023-03-16 PROCEDURE — G0101 CA SCREEN;PELVIC/BREAST EXAM: HCPCS | Mod: HCNC,S$GLB,, | Performed by: OBSTETRICS & GYNECOLOGY

## 2023-03-16 PROCEDURE — 81514 NFCT DS BV&VAGINITIS DNA ALG: CPT | Mod: HCNC | Performed by: OBSTETRICS & GYNECOLOGY

## 2023-03-16 PROCEDURE — 1159F MED LIST DOCD IN RCRD: CPT | Mod: HCNC,CPTII,S$GLB, | Performed by: OBSTETRICS & GYNECOLOGY

## 2023-03-16 PROCEDURE — 99999 PR PBB SHADOW E&M-EST. PATIENT-LVL III: ICD-10-PCS | Mod: PBBFAC,HCNC,, | Performed by: OBSTETRICS & GYNECOLOGY

## 2023-03-16 PROCEDURE — 3078F PR MOST RECENT DIASTOLIC BLOOD PRESSURE < 80 MM HG: ICD-10-PCS | Mod: HCNC,CPTII,S$GLB, | Performed by: OBSTETRICS & GYNECOLOGY

## 2023-03-16 PROCEDURE — 3075F SYST BP GE 130 - 139MM HG: CPT | Mod: HCNC,CPTII,S$GLB, | Performed by: OBSTETRICS & GYNECOLOGY

## 2023-03-16 PROCEDURE — 1126F PR PAIN SEVERITY QUANTIFIED, NO PAIN PRESENT: ICD-10-PCS | Mod: HCNC,CPTII,S$GLB, | Performed by: OBSTETRICS & GYNECOLOGY

## 2023-03-16 PROCEDURE — 1126F AMNT PAIN NOTED NONE PRSNT: CPT | Mod: HCNC,CPTII,S$GLB, | Performed by: OBSTETRICS & GYNECOLOGY

## 2023-03-16 PROCEDURE — 99999 PR PBB SHADOW E&M-EST. PATIENT-LVL III: CPT | Mod: PBBFAC,HCNC,, | Performed by: OBSTETRICS & GYNECOLOGY

## 2023-03-16 PROCEDURE — 3078F DIAST BP <80 MM HG: CPT | Mod: HCNC,CPTII,S$GLB, | Performed by: OBSTETRICS & GYNECOLOGY

## 2023-03-16 PROCEDURE — G0101 PR CA SCREEN;PELVIC/BREAST EXAM: ICD-10-PCS | Mod: HCNC,S$GLB,, | Performed by: OBSTETRICS & GYNECOLOGY

## 2023-03-16 PROCEDURE — 3075F PR MOST RECENT SYSTOLIC BLOOD PRESS GE 130-139MM HG: ICD-10-PCS | Mod: HCNC,CPTII,S$GLB, | Performed by: OBSTETRICS & GYNECOLOGY

## 2023-03-16 PROCEDURE — 1101F PR PT FALLS ASSESS DOC 0-1 FALLS W/OUT INJ PAST YR: ICD-10-PCS | Mod: HCNC,CPTII,S$GLB, | Performed by: OBSTETRICS & GYNECOLOGY

## 2023-03-16 RX ORDER — CONJUGATED ESTROGENS 0.62 MG/G
CREAM VAGINAL
Qty: 60 G | Refills: 4 | Status: SHIPPED | OUTPATIENT
Start: 2023-03-16 | End: 2024-03-25

## 2023-03-16 RX ORDER — TRETINOIN 0.5 MG/G
CREAM TOPICAL
COMMUNITY
Start: 2022-11-23

## 2023-03-16 RX ORDER — FLUCONAZOLE 150 MG/1
150 TABLET ORAL ONCE
Qty: 2 TABLET | Refills: 0 | Status: SHIPPED | OUTPATIENT
Start: 2023-03-16 | End: 2023-03-16

## 2023-03-16 RX ORDER — CLOTRIMAZOLE AND BETAMETHASONE DIPROPIONATE 10; .64 MG/G; MG/G
CREAM TOPICAL
Qty: 15 G | Refills: 1 | Status: SHIPPED | OUTPATIENT
Start: 2023-03-16 | End: 2023-06-23

## 2023-03-16 NOTE — PROGRESS NOTES
Subjective:       Patient ID: Katiana Hagan is a 77 y.o. female.    Chief Complaint:  Annual Exam, vaginal bumps, and urge incontinence\      History of Present Illness  HPI    Katiana Hagan is a 77 y.o. female  here for a GYN exam for evaluation of vulvar irritation which has been occurring for the past 2 months. She develops bumps in the groin which she rubs off but they come back. She has also been somewhat moist due to having urge incontinence, has trouble holding her urine once the urge occurs. .    She is menopausal since age 52.  denies break through bleeding.   reports vaginal itching or irritation.  Denies vaginal discharge.  She is not sexually active.   History of abnormal pap: No  Last Pap: approximate date 2016 and was normal  Last MMG: normal-2023-routine follow-up in 12 months  Last Colonoscopy:  FITT kit only      Past Medical History:   Diagnosis Date    Other and unspecified hyperlipidemia 10/29/2013     Past Surgical History:   Procedure Laterality Date    APPENDECTOMY  age 18    AUGMENTATION OF BREAST Bilateral     Breast augmentation      COSMETIC SURGERY  2016    facelift    DILATION AND CURETTAGE OF UTERUS      MYOMECTOMY       Social History     Socioeconomic History    Marital status:    Tobacco Use    Smoking status: Never    Smokeless tobacco: Never   Substance and Sexual Activity    Alcohol use: Yes     Alcohol/week: 1.0 standard drink     Types: 1 Drinks containing 0.5 oz of alcohol per week     Comment: maybe 1 glass of liquer weekly    Drug use: No    Sexual activity: Not Currently     Partners: Male     Birth control/protection: Post-menopausal     Comment:  2015     Social Determinants of Health     Financial Resource Strain: Low Risk     Difficulty of Paying Living Expenses: Not hard at all   Food Insecurity: No Food Insecurity    Worried About Running Out of Food in the Last Year: Never true    Ran Out of Food in the  "Last Year: Never true   Transportation Needs: No Transportation Needs    Lack of Transportation (Medical): No    Lack of Transportation (Non-Medical): No   Physical Activity: Inactive    Days of Exercise per Week: 0 days    Minutes of Exercise per Session: 0 min   Stress: No Stress Concern Present    Feeling of Stress : Not at all   Social Connections: Socially Isolated    Frequency of Communication with Friends and Family: Once a week    Frequency of Social Gatherings with Friends and Family: Once a week    Attends Confucianist Services: Never    Active Member of Clubs or Organizations: No    Attends Club or Organization Meetings: Never    Marital Status:    Housing Stability: Unknown    Unable to Pay for Housing in the Last Year: No    Unstable Housing in the Last Year: No     Family History   Problem Relation Age of Onset    Breast cancer Maternal Aunt     No Known Problems Father     No Known Problems Mother     Diabetes Sister     Schizophrenia Son     Heart disease Sister     No Known Problems Son     Colon cancer Neg Hx     Hypertension Neg Hx     Ovarian cancer Neg Hx     Stroke Neg Hx      OB History          2    Para   2    Term   2            AB        Living   2         SAB        IAB        Ectopic        Multiple        Live Births   2                 /77   Ht 5' 6" (1.676 m)   Wt 71.7 kg (158 lb)   LMP 1998 (Approximate)   BMI 25.50 kg/m²         GYN & OB History  Patient's last menstrual period was 1998 (approximate).   Date of Last Pap: No result found    OB History    Para Term  AB Living   2 2 2     2   SAB IAB Ectopic Multiple Live Births           2      # Outcome Date GA Lbr Curt/2nd Weight Sex Delivery Anes PTL Lv   2 Term      Vag-Spont  N BARBY   1 Term      Vag-Spont  N BARBY       Review of Systems  Review of Systems   Constitutional:  Negative for activity change, appetite change, fatigue and unexpected weight change.   HENT: Negative.   "   Eyes:  Negative for visual disturbance.   Respiratory:  Negative for shortness of breath and wheezing.    Cardiovascular:  Negative for chest pain, palpitations and leg swelling.   Gastrointestinal:  Negative for abdominal pain, bloating and blood in stool.   Endocrine: Negative for diabetes and hair loss.   Genitourinary:  Positive for bladder incontinence and vaginal dryness. Negative for decreased libido and dyspareunia.   Musculoskeletal:  Negative for back pain and joint swelling.   Integumentary:  Negative for acne, hair changes and nipple discharge.   Neurological:  Negative for headaches.   Hematological:  Does not bruise/bleed easily.   Psychiatric/Behavioral:  Negative for depression and sleep disturbance. The patient is not nervous/anxious.    Breast: Negative for mastodynia and nipple discharge        Objective:      Physical Exam:   Constitutional: She is oriented to person, place, and time. She appears well-developed and well-nourished.    HENT:   Head: Normocephalic and atraumatic.    Eyes: Pupils are equal, round, and reactive to light. EOM are normal.     Cardiovascular:  Normal rate and regular rhythm.             Pulmonary/Chest: Effort normal and breath sounds normal.   BREASTS:  no mass, no tenderness, no deformity and no retraction. Right breast exhibits no inverted nipple, no mass, no nipple discharge, no skin change, no tenderness, no bleeding and no swelling. Left breast exhibits no inverted nipple, no mass, no nipple discharge, no skin change, no tenderness, no bleeding and no swelling. Breasts are symmetrical.      Implants encapsulated bilaterally        Abdominal: Soft. Bowel sounds are normal.     Genitourinary:          Pelvic exam was performed with patient supine.      Genitourinary Comments: PELVIC: Normal external genitalia without lesions.  Normal hair distribution.  Adequate perineal body, normal urethral meatus.  Vagina  Dry and poorly rugated, atrophic, without lesions or  discharge.  Cervix pink, Parous, Patulous,without lesions, discharge or tenderness.  No significant cystocele or rectocele.  Bimanual exam shows uterus to be normal size, regular, mobile and nontender.  Adnexa without masses or tenderness.    RECTAL:Deferred                 Musculoskeletal: Normal range of motion and moves all extremeties.       Neurological: She is alert and oriented to person, place, and time.    Skin: Skin is warm and dry.    Psychiatric: She has a normal mood and affect.            Assessment:        1. Encounter for gynecological examination with abnormal finding    2. Postmenopausal atrophic vaginitis    3. Vulvovaginitis    4. Urge incontinence                Plan:        Problem List Items Addressed This Visit       Urge incontinence    Relevant Orders    Ambulatory referral/consult to Urogynecology     Other Visit Diagnoses       Encounter for gynecological examination with abnormal finding    -  Primary    Postmenopausal atrophic vaginitis        Relevant Medications    conjugated estrogens (PREMARIN) vaginal cream    Vulvovaginitis        Relevant Medications    fluconazole (DIFLUCAN) 150 MG Tab    clotrimazole-betamethasone 1-0.05% (LOTRISONE) cream    Other Relevant Orders    Vaginosis Screen by DNA Probe             Follow up in about 2 years (around 3/16/2025).

## 2023-03-19 LAB
BACTERIAL VAGINOSIS DNA: NEGATIVE
CANDIDA GLABRATA DNA: NEGATIVE
CANDIDA KRUSEI DNA: NEGATIVE
CANDIDA RRNA VAG QL PROBE: POSITIVE
T VAGINALIS RRNA GENITAL QL PROBE: NEGATIVE

## 2023-06-23 ENCOUNTER — OFFICE VISIT (OUTPATIENT)
Dept: INTERNAL MEDICINE | Facility: CLINIC | Age: 78
End: 2023-06-23
Payer: MEDICARE

## 2023-06-23 VITALS
WEIGHT: 158.31 LBS | HEART RATE: 78 BPM | BODY MASS INDEX: 25.44 KG/M2 | DIASTOLIC BLOOD PRESSURE: 80 MMHG | OXYGEN SATURATION: 98 % | HEIGHT: 66 IN | SYSTOLIC BLOOD PRESSURE: 126 MMHG

## 2023-06-23 DIAGNOSIS — M25.561 RIGHT MEDIAL KNEE PAIN: Primary | ICD-10-CM

## 2023-06-23 DIAGNOSIS — M21.619 BUNION: ICD-10-CM

## 2023-06-23 PROCEDURE — 1125F PR PAIN SEVERITY QUANTIFIED, PAIN PRESENT: ICD-10-PCS | Mod: HCNC,CPTII,S$GLB, | Performed by: INTERNAL MEDICINE

## 2023-06-23 PROCEDURE — 1159F PR MEDICATION LIST DOCUMENTED IN MEDICAL RECORD: ICD-10-PCS | Mod: HCNC,CPTII,S$GLB, | Performed by: INTERNAL MEDICINE

## 2023-06-23 PROCEDURE — 1101F PR PT FALLS ASSESS DOC 0-1 FALLS W/OUT INJ PAST YR: ICD-10-PCS | Mod: HCNC,CPTII,S$GLB, | Performed by: INTERNAL MEDICINE

## 2023-06-23 PROCEDURE — 99214 OFFICE O/P EST MOD 30 MIN: CPT | Mod: HCNC,S$GLB,, | Performed by: INTERNAL MEDICINE

## 2023-06-23 PROCEDURE — 99214 PR OFFICE/OUTPT VISIT, EST, LEVL IV, 30-39 MIN: ICD-10-PCS | Mod: HCNC,S$GLB,, | Performed by: INTERNAL MEDICINE

## 2023-06-23 PROCEDURE — 1125F AMNT PAIN NOTED PAIN PRSNT: CPT | Mod: HCNC,CPTII,S$GLB, | Performed by: INTERNAL MEDICINE

## 2023-06-23 PROCEDURE — 3079F PR MOST RECENT DIASTOLIC BLOOD PRESSURE 80-89 MM HG: ICD-10-PCS | Mod: HCNC,CPTII,S$GLB, | Performed by: INTERNAL MEDICINE

## 2023-06-23 PROCEDURE — 3074F PR MOST RECENT SYSTOLIC BLOOD PRESSURE < 130 MM HG: ICD-10-PCS | Mod: HCNC,CPTII,S$GLB, | Performed by: INTERNAL MEDICINE

## 2023-06-23 PROCEDURE — 1159F MED LIST DOCD IN RCRD: CPT | Mod: HCNC,CPTII,S$GLB, | Performed by: INTERNAL MEDICINE

## 2023-06-23 PROCEDURE — 99999 PR PBB SHADOW E&M-EST. PATIENT-LVL IV: CPT | Mod: PBBFAC,HCNC,, | Performed by: INTERNAL MEDICINE

## 2023-06-23 PROCEDURE — 3074F SYST BP LT 130 MM HG: CPT | Mod: HCNC,CPTII,S$GLB, | Performed by: INTERNAL MEDICINE

## 2023-06-23 PROCEDURE — 3288F FALL RISK ASSESSMENT DOCD: CPT | Mod: HCNC,CPTII,S$GLB, | Performed by: INTERNAL MEDICINE

## 2023-06-23 PROCEDURE — 99999 PR PBB SHADOW E&M-EST. PATIENT-LVL IV: ICD-10-PCS | Mod: PBBFAC,HCNC,, | Performed by: INTERNAL MEDICINE

## 2023-06-23 PROCEDURE — 3288F PR FALLS RISK ASSESSMENT DOCUMENTED: ICD-10-PCS | Mod: HCNC,CPTII,S$GLB, | Performed by: INTERNAL MEDICINE

## 2023-06-23 PROCEDURE — 1101F PT FALLS ASSESS-DOCD LE1/YR: CPT | Mod: HCNC,CPTII,S$GLB, | Performed by: INTERNAL MEDICINE

## 2023-06-23 PROCEDURE — 3079F DIAST BP 80-89 MM HG: CPT | Mod: HCNC,CPTII,S$GLB, | Performed by: INTERNAL MEDICINE

## 2023-06-23 RX ORDER — FLAXSEED OIL 1000 MG
1 CAPSULE ORAL 2 TIMES DAILY
COMMUNITY
Start: 2023-06-23 | End: 2024-02-26

## 2023-06-23 RX ORDER — MELOXICAM 15 MG/1
15 TABLET ORAL DAILY PRN
Qty: 30 TABLET | Refills: 0 | Status: SHIPPED | OUTPATIENT
Start: 2023-06-23 | End: 2023-08-16 | Stop reason: SDUPTHER

## 2023-06-23 NOTE — PROGRESS NOTES
INTERNAL MEDICINE CLINIC - SAME DAY APPOINTMENT  Progress Note    PRESENTING HISTORY     PCP: Tucker Ren MD    Chief Complaint/Reason for Visit:     Chief Complaint   Patient presents with    Knee Pain     R knee pain x 3 weeks     History of Present Illness & ROS : Ms. Katiana Hagan is a 77 y.o. female.      Pain to right medial knee area on and off for 3 weeks.  No trauma.  She has started dancing recently.  No joint welling. Walking well without issues.    PAST HISTORY:     Past Medical History:   Diagnosis Date    Anxiety 8/13/2019    Other and unspecified hyperlipidemia 10/29/2013    Recurrent major depressive disorder, in remission 11/30/2020    Urge incontinence 2/9/2017    Uterovaginal prolapse, incomplete 2/9/2017    Venous insufficiency of both lower extremities     Vitamin D deficiency 9/6/2017       Past Surgical History:   Procedure Laterality Date    APPENDECTOMY  age 18    AUGMENTATION OF BREAST Bilateral 1988    Breast augmentation  1988    COSMETIC SURGERY  03/08/2016    facelift    DILATION AND CURETTAGE OF UTERUS      MYOMECTOMY         Family History   Problem Relation Age of Onset    Breast cancer Maternal Aunt     No Known Problems Father     No Known Problems Mother     Diabetes Sister     Schizophrenia Son     Heart disease Sister     No Known Problems Son     Colon cancer Neg Hx     Hypertension Neg Hx     Ovarian cancer Neg Hx     Stroke Neg Hx        Social History     Socioeconomic History    Marital status:    Tobacco Use    Smoking status: Never    Smokeless tobacco: Never   Substance and Sexual Activity    Alcohol use: Yes     Alcohol/week: 1.0 standard drink     Types: 1 Drinks containing 0.5 oz of alcohol per week     Comment: maybe 1 glass of liquer weekly    Drug use: No    Sexual activity: Not Currently     Partners: Male     Birth control/protection: Post-menopausal     Comment:  2015     Social Determinants of Health     Financial Resource Strain: Low  Risk     Difficulty of Paying Living Expenses: Not hard at all   Food Insecurity: No Food Insecurity    Worried About Running Out of Food in the Last Year: Never true    Ran Out of Food in the Last Year: Never true   Transportation Needs: No Transportation Needs    Lack of Transportation (Medical): No    Lack of Transportation (Non-Medical): No   Physical Activity: Inactive    Days of Exercise per Week: 0 days    Minutes of Exercise per Session: 0 min   Stress: No Stress Concern Present    Feeling of Stress : Not at all   Social Connections: Socially Isolated    Frequency of Communication with Friends and Family: Once a week    Frequency of Social Gatherings with Friends and Family: Once a week    Attends Orthodoxy Services: Never    Active Member of Clubs or Organizations: No    Attends Club or Organization Meetings: Never    Marital Status:    Housing Stability: Unknown    Unable to Pay for Housing in the Last Year: No    Unstable Housing in the Last Year: No       MEDICATIONS & ALLERGIES:     Current Outpatient Medications on File Prior to Visit   Medication Sig Dispense Refill    calcium-vitamin D3-vitamin K 500-500-40 mg-unit-mcg Chew Take 1 tablet by mouth once daily.      cartilage/collagen/bor/hyalur (JOINT HEALTH ORAL) Take by mouth.      Chol/Gl/Ser/RNA/Phen/Prg/Hb150 (SHARPER FOCUS ORAL) Take by mouth.      cholecalciferol, vitamin D3, 2,000 unit Cap Take 1,000 Units by mouth once daily.      conjugated estrogens (PREMARIN) vaginal cream PLACE 0.5 GRAMS IN THE VAGINA THREE TIMES A WEEK AS DIRECTED 60 g 4    LACTOBACILLUS ACIDOPHILUS (PROBIOTIC ORAL) Take by mouth.      MV-MN/VITC/ASBNA/GLU/KWAME/ (AIRBORNE, ASCORBATE SODIUM, ORAL) Take by mouth.      NON FORMULARY MEDICATION Daily. Nutrafol      tretinoin (RETIN-A) 0.05 % cream APPLY PEA SIZED DROP TO FACE NIGHTLY. WASH OFF INNTHE MORNING. SFP ALWAYS      TURMERIC, BULK, MISC Take 1,000 mg by mouth once daily.         Review of patient's  allergies indicates:   Allergen Reactions    Acrylates/beheneth-25 methacrylate copolymer      Fingers get red and inflammed (fake nails)    Hydrocortisone Itching    Methylprednisolone aceponate     Prednisone Nausea And Vomiting and Itching    Tixocortol pivalate Itching       Medications Reconciliation:   I have reconciled the patient's home medications with the patient/family. I have updated all changes.  Refer to After-Visit Medication List.    OBJECTIVE:     Vital Signs:  Vitals:    06/23/23 1503   BP: 126/80   Pulse: 78     Wt Readings from Last 3 Encounters:   06/23/23 1503 71.8 kg (158 lb 4.6 oz)   03/16/23 1444 71.7 kg (158 lb)   02/27/23 1405 71.8 kg (158 lb 4.6 oz)     Body mass index is 25.55 kg/m².     Physical Exam:  General: Well developed, well nourished. No distress.  HEENT: Head is normocephalic, atraumatic; ears are normal.    Eyes: Clear conjunctiva.  Neck: Supple, symmetrical neck; trachea midline.  Lungs: Clear to auscultation bilaterally and normal respiratory effort.  Cardiovascular: Heart with regular rate and rhythm.    Extremities: No LE edema. Bilateral moderate varicose veins up to thigh.  Right knee- no joint effusion. Full range of motion. Mild tenderness to the medial area on palpation.   No bruise.  Psychiatric: Normal affect. Alert.    Laboratory  Lab Results   Component Value Date    WBC 6.65 02/27/2023    HGB 15.0 02/27/2023    HCT 48.4 02/27/2023     02/27/2023    CHOL 234 (H) 02/27/2023    TRIG 187 (H) 02/27/2023    HDL 53 02/27/2023    ALT 25 08/12/2021    AST 20 08/12/2021     02/27/2023    K 4.6 02/27/2023     02/27/2023    CREATININE 0.8 02/27/2023    BUN 17 02/27/2023    CO2 28 02/27/2023    TSH 1.142 02/27/2023    INR 0.9 12/15/2008     ASSESSMENT & PLAN:     Right medial knee pain  -  Likely patellofemoral syndrome.     Plan:  -     Ambulatory referral/consult to Sports Medicine; Future; Expected date: 06/30/2023  -     meloxicam (MOBIC) 15 MG tablet;  Take 1 tablet (15 mg total) by mouth daily as needed for Pain (Take with food).  Dispense: 30 tablet; Refill: 0    -     glucosamine avila 2KCl-chondroit 750-600 mg Tab; Take 1 tablet by mouth 2 (two) times a day. Osteo Biflex for osteoarthritis.    Bunion  -     Ambulatory referral/consult to Podiatry; Future; Expected date: 06/30/2023    Scheduled Follow-up :  No future appointments.    After Visit Medication List :     Medication List            Accurate as of June 23, 2023  3:18 PM. If you have any questions, ask your nurse or doctor.                START taking these medications      glucosamine avila 2KCl-chondroit 750-600 mg Tab  Take 1 tablet by mouth 2 (two) times a day. Osteo Biflex  Started by: Dajuan Alas MD     meloxicam 15 MG tablet  Commonly known as: MOBIC  Take 1 tablet (15 mg total) by mouth daily as needed for Pain (Take with food).  Started by: Dajuan Alas MD            CONTINUE taking these medications      AIRBORNE (ASCORBATE SODIUM) ORAL     calcium-vitamin D3-vitamin K 500-500-40 mg-unit-mcg Chew     cholecalciferol (vitamin D3) 50 mcg (2,000 unit) Cap capsule  Commonly known as: VITAMIN D3     JOINT HEALTH ORAL     NON FORMULARY MEDICATION     PREMARIN vaginal cream  Generic drug: conjugated estrogens  PLACE 0.5 GRAMS IN THE VAGINA THREE TIMES A WEEK AS DIRECTED     PROBIOTIC ORAL     SHARPER FOCUS ORAL     tretinoin 0.05 % cream  Commonly known as: RETIN-A     TURMERIC (BULK) MISC            STOP taking these medications      clotrimazole-betamethasone 1-0.05% cream  Commonly known as: LOTRISONE  Stopped by: Dajuan Alas MD               Where to Get Your Medications        These medications were sent to [id number] - [place], [place] - [address]  [id number] [address]      Hours: 24-hours Phone: [phone]   meloxicam 15 MG tablet       You can get these medications from any pharmacy    You don't need  a prescription for these medications  glucosamine avila 2KCl-chondroit 750-600 mg Tab         Signing Physician:  Dajuan Alas MD

## 2023-07-03 ENCOUNTER — TELEPHONE (OUTPATIENT)
Dept: INTERNAL MEDICINE | Facility: CLINIC | Age: 78
End: 2023-07-03
Payer: MEDICARE

## 2023-07-03 NOTE — TELEPHONE ENCOUNTER
----- Message from Alia Landeros sent at 7/3/2023 10:18 AM CDT -----  Contact: Self/[phone]  [email or web address]     Patient is calling for Medical Advice regarding: Question about the dosage recommended for the glucosamine avila 2KCl-chondroit 750-600 mg Tab    How long has patient had these symptoms:n/a    Pharmacy name and phone#:-    Would like response via "[employer]t:  No     Comments: pt wants to know if she can take the medication once a day for the strength that she was able to find

## 2023-07-03 NOTE — TELEPHONE ENCOUNTER
What does the recommended dose or instruction on the bottle say?   If she cannot tell, can she send us a photo of the bottle or box instructions?

## 2023-07-05 DIAGNOSIS — M25.561 RIGHT KNEE PAIN, UNSPECIFIED CHRONICITY: Primary | ICD-10-CM

## 2023-07-07 ENCOUNTER — HOSPITAL ENCOUNTER (OUTPATIENT)
Dept: RADIOLOGY | Facility: HOSPITAL | Age: 78
Discharge: HOME | End: 2023-07-07
Attending: ORTHOPAEDIC SURGERY
Payer: MEDICARE

## 2023-07-07 ENCOUNTER — OFFICE VISIT (OUTPATIENT)
Dept: ORTHOPEDICS | Facility: CLINIC | Age: 78
End: 2023-07-07
Payer: MEDICARE

## 2023-07-07 VITALS — HEIGHT: 66 IN | BODY MASS INDEX: 24.93 KG/M2 | WEIGHT: 155.13 LBS

## 2023-07-07 DIAGNOSIS — M25.561 RIGHT KNEE PAIN, UNSPECIFIED CHRONICITY: ICD-10-CM

## 2023-07-07 DIAGNOSIS — M25.561 RIGHT MEDIAL KNEE PAIN: ICD-10-CM

## 2023-07-07 DIAGNOSIS — M17.11 PRIMARY OSTEOARTHRITIS OF RIGHT KNEE: ICD-10-CM

## 2023-07-07 DIAGNOSIS — M20.12 VALGUS DEFORMITY OF BOTH GREAT TOES: Primary | ICD-10-CM

## 2023-07-07 DIAGNOSIS — M20.11 VALGUS DEFORMITY OF BOTH GREAT TOES: Primary | ICD-10-CM

## 2023-07-07 PROCEDURE — 1101F PT FALLS ASSESS-DOCD LE1/YR: CPT | Mod: HCNC,CPTII,S$GLB, | Performed by: ORTHOPAEDIC SURGERY

## 2023-07-07 PROCEDURE — 1160F RVW MEDS BY RX/DR IN RCRD: CPT | Mod: HCNC,CPTII,S$GLB, | Performed by: ORTHOPAEDIC SURGERY

## 2023-07-07 PROCEDURE — 1101F PR PT FALLS ASSESS DOC 0-1 FALLS W/OUT INJ PAST YR: ICD-10-PCS | Mod: HCNC,CPTII,S$GLB, | Performed by: ORTHOPAEDIC SURGERY

## 2023-07-07 PROCEDURE — 3288F PR FALLS RISK ASSESSMENT DOCUMENTED: ICD-10-PCS | Mod: HCNC,CPTII,S$GLB, | Performed by: ORTHOPAEDIC SURGERY

## 2023-07-07 PROCEDURE — 1159F PR MEDICATION LIST DOCUMENTED IN MEDICAL RECORD: ICD-10-PCS | Mod: HCNC,CPTII,S$GLB, | Performed by: ORTHOPAEDIC SURGERY

## 2023-07-07 PROCEDURE — 99999 PR PBB SHADOW E&M-EST. PATIENT-LVL IV: CPT | Mod: PBBFAC,HCNC,, | Performed by: ORTHOPAEDIC SURGERY

## 2023-07-07 PROCEDURE — 1159F MED LIST DOCD IN RCRD: CPT | Mod: HCNC,CPTII,S$GLB, | Performed by: ORTHOPAEDIC SURGERY

## 2023-07-07 PROCEDURE — 73562 XR KNEE ORTHO RIGHT WITH FLEXION: ICD-10-PCS | Mod: 26,HCNC,LT, | Performed by: RADIOLOGY

## 2023-07-07 PROCEDURE — 73562 X-RAY EXAM OF KNEE 3: CPT | Mod: 26,HCNC,LT, | Performed by: RADIOLOGY

## 2023-07-07 PROCEDURE — 73564 X-RAY EXAM KNEE 4 OR MORE: CPT | Mod: TC,HCNC,RT

## 2023-07-07 PROCEDURE — 99999 PR PBB SHADOW E&M-EST. PATIENT-LVL IV: ICD-10-PCS | Mod: PBBFAC,HCNC,, | Performed by: ORTHOPAEDIC SURGERY

## 2023-07-07 PROCEDURE — 73564 X-RAY EXAM KNEE 4 OR MORE: CPT | Mod: 26,HCNC,RT, | Performed by: RADIOLOGY

## 2023-07-07 PROCEDURE — 73564 XR KNEE ORTHO RIGHT WITH FLEXION: ICD-10-PCS | Mod: 26,HCNC,RT, | Performed by: RADIOLOGY

## 2023-07-07 PROCEDURE — 3288F FALL RISK ASSESSMENT DOCD: CPT | Mod: HCNC,CPTII,S$GLB, | Performed by: ORTHOPAEDIC SURGERY

## 2023-07-07 PROCEDURE — 1125F AMNT PAIN NOTED PAIN PRSNT: CPT | Mod: HCNC,CPTII,S$GLB, | Performed by: ORTHOPAEDIC SURGERY

## 2023-07-07 PROCEDURE — 1160F PR REVIEW ALL MEDS BY PRESCRIBER/CLIN PHARMACIST DOCUMENTED: ICD-10-PCS | Mod: HCNC,CPTII,S$GLB, | Performed by: ORTHOPAEDIC SURGERY

## 2023-07-07 PROCEDURE — 99203 OFFICE O/P NEW LOW 30 MIN: CPT | Mod: HCNC,S$GLB,, | Performed by: ORTHOPAEDIC SURGERY

## 2023-07-07 PROCEDURE — 1125F PR PAIN SEVERITY QUANTIFIED, PAIN PRESENT: ICD-10-PCS | Mod: HCNC,CPTII,S$GLB, | Performed by: ORTHOPAEDIC SURGERY

## 2023-07-07 PROCEDURE — 99203 PR OFFICE/OUTPT VISIT, NEW, LEVL III, 30-44 MIN: ICD-10-PCS | Mod: HCNC,S$GLB,, | Performed by: ORTHOPAEDIC SURGERY
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
## 2023-07-11 ENCOUNTER — TELEPHONE (OUTPATIENT)
Dept: INTERNAL MEDICINE | Facility: CLINIC | Age: 78
End: 2023-07-11
Payer: MEDICARE

## 2023-07-11 NOTE — TELEPHONE ENCOUNTER
----- Message from Kristen Friend sent at 7/11/2023  1:06 PM CDT -----  Contact: pt  The pt has questions concerning her Podiatry referral, no additional info given and can be reached at [phone]///[email or web address]

## 2023-07-12 ENCOUNTER — TELEPHONE (OUTPATIENT)
Dept: ADMINISTRATIVE | Facility: HOSPITAL | Age: 78
End: 2023-07-12
Payer: MEDICARE

## 2023-07-12 PROBLEM — M17.11 PRIMARY OSTEOARTHRITIS OF RIGHT KNEE: Status: ACTIVE | Noted: 2023-07-12

## 2023-07-27 ENCOUNTER — OFFICE VISIT (OUTPATIENT)
Dept: ORTHOPEDICS | Facility: CLINIC | Age: 78
End: 2023-07-27
Payer: MEDICARE

## 2023-07-27 ENCOUNTER — HOSPITAL ENCOUNTER (OUTPATIENT)
Dept: RADIOLOGY | Facility: HOSPITAL | Age: 78
Discharge: HOME | End: 2023-07-27
Attending: ORTHOPAEDIC SURGERY
Payer: MEDICARE

## 2023-07-27 DIAGNOSIS — M20.11 VALGUS DEFORMITY OF BOTH GREAT TOES: ICD-10-CM

## 2023-07-27 DIAGNOSIS — M20.12 VALGUS DEFORMITY OF BOTH GREAT TOES: ICD-10-CM

## 2023-07-27 PROCEDURE — 73630 X-RAY EXAM OF FOOT: CPT | Mod: 26,50,HCNC, | Performed by: RADIOLOGY

## 2023-07-27 PROCEDURE — 99214 OFFICE O/P EST MOD 30 MIN: CPT | Mod: HCNC,S$GLB,, | Performed by: ORTHOPAEDIC SURGERY

## 2023-07-27 PROCEDURE — 1160F PR REVIEW ALL MEDS BY PRESCRIBER/CLIN PHARMACIST DOCUMENTED: ICD-10-PCS | Mod: HCNC,CPTII,S$GLB, | Performed by: ORTHOPAEDIC SURGERY

## 2023-07-27 PROCEDURE — 99999 PR PBB SHADOW E&M-EST. PATIENT-LVL III: ICD-10-PCS | Mod: PBBFAC,HCNC,, | Performed by: ORTHOPAEDIC SURGERY

## 2023-07-27 PROCEDURE — 73630 X-RAY EXAM OF FOOT: CPT | Mod: TC,50,HCNC

## 2023-07-27 PROCEDURE — 99999 PR PBB SHADOW E&M-EST. PATIENT-LVL III: CPT | Mod: PBBFAC,HCNC,, | Performed by: ORTHOPAEDIC SURGERY

## 2023-07-27 PROCEDURE — 1159F MED LIST DOCD IN RCRD: CPT | Mod: HCNC,CPTII,S$GLB, | Performed by: ORTHOPAEDIC SURGERY

## 2023-07-27 PROCEDURE — 73630 XR FOOT COMPLETE 3 VIEW BILATERAL: ICD-10-PCS | Mod: 26,50,HCNC, | Performed by: RADIOLOGY

## 2023-07-27 PROCEDURE — 99214 PR OFFICE/OUTPT VISIT, EST, LEVL IV, 30-39 MIN: ICD-10-PCS | Mod: HCNC,S$GLB,, | Performed by: ORTHOPAEDIC SURGERY

## 2023-07-27 PROCEDURE — 1160F RVW MEDS BY RX/DR IN RCRD: CPT | Mod: HCNC,CPTII,S$GLB, | Performed by: ORTHOPAEDIC SURGERY

## 2023-07-27 PROCEDURE — 1159F PR MEDICATION LIST DOCUMENTED IN MEDICAL RECORD: ICD-10-PCS | Mod: HCNC,CPTII,S$GLB, | Performed by: ORTHOPAEDIC SURGERY

## 2023-07-27 PROCEDURE — 1126F PR PAIN SEVERITY QUANTIFIED, NO PAIN PRESENT: ICD-10-PCS | Mod: HCNC,CPTII,S$GLB, | Performed by: ORTHOPAEDIC SURGERY

## 2023-07-27 PROCEDURE — 1126F AMNT PAIN NOTED NONE PRSNT: CPT | Mod: HCNC,CPTII,S$GLB, | Performed by: ORTHOPAEDIC SURGERY

## 2023-07-27 NOTE — PROGRESS NOTES
Subjective:   Chief complaint:   Chief Complaint   Patient presents with    Left Foot - Pain    Right Foot - Pain     Referring provider: Dr. Dale Krishnamurthy     HISTORY:  Katiana Hagan is a 77 y.o. female presents with c/o bl chronic foot pain when she wears shoes. She stated that she has tried multiple orthotics without significant pain relief. She is unable to find shoes good enough to wear without pain which is affecting her abilities to dance which is her hobby. She wanted evaluation for treatment of he b/l hallux valgus    PAST MEDICAL/SURGICAL HISTORY:  Past Medical History:   Diagnosis Date    Anxiety 8/13/2019    Other and unspecified hyperlipidemia 10/29/2013    Recurrent major depressive disorder, in remission 11/30/2020    Urge incontinence 2/9/2017    Uterovaginal prolapse, incomplete 2/9/2017    Venous insufficiency of both lower extremities     Vitamin D deficiency 9/6/2017     Past Surgical History:   Procedure Laterality Date    APPENDECTOMY  age 18    AUGMENTATION OF BREAST Bilateral 1988    Breast augmentation  1988    COSMETIC SURGERY  03/08/2016    facelift    DILATION AND CURETTAGE OF UTERUS      MYOMECTOMY         Current Medications:   Current Outpatient Medications:     calcium-vitamin D3-vitamin K 500-500-40 mg-unit-mcg Chew, Take 1 tablet by mouth once daily., Disp: , Rfl:     cartilage/collagen/bor/hyalur (JOINT HEALTH ORAL), Take by mouth., Disp: , Rfl:     Chol/Gl/Ser/RNA/Phen/Prg/Hb150 (SHARPER FOCUS ORAL), Take by mouth., Disp: , Rfl:     cholecalciferol, vitamin D3, 2,000 unit Cap, Take 1,000 Units by mouth once daily., Disp: , Rfl:     conjugated estrogens (PREMARIN) vaginal cream, PLACE 0.5 GRAMS IN THE VAGINA THREE TIMES A WEEK AS DIRECTED, Disp: 60 g, Rfl: 4    glucosamine avila 2KCl-chondroit 750-600 mg Tab, Take 1 tablet by mouth 2 (two) times a day. Osteo Biflex, Disp: , Rfl:     LACTOBACILLUS ACIDOPHILUS (PROBIOTIC ORAL), Take by mouth., Disp: , Rfl:     meloxicam (MOBIC)  15 MG tablet, Take 1 tablet (15 mg total) by mouth daily as needed for Pain (Take with food)., Disp: 30 tablet, Rfl: 0    MV-MN/VITC/ASBNA/GLU/KWAME/ (AIRBORNE, ASCORBATE SODIUM, ORAL), Take by mouth., Disp: , Rfl:     NON FORMULARY MEDICATION, Daily. Nutrafol, Disp: , Rfl:     tretinoin (RETIN-A) 0.05 % cream, APPLY PEA SIZED DROP TO FACE NIGHTLY. WASH OFF INNTHE MORNING. SFP ALWAYS, Disp: , Rfl:     TURMERIC, BULK, MISC, Take 1,000 mg by mouth once daily., Disp: , Rfl:     Social History:   Social History     Socioeconomic History    Marital status:    Tobacco Use    Smoking status: Never    Smokeless tobacco: Never   Substance and Sexual Activity    Alcohol use: Yes     Alcohol/week: 1.0 standard drink     Types: 1 Drinks containing 0.5 oz of alcohol per week     Comment: maybe 1 glass of liquer weekly    Drug use: No    Sexual activity: Not Currently     Partners: Male     Birth control/protection: Post-menopausal     Comment:  2015     Social Determinants of Health     Financial Resource Strain: Low Risk     Difficulty of Paying Living Expenses: Not hard at all   Food Insecurity: No Food Insecurity    Worried About Running Out of Food in the Last Year: Never true    Ran Out of Food in the Last Year: Never true   Transportation Needs: No Transportation Needs    Lack of Transportation (Medical): No    Lack of Transportation (Non-Medical): No   Physical Activity: Inactive    Days of Exercise per Week: 0 days    Minutes of Exercise per Session: 0 min   Stress: No Stress Concern Present    Feeling of Stress : Not at all   Social Connections: Socially Isolated    Frequency of Communication with Friends and Family: Once a week    Frequency of Social Gatherings with Friends and Family: Once a week    Attends Moravian Services: Never    Active Member of Clubs or Organizations: No    Attends Club or Organization Meetings: Never    Marital Status:    Housing Stability: Unknown    Unable to Pay for  Housing in the Last Year: No    Unstable Housing in the Last Year: No         Objective:       REVIEW OF SYSTEMS:  Constitution: Negative. Negative for chills, fever and night sweats.   Cardiovascular: Negative for chest pain and syncope.   Respiratory: Negative for cough and shortness of breath.   Gastrointestinal: See HPI. Negative for nausea/vomiting. Negative for abdominal pain.  Genitourinary: See HPI. Negative for discoloration or dysuria.  Skin: Negative for dry skin, itching and rash.   Hematologic/Lymphatic: Negative for bleeding problem. Does not bruise/bleed easily.   Musculoskeletal: Negative for falls and muscle weakness.   Neurological: See HPI. No seizures.   Endocrine: Negative for polydipsia, polyphagia and polyuria.   Allergic/Immunologic: Negative for hives and persistent infections.      MSK:     LLE  Skin closed, no fracture blisters or tenting of skin, no callus  Deformity: Hallux valgus (valgus, varus, equinus)  Ecchymoses: none  Swelling: none  TTP: none  Compartments soft and compressible  ROM: full dorsiflexion & plantarflexion; full inversion & eversion   Ligaments: neg anterior drawer test; neg varus stress test  SILT Sa/Saavedra/SP/DP  Motor intact EHL/FHL/Gastroc/TA  < 3 secs cap refill  Warm well perfused extremities  DP pulse 2+ palpable .      RLE  Skin closed, no fracture blisters or tenting of skin, no callus  Deformity: Hallux valgus (valgus, varus, equinus)  Ecchymoses: none  Swelling: none  TTP: none  Compartments soft and compressible  ROM: full dorsiflexion & plantarflexion; full inversion & eversion   Ligaments: neg anterior drawer test; neg varus stress test  SILT Sa/Saavedra/SP/DP  Motor intact EHL/FHL/Gastroc/TA  < 3 secs cap refill  Warm well perfused extremities  DP pulse 2+ palpable .    Imaging:  I independently reviewed and interpreted the imaging and my findings are as follows:     Bl Xray of the foot shows presence of hallux valgus.       Assessment:       1. Valgus deformity  of both great toes          Plan:       Pt was advised that bunion surgery are encouraged for pts experiencing significant pain. She agreed that her pain when wearing shoes is significant and uncomfortable.   She was advised on the surgical course, outcomes, and expectations of a bunion surgery. She had an SEA of 13 degrees on her L foot and was advised she would be considered for a Chevron osteotomy and a possible Akin osteotomy depending on intraop evaluations.  Both surgeries were extensively explained to patient.   She wanted to take her time to think about it before committing to surgery    I have personally taken the history and examined this patient and agree with the residents note as stated above.      Orders Placed This Encounter   Procedures    X-Ray Foot Complete Bilateral     Standing Status:   Future     Number of Occurrences:   1     Standing Expiration Date:   7/26/2024       Past Medical History:   Diagnosis Date    Anxiety 8/13/2019    Other and unspecified hyperlipidemia 10/29/2013    Recurrent major depressive disorder, in remission 11/30/2020    Urge incontinence 2/9/2017    Uterovaginal prolapse, incomplete 2/9/2017    Venous insufficiency of both lower extremities     Vitamin D deficiency 9/6/2017       Past Surgical History:   Procedure Laterality Date    APPENDECTOMY  age 18    AUGMENTATION OF BREAST Bilateral 1988    Breast augmentation  1988    COSMETIC SURGERY  03/08/2016    facelift    DILATION AND CURETTAGE OF UTERUS      MYOMECTOMY         Family History   Problem Relation Age of Onset    Breast cancer Maternal Aunt     No Known Problems Father     No Known Problems Mother     Diabetes Sister     Schizophrenia Son     Heart disease Sister     No Known Problems Son     Colon cancer Neg Hx     Hypertension Neg Hx     Ovarian cancer Neg Hx     Stroke Neg Hx        Social History     Socioeconomic History    Marital status:    Tobacco Use    Smoking status: Never    Smokeless  tobacco: Never   Substance and Sexual Activity    Alcohol use: Yes     Alcohol/week: 1.0 standard drink     Types: 1 Drinks containing 0.5 oz of alcohol per week     Comment: maybe 1 glass of liquer weekly    Drug use: No    Sexual activity: Not Currently     Partners: Male     Birth control/protection: Post-menopausal     Comment:  2015     Social Determinants of Health     Financial Resource Strain: Low Risk     Difficulty of Paying Living Expenses: Not hard at all   Food Insecurity: No Food Insecurity    Worried About Running Out of Food in the Last Year: Never true    Ran Out of Food in the Last Year: Never true   Transportation Needs: No Transportation Needs    Lack of Transportation (Medical): No    Lack of Transportation (Non-Medical): No   Physical Activity: Inactive    Days of Exercise per Week: 0 days    Minutes of Exercise per Session: 0 min   Stress: No Stress Concern Present    Feeling of Stress : Not at all   Social Connections: Socially Isolated    Frequency of Communication with Friends and Family: Once a week    Frequency of Social Gatherings with Friends and Family: Once a week    Attends Nondenominational Services: Never    Active Member of Clubs or Organizations: No    Attends Club or Organization Meetings: Never    Marital Status:    Housing Stability: Unknown    Unable to Pay for Housing in the Last Year: No    Unstable Housing in the Last Year: No

## 2023-08-16 DIAGNOSIS — M25.561 RIGHT MEDIAL KNEE PAIN: ICD-10-CM

## 2023-08-16 RX ORDER — MELOXICAM 15 MG/1
15 TABLET ORAL DAILY PRN
Qty: 30 TABLET | Refills: 0 | Status: SHIPPED | OUTPATIENT
Start: 2023-08-16 | End: 2023-10-12 | Stop reason: SDUPTHER

## 2023-08-16 NOTE — TELEPHONE ENCOUNTER
----- Message from Dajuan Alas MD sent at 8/16/2023 12:55 PM CDT -----  Contact: Donnie [phone]    ----- Message -----  From: Vivian Jolly  Sent: 8/16/2023  12:27 PM CDT  To: [facility] Staff    Requesting an RX refill or new RX.    Is this a refill or new RX: --refill--    RX name and strength (copy/paste from chart):    1.meloxicam (MOBIC) 15 MG tablet    Is this a 30 day or 90 day RX: --30-days--    Pharmacy name and phone # (copy/paste from chart):    [facility][id number] - [place], [place] [facility] [address]  [facility] [address]  [place] [address]  Phone: [phone] Fax: [phone]

## 2023-10-12 DIAGNOSIS — M25.561 RIGHT MEDIAL KNEE PAIN: ICD-10-CM

## 2023-10-12 RX ORDER — MELOXICAM 15 MG/1
15 TABLET ORAL DAILY PRN
Qty: 30 TABLET | Refills: 0 | Status: SHIPPED | OUTPATIENT
Start: 2023-10-12 | End: 2023-11-27 | Stop reason: SDUPTHER

## 2023-10-12 NOTE — TELEPHONE ENCOUNTER
----- Message from Ericka Manish sent at 10/12/2023 12:24 PM CDT -----  Contact: [phone] Patient  Requesting an RX refill or new RX.  Is this a refill or new RX: new-pt states she is still having the pain in her knee from the arthritis.   RX name and strength (copy/paste from chart):  meloxicam (MOBIC) 15 MG tablet  Is this a 30 day or 90 day RX: 30  Pharmacy name and phone # (copy/paste from chart):    [facility] #[id number] - [place] [id number] [facility] [address] [place] [address]  [place] [address]  Phone: [phone] Fax: [phone]     The doctors have asked that we provide their patients with the following 2 reminders -- prescription refills can take up to 72 hours, and a friendly reminder that in the future you can use your [facility] account to request refills: yes

## 2023-10-12 NOTE — TELEPHONE ENCOUNTER
Care Due:                  Date            Visit Type   Department     Provider  --------------------------------------------------------------------------------                                EP -                              PRIMARY      [facility] INTERNAL  Last Visit: 06-      CARE ([facility])   MEDICINE       Dajuan Alas  Next Visit: None Scheduled  None         None Found                                                            Last  Test          Frequency    Reason                     Performed    Due Date  --------------------------------------------------------------------------------    CMP.........  12 months..  meloxicam................  Not Found    Overdue    [employer] Embedded Care Due Messages. Reference number: [id number].   10/12/2023 12:59:53 PM CDT

## 2023-10-13 ENCOUNTER — OFFICE VISIT (OUTPATIENT)
Dept: PODIATRY | Facility: CLINIC | Age: 78
End: 2023-10-13
Payer: MEDICARE

## 2023-10-13 VITALS — WEIGHT: 155.19 LBS | HEIGHT: 66 IN | BODY MASS INDEX: 24.94 KG/M2

## 2023-10-13 DIAGNOSIS — M79.672 FOOT PAIN, BILATERAL: ICD-10-CM

## 2023-10-13 DIAGNOSIS — M79.671 FOOT PAIN, BILATERAL: ICD-10-CM

## 2023-10-13 DIAGNOSIS — S90.212A CONTUSION OF LEFT GREAT TOE WITH DAMAGE TO NAIL, INITIAL ENCOUNTER: Primary | ICD-10-CM

## 2023-10-13 PROCEDURE — 1159F PR MEDICATION LIST DOCUMENTED IN MEDICAL RECORD: ICD-10-PCS | Mod: HCNC,CPTII,S$GLB, | Performed by: PODIATRIST

## 2023-10-13 PROCEDURE — 1101F PT FALLS ASSESS-DOCD LE1/YR: CPT | Mod: HCNC,CPTII,S$GLB, | Performed by: PODIATRIST

## 2023-10-13 PROCEDURE — 1159F MED LIST DOCD IN RCRD: CPT | Mod: HCNC,CPTII,S$GLB, | Performed by: PODIATRIST

## 2023-10-13 PROCEDURE — 3288F PR FALLS RISK ASSESSMENT DOCUMENTED: ICD-10-PCS | Mod: HCNC,CPTII,S$GLB, | Performed by: PODIATRIST

## 2023-10-13 PROCEDURE — 99204 PR OFFICE/OUTPT VISIT, NEW, LEVL IV, 45-59 MIN: ICD-10-PCS | Mod: HCNC,S$GLB,, | Performed by: PODIATRIST

## 2023-10-13 PROCEDURE — 99204 OFFICE O/P NEW MOD 45 MIN: CPT | Mod: HCNC,S$GLB,, | Performed by: PODIATRIST

## 2023-10-13 PROCEDURE — 1126F AMNT PAIN NOTED NONE PRSNT: CPT | Mod: HCNC,CPTII,S$GLB, | Performed by: PODIATRIST

## 2023-10-13 PROCEDURE — 99999 PR PBB SHADOW E&M-EST. PATIENT-LVL III: ICD-10-PCS | Mod: PBBFAC,HCNC,, | Performed by: PODIATRIST

## 2023-10-13 PROCEDURE — 3288F FALL RISK ASSESSMENT DOCD: CPT | Mod: HCNC,CPTII,S$GLB, | Performed by: PODIATRIST

## 2023-10-13 PROCEDURE — 1101F PR PT FALLS ASSESS DOC 0-1 FALLS W/OUT INJ PAST YR: ICD-10-PCS | Mod: HCNC,CPTII,S$GLB, | Performed by: PODIATRIST

## 2023-10-13 PROCEDURE — 99999 PR PBB SHADOW E&M-EST. PATIENT-LVL III: CPT | Mod: PBBFAC,HCNC,, | Performed by: PODIATRIST

## 2023-10-13 PROCEDURE — 1126F PR PAIN SEVERITY QUANTIFIED, NO PAIN PRESENT: ICD-10-PCS | Mod: HCNC,CPTII,S$GLB, | Performed by: PODIATRIST

## 2023-10-13 RX ORDER — IPRATROPIUM BROMIDE 42 UG/1
SPRAY, METERED NASAL
COMMUNITY
Start: 2023-08-31 | End: 2024-02-23

## 2023-10-13 RX ORDER — TOBRAMYCIN 3 MG/ML
SOLUTION/ DROPS OPHTHALMIC
Qty: 5 ML | Refills: 3 | Status: SHIPPED | OUTPATIENT
Start: 2023-10-13

## 2023-10-13 NOTE — PROGRESS NOTES
Subjective:      Patient ID: Katiana Hagan is a 77 y.o. female.    Chief Complaint: Nail Problem (L great toenail came off)    Sharp stinging pain left great toe/toenail.  Rapid onset stubbing the toe about 5 days ago.  Aggravated with socks shoes pressure ambulation.  No prior medical treatment.  Self-treatment with Band-Aid has been adequate.  Denies repeat trauma and surgery both feet.  No prior medical treatment.    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  They go through good and bad phases.  This particular exacerbation is Gradual onset, worsening over past several weeks, aggravated by increased weight bearing, shoe gear, pressure.  No previous medical treatment.  OTC pain med not helping.  Denies trauma and surgery both feet.    Independent evaluation x-rays 07/27/2023 shows hallux valgus with bunion good sesamoid position, reasonably preserved joint space without acute injury or substantial degeneration.    Review of Systems   Constitutional: Negative for chills, diaphoresis, fever, malaise/fatigue and night sweats.   Cardiovascular:  Negative for claudication, cyanosis, leg swelling and syncope.   Skin:  Positive for nail changes. Negative for color change, dry skin, rash, suspicious lesions and unusual hair distribution.   Musculoskeletal:  Positive for joint pain. Negative for falls, joint swelling, muscle cramps, muscle weakness and stiffness.   Gastrointestinal:  Negative for constipation, diarrhea, nausea and vomiting.   Neurological:  Negative for brief paralysis, disturbances in coordination, focal weakness, numbness, paresthesias, sensory change and tremors.         Objective:      Physical Exam  Constitutional:       General: She is not in acute distress.     Appearance: She is well-developed. She is not diaphoretic.   Cardiovascular:      Pulses:           Popliteal pulses are 2+ on the right side and 2+ on the left side.        Dorsalis pedis pulses are 2+ on the  right side and 2+ on the left side.        Posterior tibial pulses are 2+ on the right side and 2+ on the left side.      Comments: Capillary refill 3 seconds all toes/distal feet, all toes/both feet warm to touch.      Negative lymphadenopathy bilateral popliteal fossa and tarsal tunnel.      Negavie lower extremity edema bilateral.    Musculoskeletal:      Right ankle: No swelling, deformity, ecchymosis or lacerations. Normal range of motion. Normal pulse.      Right Achilles Tendon: Normal. No defects. Saxena's test negative.      Comments: Visible and palpable bunion with pain at dorsomedial 1st metatarsal head left and right.  Hallux abducted left and right partially reducible, tracks laterally without being track bound.  No ecchymosis, erythema, edema, or cardinal signs infection or signs of trauma same foot.    Otherwise, Normal angle, base, station of gait. All ten toes without clubbing, cyanosis, or signs of ischemia.  No pain to palpation bilateral lower extremities.  Range of motion, stability, muscle strength, and muscle tone normal bilateral feet and legs.    Lymphadenopathy:      Lower Body: No right inguinal adenopathy. No left inguinal adenopathy.      Comments: Negative lymphadenopathy bilateral popliteal fossa and tarsal tunnel.    Negative lymphangitic streaking bilateral feet/ankles/legs.   Skin:     General: Skin is warm and dry.      Capillary Refill: Capillary refill takes 2 to 3 seconds.      Coloration: Skin is not pale.      Findings: No abrasion, bruising, burn, ecchymosis, erythema, laceration, lesion or rash.      Nails: There is no clubbing.      Comments:   Left hallux toenails been completely dramatically avulsed leaving a wound that is pink granular with serous drainage only without pus tracking fluctuance malodor signs of infection.      Otherwise, Skin is normal age and health appropriate color, turgor, texture, and temperature bilateral lower extremities without ulceration,  hyperpigmentation, discoloration, masses nodules or cords palpated.  No ecchymosis, erythema, edema, or cardinal signs of infection bilateral lower extremities.     Neurological:      Mental Status: She is alert and oriented to person, place, and time.      Sensory: No sensory deficit.      Motor: No tremor, atrophy or abnormal muscle tone.      Gait: Gait normal.      Comments: Negative allodynia both feet   Psychiatric:         Behavior: Behavior is cooperative.           Assessment:       Encounter Diagnoses   Name Primary?    Contusion of left great toe with damage to nail, initial encounter Yes    Right foot pain          Plan:       Katiana was seen today for nail problem.    Diagnoses and all orders for this visit:    Contusion of left great toe with damage to nail, initial encounter    Right foot pain    Other orders  -     tobramycin sulfate 0.3% (TOBREX) 0.3 % ophthalmic solution; 1-2 drops topically twice daily to affected toe(s).      I counseled the patient on her conditions, their implications and medical management.        Patient will stretch the tendo achilles complex three times daily as demonstrated in the office.  Literature was dispensed illustrating proper stretching technique.    Patient will obtain over the counter arch supports and wear them in shoes whenever possible.  Athletic shoes intended for walking or running are usually best.    The patient was advised that NSAID-type medications have two very important potential side effects: gastrointestinal irritation including hemorrhage and renal injuries. She was asked to take the medication with food and to stop if she experiences any GI upset. I asked her to call for vomiting, abdominal pain or black/bloody stools. The patient expresses understanding of these issues and questions were answered.    Discussed conservative treatment with shoes of adequate dimensions, material, and style to alleviate symptoms and delay or prevent surgical  intervention.      Tobramycin drops left hallux twice daily.      Cover left hallux all times with Band-Aid or similar changing daily.      Discussed conservative treatment with shoes of adequate dimensions, material, and style to alleviate symptoms and delay or prevent surgical intervention.    Declines x-rays left toe today.          No follow-ups on file.

## 2023-10-23 ENCOUNTER — TELEPHONE (OUTPATIENT)
Dept: INTERNAL MEDICINE | Facility: CLINIC | Age: 78
End: 2023-10-23
Payer: MEDICARE

## 2023-10-23 NOTE — TELEPHONE ENCOUNTER
----- Message from Tereza Burkett sent at 10/23/2023 12:08 PM CDT -----  Contact: [phone]  Patient called, would like a call back from Dr Ren's nurse or the pcp in regards medication. Patient want to know If can be rx hydroxyzine, patient stated that the  recommend that to her. Please call and advise. Thank you

## 2023-10-24 ENCOUNTER — OFFICE VISIT (OUTPATIENT)
Dept: INTERNAL MEDICINE | Facility: CLINIC | Age: 78
End: 2023-10-24
Payer: MEDICARE

## 2023-10-24 DIAGNOSIS — F41.9 ANXIETY: Primary | ICD-10-CM

## 2023-10-24 DIAGNOSIS — B37.31 VAGINAL YEAST INFECTION: ICD-10-CM

## 2023-10-24 DIAGNOSIS — F32.A DEPRESSION, UNSPECIFIED DEPRESSION TYPE: ICD-10-CM

## 2023-10-24 PROCEDURE — 99214 OFFICE O/P EST MOD 30 MIN: CPT | Mod: HCNC,95,, | Performed by: INTERNAL MEDICINE

## 2023-10-24 PROCEDURE — 1159F MED LIST DOCD IN RCRD: CPT | Mod: HCNC,CPTII,95, | Performed by: INTERNAL MEDICINE

## 2023-10-24 PROCEDURE — 1159F PR MEDICATION LIST DOCUMENTED IN MEDICAL RECORD: ICD-10-PCS | Mod: HCNC,CPTII,95, | Performed by: INTERNAL MEDICINE

## 2023-10-24 PROCEDURE — 99214 PR OFFICE/OUTPT VISIT, EST, LEVL IV, 30-39 MIN: ICD-10-PCS | Mod: HCNC,95,, | Performed by: INTERNAL MEDICINE

## 2023-10-24 PROCEDURE — 1160F RVW MEDS BY RX/DR IN RCRD: CPT | Mod: HCNC,CPTII,95, | Performed by: INTERNAL MEDICINE

## 2023-10-24 PROCEDURE — 1160F PR REVIEW ALL MEDS BY PRESCRIBER/CLIN PHARMACIST DOCUMENTED: ICD-10-PCS | Mod: HCNC,CPTII,95, | Performed by: INTERNAL MEDICINE

## 2023-10-24 RX ORDER — FLUCONAZOLE 150 MG/1
TABLET ORAL
Qty: 2 TABLET | Refills: 0 | Status: SHIPPED | OUTPATIENT
Start: 2023-10-24 | End: 2024-02-26

## 2023-10-24 RX ORDER — HYDROXYZINE HYDROCHLORIDE 25 MG/1
TABLET, FILM COATED ORAL
Qty: 60 TABLET | Refills: 3 | Status: SHIPPED | OUTPATIENT
Start: 2023-10-24 | End: 2024-02-26

## 2023-10-24 NOTE — PROGRESS NOTES
Subjective:       Patient ID: Katiana Hagan is a 77 y.o. female.    Chief Complaint: Anxiety      The patient location is: [place]  The chief complaint leading to consultation is: Anxiety and Yeast infection    Visit type: audiovisual    Face to Face time with patient: 15 minutes  22 minutes of total time spent on the encounter, which includes face to face time and non-face to face time preparing to see the patient (eg, review of tests), Obtaining and/or reviewing separately obtained history, Documenting clinical information in the electronic or other health record, Independently interpreting results (not separately reported) and communicating results to the patient/family/caregiver, or Care coordination (not separately reported).         Each patient to whom he or she provides medical services by telemedicine is:  (1) informed of the relationship between the physician and patient and the respective role of any other health care provider with respect to management of the patient; and (2) notified that he or she may decline to receive medical services by telemedicine and may withdraw from such care at any time.    Notes:     HPI: Pt known to me, wanted to talk about issues with sleep, anxiety and depression.   Son is a paranoid schizophrenic. He lives with my patient.   She has been seeing a  (only over the phone) and she enjoys that and thinks it is helping.   She is having trouble concentrating and get's distracted easily.   Here  suggested possibly trying Hydroxyzine.   We talked about benefits risks and side effects.  We talked about timing including regular use and p.r.n. use.    Incidentally patient also says she developed a yeast infection and would like a prescription for Diflucan which is what her gynecologist typically would prescribe.  She is not having any bleeding but is having typical vaginal itching she says and Diflucan has worked in the past.      Review of Systems  "  Constitutional:  Positive for activity change and unexpected weight change.   HENT:  Positive for hearing loss. Negative for trouble swallowing.    Eyes:  Positive for visual disturbance. Negative for discharge.   Respiratory:  Negative for chest tightness and wheezing.    Cardiovascular:  Negative for chest pain and palpitations.   Gastrointestinal:  Negative for blood in stool, constipation, diarrhea and vomiting.   Endocrine: Negative for polydipsia and polyuria.   Genitourinary:  Positive for vaginal discharge and vaginal dryness. Negative for difficulty urinating, dysuria, hematuria and menstrual problem.   Musculoskeletal:  Positive for arthralgias.   Neurological:  Negative for headaches.   Psychiatric/Behavioral:  Positive for dysphoric mood.          Objective:      Physical Exam  Constitutional:       Appearance: Normal appearance.   Pulmonary:      Effort: No respiratory distress.   Neurological:      Mental Status: She is alert.   Psychiatric:         Behavior: Behavior normal.         Thought Content: Thought content normal.         Assessment:       Problem List Items Addressed This Visit          Psychiatric    Anxiety - Primary     Other Visit Diagnoses       Depression, unspecified depression type        Vaginal yeast infection                Plan:       Katiana was seen today for anxiety.    Diagnoses and all orders for this visit:    Anxiety    Depression, unspecified depression type    Vaginal yeast infection    Other orders  -     hydrOXYzine HCL (ATARAX) 25 MG tablet; Take 1 pill by mouth once or twice daily for anxiety.  -     fluconazole (DIFLUCAN) 150 MG Tab; Take one by mouth and repeat at 3 days.           Update me in 2-4 weeks as to progress of hydroxyzine.  If vaginal itching symptoms do not improve she may need to see her gynecologist.        Portions of this note may have been created with voice recognition software. Occasional "wrong-word" or "sound-a-like" substitutions may have " occurred due to the inherent limitations of voice recognition software. Please, read the note carefully and recognize, using context, where substitutions have occurred.

## 2023-11-27 ENCOUNTER — PATIENT MESSAGE (OUTPATIENT)
Dept: INTERNAL MEDICINE | Facility: CLINIC | Age: 78
End: 2023-11-27
Payer: MEDICARE

## 2023-11-27 DIAGNOSIS — M25.561 RIGHT MEDIAL KNEE PAIN: ICD-10-CM

## 2023-11-27 RX ORDER — MELOXICAM 15 MG/1
15 TABLET ORAL DAILY PRN
Qty: 30 TABLET | Refills: 0 | Status: SHIPPED | OUTPATIENT
Start: 2023-11-27 | End: 2024-02-05 | Stop reason: SDUPTHER

## 2023-11-27 NOTE — TELEPHONE ENCOUNTER
Care Due:                  Date            Visit Type   Department     Provider  --------------------------------------------------------------------------------                                ESTABLISHED                              PATIENT -    [facility] INTERNAL  Last Visit: 10-      [facility]       Tukcer Ren  Next Visit: None Scheduled  None         None Found                                                            Last  Test          Frequency    Reason                     Performed    Due Date  --------------------------------------------------------------------------------    CBC.........  12 months..  meloxicam................  02- 02-    [facility] Embedded Care Due Messages. Reference number: [id number].   11/27/2023 4:36:41 PM CST

## 2023-12-05 ENCOUNTER — OFFICE VISIT (OUTPATIENT)
Dept: INTERNAL MEDICINE | Facility: CLINIC | Age: 78
End: 2023-12-05
Payer: MEDICARE

## 2023-12-05 DIAGNOSIS — M17.11 PRIMARY OSTEOARTHRITIS OF RIGHT KNEE: Primary | ICD-10-CM

## 2023-12-05 DIAGNOSIS — R41.840 DIFFICULTY CONCENTRATING: ICD-10-CM

## 2023-12-05 DIAGNOSIS — F41.9 ANXIETY: ICD-10-CM

## 2023-12-05 PROCEDURE — 99214 PR OFFICE/OUTPT VISIT, EST, LEVL IV, 30-39 MIN: ICD-10-PCS | Mod: HCNC,95,, | Performed by: INTERNAL MEDICINE

## 2023-12-05 PROCEDURE — 1160F RVW MEDS BY RX/DR IN RCRD: CPT | Mod: HCNC,CPTII,95, | Performed by: INTERNAL MEDICINE

## 2023-12-05 PROCEDURE — 99214 OFFICE O/P EST MOD 30 MIN: CPT | Mod: HCNC,95,, | Performed by: INTERNAL MEDICINE

## 2023-12-05 PROCEDURE — 1160F PR REVIEW ALL MEDS BY PRESCRIBER/CLIN PHARMACIST DOCUMENTED: ICD-10-PCS | Mod: HCNC,CPTII,95, | Performed by: INTERNAL MEDICINE

## 2023-12-05 PROCEDURE — 1159F MED LIST DOCD IN RCRD: CPT | Mod: HCNC,CPTII,95, | Performed by: INTERNAL MEDICINE

## 2023-12-05 PROCEDURE — 1159F PR MEDICATION LIST DOCUMENTED IN MEDICAL RECORD: ICD-10-PCS | Mod: HCNC,CPTII,95, | Performed by: INTERNAL MEDICINE

## 2023-12-05 RX ORDER — ATOMOXETINE 25 MG/1
25 CAPSULE ORAL DAILY
Qty: 30 CAPSULE | Refills: 0 | Status: SHIPPED | OUTPATIENT
Start: 2023-12-05 | End: 2024-01-14 | Stop reason: SDUPTHER

## 2023-12-05 NOTE — PROGRESS NOTES
Subjective:       Patient ID: Katiana Hagan is a 77 y.o. female.   Chief Complaint: Knee Pain    Patient seen virtually for 2 reasons.  The 1st is knee pain.  She saw Orthopedics and was hesitant to get a cortisone shot but wanted my opinion.  We discussed at length and I explained that sometimes it works great, sometimes the shots need to be done periodically, and sometimes patients need surgery regardless of shots.  She is willing to give it a try and will reach out.    This is also follow-up to her last visit were we discussed anxiety and trouble concentrating.  She is seeing a therapist who is working with her on anxiety and concentration.  I explained that I did not feel comfortable prescribing Adderall or the significantly stimulant medications but she would like to try a low-dose Strattera.  I explained it could still potentially have some of the side effects and we would start with a low-dose.  If that does not help we would look for other avenues to help her including a possible memory evaluation with Neurology.      The patient location is: [place]  The chief complaint leading to consultation is: Knee pain and concentration issues    Visit type: audiovisual    Face to Face time with patient: 15  20 minutes of total time spent on the encounter, which includes face to face time and non-face to face time preparing to see the patient (eg, review of tests), Obtaining and/or reviewing separately obtained history, Documenting clinical information in the electronic or other health record, Independently interpreting results (not separately reported) and communicating results to the patient/family/caregiver, or Care coordination (not separately reported).         Each patient to whom he or she provides medical services by telemedicine is:  (1) informed of the relationship between the physician and patient and the respective role of any other health care provider with respect to management of the patient; and (2)  notified that he or she may decline to receive medical services by telemedicine and may withdraw from such care at any time.    Notes:        Review of Systems   Constitutional:  Negative for activity change and unexpected weight change.   HENT:  Negative for hearing loss, rhinorrhea and trouble swallowing.    Eyes:  Negative for discharge.   Respiratory:  Negative for chest tightness and wheezing.    Cardiovascular:  Negative for chest pain and palpitations.   Gastrointestinal:  Negative for blood in stool, constipation, diarrhea and vomiting.   Endocrine: Negative for polydipsia and polyuria.   Genitourinary:  Negative for difficulty urinating, dysuria, hematuria and menstrual problem.   Musculoskeletal:  Positive for arthralgias and joint swelling. Negative for neck pain.   Neurological:  Negative for weakness and headaches.   Psychiatric/Behavioral:  Positive for confusion, decreased concentration and dysphoric mood. Negative for sleep disturbance and suicidal ideas.           Objective:      Physical Exam  Constitutional:       Appearance: Normal appearance.   Pulmonary:      Effort: No respiratory distress.   Neurological:      General: No focal deficit present.      Mental Status: She is alert.   Psychiatric:         Mood and Affect: Mood normal.         Thought Content: Thought content normal.       Assessment:       Problem List Items Addressed This Visit          Psychiatric    Anxiety       Orthopedic    Primary osteoarthritis of right knee - Primary     Other Visit Diagnoses       Difficulty concentrating                Plan:       Katiana was seen today for knee pain.    Diagnoses and all orders for this visit:    Primary osteoarthritis of right knee    Difficulty concentrating    Anxiety    Other orders  -     atomoxetine (STRATTERA) 25 MG capsule; Take 1 capsule (25 mg total) by mouth once daily.       Definitely consider following up with Orthopedics for the knee injection.    Long discussion about  "benefits and side effects of Strattera including elevated blood pressure, trouble sleeping, dry mouth, arrhythmia, chest pain to name a few common ones.  She expressed understanding and understands restarting a low-dose and that I want her to follow-up with me in 2-4 weeks.          Portions of this note may have been created with voice recognition software. Occasional "wrong-word" or "sound-a-like" substitutions may have occurred due to the inherent limitations of voice recognition software. Please, read the note carefully and recognize, using context, where substitutions have occurred.  "

## 2023-12-22 ENCOUNTER — TELEPHONE (OUTPATIENT)
Dept: INTERNAL MEDICINE | Facility: CLINIC | Age: 78
End: 2023-12-22
Payer: MEDICARE

## 2023-12-22 NOTE — TELEPHONE ENCOUNTER
----- Message from Liseth Nicholas sent at 12/22/2023  8:51 AM CST -----  1MEDICALADVICE     Patient is calling for Medical Advice regarding: cough w/ little phlegm     How long has patient had these symptoms: few days     Pharmacy name and phone#:   [id number] [place] [facility] [address] AT [facility] [address]  [address]  [place] [address]  Phone: [phone] Fax: [phone]       Would like response via byydhart:  call     Comments:    Pt called to advise that  she has a cough  little phlegm no fever negative for Covid doesn't feel great, but don't feel bad. Pt says she is currently taking Desylm and Elderberry. Would like to know if she should cancel [place] or is it okay to be around others. Please Advise.

## 2023-12-22 NOTE — TELEPHONE ENCOUNTER
"Called patient regarding message.  Pt reports intermittent cough with one time phlegm (white, thick) with mild rhinorrhea and nasal congestion    Onset 3 days  Deslym, elderberry, vitamin c and vitamins taken with relief  Denies fever, headache, lethargy, myalgia, tiredness  Negative COVID this am.  Pt reports,"I don't feel bad"    Advised pt it is up to her whether she has family over for [place]. Encouraged pt to take another COVID test in 2 days.   Pt states she will not cancel [place] but will tell everyone she is having cold type symptoms and they can decide if they want to go..  Advised pt to call [facility] if feeling ill over the holidays  "

## 2023-12-30 ENCOUNTER — NURSE TRIAGE (OUTPATIENT)
Dept: ADMINISTRATIVE | Facility: CLINIC | Age: 78
End: 2023-12-30
Payer: MEDICARE

## 2023-12-30 NOTE — TELEPHONE ENCOUNTER
Reason for Disposition   Cough present > 3 weeks    Additional Information   Negative: SEVERE difficulty breathing (e.g., struggling for each breath, speaks in single words)   Negative: Bluish (or gray) lips or face now   Negative: Shock suspected (e.g., cold/pale/clammy skin, too weak to stand, low BP, rapid pulse)   Negative: Sounds like a life-threatening emergency to the triager   Negative: Chest pain  (Exception: MILD central chest pain, present only when coughing.)   Negative: Headache and stiff neck (can't touch chin to chest)   Negative: [1] Difficulty breathing AND [2] not severe AND [3] not from stuffy nose (e.g., not relieved by cleaning out the nose)   Negative: Fever > 104 F (40 C)   Negative: Patient sounds very sick or weak to the triager   Negative: Fever present > 3 days (72 hours)   Negative: [1] Fever returns after gone for over 24 hours AND [2] symptoms worse or not improved   Negative: [1] Using nasal washes and pain medicine > 24 hours AND [2] sinus pain (around cheekbone or eye) persists   Negative: Earache   Negative: [1] HIGH RISK (e.g., age > 64 years, pregnant, HIV+, or chronic medical condition) AND [2]  > 72 hours (3 days) since evaluated by doctor (or NP/PA) AND [3] symptoms not improved   Negative: [1] Taking antiviral medication AND [2] has question about the medication that triager can't answer   Negative: [1] Nasal discharge AND [2] present > 10 days    Protocols used: Influenza (Flu) Follow-up Call-A-  Pt called re had the flu. Pt reports she is getting better and then has a coughing again. is she contagious?. Afeb. no SOB. coughing x 10 days or more. eating and drinking. used delsym and nyquil. Rec to see dr within 3 days. Offered protocol advice.

## 2024-01-13 NOTE — TELEPHONE ENCOUNTER
Care Due:                  Date            Visit Type   Department     Provider  --------------------------------------------------------------------------------                                ESTABLISHED                              PATIENT -    [facility] INTERNAL  Last Visit: 12-      [facility]       Tucker Ren  Next Visit: None Scheduled  None         None Found                                                            Last  Test          Frequency    Reason                     Performed    Due Date  --------------------------------------------------------------------------------    CMP.........  12 months..  meloxicam................  Not Found    Overdue    [employer] Embedded Care Due Messages. Reference number: [id number].   1/13/2024 2:40:58 PM CST

## 2024-01-14 RX ORDER — ATOMOXETINE 25 MG/1
CAPSULE ORAL
Qty: 30 CAPSULE | Refills: 0 | Status: SHIPPED | OUTPATIENT
Start: 2024-01-14

## 2024-02-05 ENCOUNTER — TELEPHONE (OUTPATIENT)
Dept: INTERNAL MEDICINE | Facility: CLINIC | Age: 79
End: 2024-02-05
Payer: MEDICARE

## 2024-02-05 DIAGNOSIS — M25.561 RIGHT MEDIAL KNEE PAIN: ICD-10-CM

## 2024-02-05 RX ORDER — MELOXICAM 15 MG/1
15 TABLET ORAL DAILY PRN
Qty: 30 TABLET | Refills: 1 | Status: SHIPPED | OUTPATIENT
Start: 2024-02-05 | End: 2024-04-03

## 2024-02-05 NOTE — TELEPHONE ENCOUNTER
----- Message from Molina Earl sent at 2/5/2024 11:18 AM CST -----  Contact: Self [phone]  Prescription refill request.  RX name and strength (copy/paste from chart):   meloxicam (MOBIC) 15 MG tablet     Pharmacy name and phone # (copy/paste from chart):   [id number] [place] [address] AT [address]  Additional information:   Pt will be making an annual appt in the next few days.

## 2024-02-22 ENCOUNTER — TELEPHONE (OUTPATIENT)
Dept: INTERNAL MEDICINE | Facility: CLINIC | Age: 79
End: 2024-02-22
Payer: MEDICARE

## 2024-02-22 DIAGNOSIS — Z00.00 ROUTINE PHYSICAL EXAMINATION: Primary | ICD-10-CM

## 2024-02-22 NOTE — TELEPHONE ENCOUNTER
----- Message from Farrukh Friend sent at 2/22/2024  1:46 PM CST -----  Contact: self  [phone]  Caller is requesting to schedule their annual screening mammogram appointment. Order is not listed in Epic.  Please enter order and contact patient to schedule.  Would the patient like a call back, or a response through their [facility] portal?:   call    Please call and advise

## 2024-02-23 ENCOUNTER — OFFICE VISIT (OUTPATIENT)
Dept: OBSTETRICS AND GYNECOLOGY | Facility: CLINIC | Age: 79
End: 2024-02-23
Payer: MEDICARE

## 2024-02-23 VITALS
BODY MASS INDEX: 23.49 KG/M2 | SYSTOLIC BLOOD PRESSURE: 141 MMHG | HEIGHT: 66 IN | WEIGHT: 146.19 LBS | DIASTOLIC BLOOD PRESSURE: 97 MMHG

## 2024-02-23 DIAGNOSIS — B37.31 YEAST VAGINITIS: Primary | ICD-10-CM

## 2024-02-23 PROCEDURE — 3080F DIAST BP >= 90 MM HG: CPT | Mod: HCNC,CPTII,S$GLB, | Performed by: FAMILY MEDICINE

## 2024-02-23 PROCEDURE — 99213 OFFICE O/P EST LOW 20 MIN: CPT | Mod: HCNC,S$GLB,, | Performed by: FAMILY MEDICINE

## 2024-02-23 PROCEDURE — 1159F MED LIST DOCD IN RCRD: CPT | Mod: HCNC,CPTII,S$GLB, | Performed by: FAMILY MEDICINE

## 2024-02-23 PROCEDURE — 1160F RVW MEDS BY RX/DR IN RCRD: CPT | Mod: HCNC,CPTII,S$GLB, | Performed by: FAMILY MEDICINE

## 2024-02-23 PROCEDURE — 99999 PR PBB SHADOW E&M-EST. PATIENT-LVL III: CPT | Mod: PBBFAC,HCNC,, | Performed by: FAMILY MEDICINE

## 2024-02-23 PROCEDURE — 1126F AMNT PAIN NOTED NONE PRSNT: CPT | Mod: HCNC,CPTII,S$GLB, | Performed by: FAMILY MEDICINE

## 2024-02-23 PROCEDURE — 3077F SYST BP >= 140 MM HG: CPT | Mod: HCNC,CPTII,S$GLB, | Performed by: FAMILY MEDICINE

## 2024-02-23 PROCEDURE — 1101F PT FALLS ASSESS-DOCD LE1/YR: CPT | Mod: HCNC,CPTII,S$GLB, | Performed by: FAMILY MEDICINE

## 2024-02-23 PROCEDURE — 3288F FALL RISK ASSESSMENT DOCD: CPT | Mod: HCNC,CPTII,S$GLB, | Performed by: FAMILY MEDICINE

## 2024-02-23 RX ORDER — TERCONAZOLE 4 MG/G
1 CREAM VAGINAL NIGHTLY
Qty: 45 G | Refills: 0 | Status: SHIPPED | OUTPATIENT
Start: 2024-02-23 | End: 2024-03-01

## 2024-02-23 NOTE — PROGRESS NOTES
CC: Vaginitis  HPI: Patient presents for evaluation of an abnormal vaginal irritation. Symptoms have been present for a few days. Vaginal symptoms: local irritation and vulvar itching. Contraception: post menopausal status. She denies discharge, odor, and urinary symptoms of dysuria, hematuria, and fever . Sexually transmitted infection risk: very low risk of STD exposure. SA male partner, does not use condoms. Newly SA again, has been taking bubble baths and wearing non cotton underwear.   This is the extent of the patient's complaints at this time.     ROS:  GENERAL: No fever, chills, fatigability or weight loss.  VULVAR: No pain, no lesions and + itching.  VAGINAL: No relaxation, no itching, no discharge, no abnormal bleeding and no lesions.  URINARY: No incontinence, no nocturia, no frequency and no dysuria.     PHYSICAL EXAM:  Physical Exam:   Constitutional: She appears well-developed and well-nourished. She does not appear ill. No distress.               Genitourinary:    Vagina, uterus, right adnexa and left adnexa normal.      Pelvic exam was performed with patient in the lithotomy position.   The external female genitalia was normal.     Labial bartholins normal.There is rash on the right labia. There is no tenderness or lesion on the right labia. There is rash on the left labia. There is no tenderness or lesion on the left labia. Cervix is normal. Right adnexum displays no mass, no tenderness and no fullness. Left adnexum displays no mass, no tenderness and no fullness. There is cystocele (she is aware of this) in the vagina. No vaginal discharge, tenderness, bleeding, rectocele or prolapse of vaginal walls in the vagina.    No foreign body in the vagina.   Vaginal atrophy noted. Cervix exhibits no motion tenderness, no lesion, no discharge, no friability and no polyp. Normal urethral meatus.Urethra findings: no urethral mass   Genitourinary Comments: Vuvlar erythema bilat                    Neurological: She is alert. GCS eye subscore is 4. GCS verbal subscore is 5. GCS motor subscore is 6.           Past Medical History:   Diagnosis Date    Anxiety 8/13/2019    Other and unspecified hyperlipidemia 10/29/2013    Recurrent major depressive disorder, in remission 11/30/2020    Urge incontinence 2/9/2017    Uterovaginal prolapse, incomplete 2/9/2017    Venous insufficiency of both lower extremities     Vitamin D deficiency 9/6/2017       Past Surgical History:   Procedure Laterality Date    APPENDECTOMY  age 18    AUGMENTATION OF BREAST Bilateral 1988    Breast augmentation  1988    COSMETIC SURGERY  03/08/2016    facelift    DILATION AND CURETTAGE OF UTERUS      MYOMECTOMY         Family History   Problem Relation Age of Onset    Breast cancer Maternal Aunt     No Known Problems Father     No Known Problems Mother     Diabetes Sister     Schizophrenia Son     Heart disease Sister     No Known Problems Son     Colon cancer Neg Hx     Hypertension Neg Hx     Ovarian cancer Neg Hx     Stroke Neg Hx        Social History     Socioeconomic History    Marital status:    Tobacco Use    Smoking status: Never    Smokeless tobacco: Never   Substance and Sexual Activity    Alcohol use: Yes     Alcohol/week: 1.0 standard drink of alcohol     Types: 1 Drinks containing 0.5 oz of alcohol per week     Comment: maybe 1 glass of liquer weekly    Drug use: No    Sexual activity: Not Currently     Partners: Male     Birth control/protection: Post-menopausal     Comment:  2015     Social Determinants of Health     Financial Resource Strain: Low Risk  (12/5/2023)    Overall Financial Resource Strain (CARDIA)     Difficulty of Paying Living Expenses: Not hard at all   Food Insecurity: No Food Insecurity (12/5/2023)    Hunger Vital Sign     Worried About Running Out of Food in the Last Year: Never true     Ran Out of Food in the Last Year: Never true   Transportation Needs: No Transportation Needs (12/5/2023)     PRAPARE - Transportation     Lack of Transportation (Medical): No     Lack of Transportation (Non-Medical): No   Physical Activity: Inactive (12/5/2023)    Exercise Vital Sign     Days of Exercise per Week: 0 days     Minutes of Exercise per Session: 0 min   Stress: Stress Concern Present (12/5/2023)    [employer] - Occupational Stress Questionnaire     Feeling of Stress : Very much   Social Connections: Unknown (12/5/2023)    Social Connection and Isolation Panel [NHANES]     Frequency of Communication with Friends and Family: More than three times a week     Frequency of Social Gatherings with Friends and Family: Twice a week     Attends Club or Organization Meetings: More than 4 times per year     Marital Status:    Recent Concern: Social Connections - Moderately Isolated (12/5/2023)    Social Connection and Isolation Panel [NHANES]     Frequency of Communication with Friends and Family: More than three times a week     Frequency of Social Gatherings with Friends and Family: Twice a week     Attends Bahai Services: Never     Active Member of Clubs or Organizations: No     Attends Club or Organization Meetings: More than 4 times per year     Marital Status:    Housing Stability: Low Risk  (12/5/2023)    Housing Stability Vital Sign     Unable to Pay for Housing in the Last Year: No     Number of Places Lived in the Last Year: 1     Unstable Housing in the Last Year: No       Current Outpatient Medications   Medication Sig Dispense Refill    atomoxetine (STRATTERA) 25 MG capsule TAKE 1 CAPSULE(25 MG) BY MOUTH EVERY DAY 30 capsule 0    calcium-vitamin D3-vitamin K 500-500-40 mg-unit-mcg Chew Take 1 tablet by mouth once daily.      cartilage/collagen/bor/hyalur (JOINT HEALTH ORAL) Take by mouth.      Chol/Gl/Ser/RNA/Phen/Prg/Hb150 (SHARPER FOCUS ORAL) Take by mouth.      cholecalciferol, vitamin D3, 2,000 unit Cap Take 1,000 Units by mouth once daily.      conjugated  estrogens (PREMARIN) vaginal cream PLACE 0.5 GRAMS IN THE VAGINA THREE TIMES A WEEK AS DIRECTED 60 g 4    LACTOBACILLUS ACIDOPHILUS (PROBIOTIC ORAL) Take by mouth.      meloxicam (MOBIC) 15 MG tablet Take 1 tablet (15 mg total) by mouth daily as needed for Pain (Take with food). 30 tablet 1    MV-MN/VITC/ASBNA/GLU/KWAME/ ([place], ASCORBATE SODIUM, ORAL) Take by mouth.      omega 3-dha-epa-fish oil 100-160-1,000 mg Cap       tretinoin (RETIN-A) 0.05 % cream APPLY PEA SIZED DROP TO FACE NIGHTLY. WASH OFF INNTHE MORNING. SFP ALWAYS      TURMERIC, BULK, MISC Take 1,000 mg by mouth once daily.      fluconazole (DIFLUCAN) 150 MG Tab Take one by mouth and repeat at 3 days. (Patient not taking: Reported on 2024) 2 tablet 0    glucosamine avila 2KCl-chondroit 750-600 mg Tab Take 1 tablet by mouth 2 (two) times a day. Osteo Biflex (Patient not taking: Reported on 2024)      hydrOXYzine HCL (ATARAX) 25 MG tablet Take 1 pill by mouth once or twice daily for anxiety. (Patient not taking: Reported on 2024) 60 tablet 3    NON FORMULARY MEDICATION Daily. Nutrafol      terconazole (TERAZOL 7) 0.4 % Crea Place 1 applicator vaginally every evening. Can apply extra externally for 7 days 45 g 0    tobramycin sulfate 0.3% (TOBREX) 0.3 % ophthalmic solution 1-2 drops topically twice daily to affected toe(s). (Patient not taking: Reported on 2024) 5 mL 3     No current facility-administered medications for this visit.       Review of patient's allergies indicates:   Allergen Reactions    Acrylates/beheneth-25 methacrylate copolymer      Fingers get red and inflammed (fake nails)    Hydrocortisone Itching    Methylprednisolone aceponate     Prednisone Nausea And Vomiting and Itching    Tixocortol pivalate Itching          OB History    Para Term  AB Living   2 2 2     2   SAB IAB Ectopic Multiple Live Births           2      # Outcome Date GA Lbr Curt/2nd Weight Sex Delivery Anes PTL Lv   2 Term       Vag-Spont  N BARBY   1 Term      Vag-Spont  N BARBY          Assessment/Plan:    Yeast vaginitis  -     terconazole (TERAZOL 7) 0.4 % Crea; Place 1 applicator vaginally every evening. Can apply extra externally for 7 days  Dispense: 45 g; Refill: 0    Discussed unsure of insurance coverage with vaginosis swab. Pt would like to defer      Patient was counseled today on vaginitis prevention including :  a. avoiding feminine products such as deoderant soaps, body wash, bubble bath, douches, scented toilet paper, deoderant tampons or pads, feminine wipes, chronic pad use, etc.  b. avoiding other vulvovaginal irritants such as long hot baths, humidity, tight, synthetic clothing, chlorine and sitting around in wet bathing suits  c. wearing cotton underwear, avoiding thong underwear and no underwear to bed  d. taking showers instead of baths and use a hair dryer on cool setting afterwards to dry  e. wearing cotton to exercise and shower immediately after exercise and change clothes  f. using polyurethane condoms without spermicide if sexually active and symptoms are triggered by intercourse     FOLLOW UP: PRN/lack of improvement.

## 2024-02-24 ENCOUNTER — NURSE TRIAGE (OUTPATIENT)
Dept: ADMINISTRATIVE | Facility: CLINIC | Age: 79
End: 2024-02-24
Payer: MEDICARE

## 2024-02-24 NOTE — TELEPHONE ENCOUNTER
[place]    PCP:  Dr. Tucker Ren    Pt reports seen by OB/GYN, Andrey Stark NP yesterday for Yeast Vaginitis.  She was prescribed Terconazole 0.4% vaginal nightly and externally prn.  She picked up Metronidazole 0.75% vaginal gel vaginal nightly.  Pt reports prescribed med made the symptoms worse.  She states that she cannot use Monistat.  C/O worsening vaginal itching and vaginal redness.  Denies vaginal bleeding, fever, pain with urination, genital area injury, and vaginal FB.  Pt states that she did stop the prescribed medication.  NT attempted to complete triage but Pt refused triage at this time.  Pt states that she has to leave to go somewhere and that she doesn't have time to complete triage at this time.  Advised to call for worsening/questions/concerns.  VU.    Reason for Disposition   [1] Follow-up call to recent contact AND [2] information only call, no triage required    Additional Information   Negative: RN needs further essential information from caller in order to complete triage   Negative: Requesting regular office appointment   Negative: [1] Caller requesting NON-URGENT health information AND [2] PCP's office is the best resource   Negative: Health Information question, no triage required and triager able to answer question   Negative: General information question, no triage required and triager able to answer question   Negative: Question about upcoming scheduled test, no triage required and triager able to answer question   Negative: [1] Caller is not with the adult (patient) AND [2] probable NON-URGENT symptoms    Protocols used: Information Only Call - No Triage[facility]

## 2024-02-26 ENCOUNTER — TELEPHONE (OUTPATIENT)
Dept: OBSTETRICS AND GYNECOLOGY | Facility: CLINIC | Age: 79
End: 2024-02-26
Payer: MEDICARE

## 2024-02-26 DIAGNOSIS — B37.31 YEAST VAGINITIS: Primary | ICD-10-CM

## 2024-02-26 RX ORDER — FLUCONAZOLE 150 MG/1
150 TABLET ORAL ONCE
Qty: 1 TABLET | Refills: 0 | Status: SHIPPED | OUTPATIENT
Start: 2024-02-26 | End: 2024-02-26

## 2024-02-26 NOTE — TELEPHONE ENCOUNTER
Spoke with pt, pharmacy gave her wrong rx initially that burned, she tried to use terazol after but burned still. Diflucan sent

## 2024-02-27 ENCOUNTER — HOSPITAL ENCOUNTER (OUTPATIENT)
Dept: RADIOLOGY | Facility: HOSPITAL | Age: 79
Discharge: HOME | End: 2024-02-27
Attending: INTERNAL MEDICINE
Payer: MEDICARE

## 2024-02-27 DIAGNOSIS — Z12.39 ENCOUNTER FOR OTHER SCREENING FOR MALIGNANT NEOPLASM OF BREAST: ICD-10-CM

## 2024-02-27 DIAGNOSIS — Z12.31 ENCOUNTER FOR SCREENING MAMMOGRAM FOR MALIGNANT NEOPLASM OF BREAST: ICD-10-CM

## 2024-02-27 PROCEDURE — 77067 SCR MAMMO BI INCL CAD: CPT | Mod: TC,HCNC

## 2024-02-27 PROCEDURE — 77067 SCR MAMMO BI INCL CAD: CPT | Mod: 26,HCNC,, | Performed by: RADIOLOGY

## 2024-02-27 PROCEDURE — 77063 BREAST TOMOSYNTHESIS BI: CPT | Mod: 26,HCNC,, | Performed by: RADIOLOGY

## 2024-03-04 ENCOUNTER — PATIENT MESSAGE (OUTPATIENT)
Dept: INTERNAL MEDICINE | Facility: CLINIC | Age: 79
End: 2024-03-04
Payer: MEDICARE

## 2024-03-18 ENCOUNTER — OFFICE VISIT (OUTPATIENT)
Dept: INTERNAL MEDICINE | Facility: CLINIC | Age: 79
End: 2024-03-18
Payer: MEDICARE

## 2024-03-18 VITALS
OXYGEN SATURATION: 98 % | BODY MASS INDEX: 22.92 KG/M2 | SYSTOLIC BLOOD PRESSURE: 138 MMHG | HEIGHT: 66 IN | DIASTOLIC BLOOD PRESSURE: 86 MMHG | WEIGHT: 142.63 LBS | HEART RATE: 76 BPM

## 2024-03-18 DIAGNOSIS — E78.5 HYPERLIPIDEMIA, UNSPECIFIED HYPERLIPIDEMIA TYPE: ICD-10-CM

## 2024-03-18 DIAGNOSIS — M17.11 PRIMARY OSTEOARTHRITIS OF RIGHT KNEE: ICD-10-CM

## 2024-03-18 DIAGNOSIS — I87.2 VENOUS INSUFFICIENCY OF BOTH LOWER EXTREMITIES: ICD-10-CM

## 2024-03-18 DIAGNOSIS — Z00.00 ENCOUNTER FOR MEDICARE ANNUAL WELLNESS EXAM: ICD-10-CM

## 2024-03-18 DIAGNOSIS — Z00.00 ENCOUNTER FOR PREVENTIVE HEALTH EXAMINATION: Primary | ICD-10-CM

## 2024-03-18 DIAGNOSIS — M81.0 AGE-RELATED OSTEOPOROSIS WITHOUT CURRENT PATHOLOGICAL FRACTURE: ICD-10-CM

## 2024-03-18 DIAGNOSIS — F33.40 RECURRENT MAJOR DEPRESSIVE DISORDER, IN REMISSION: ICD-10-CM

## 2024-03-18 DIAGNOSIS — E55.9 VITAMIN D DEFICIENCY: ICD-10-CM

## 2024-03-18 DIAGNOSIS — F41.9 ANXIETY: ICD-10-CM

## 2024-03-18 PROCEDURE — 3079F DIAST BP 80-89 MM HG: CPT | Mod: HCNC,CPTII,S$GLB, | Performed by: NURSE PRACTITIONER

## 2024-03-18 PROCEDURE — G0439 PPPS, SUBSEQ VISIT: HCPCS | Mod: HCNC,S$GLB,, | Performed by: NURSE PRACTITIONER

## 2024-03-18 PROCEDURE — 99999 PR PBB SHADOW E&M-EST. PATIENT-LVL V: CPT | Mod: PBBFAC,HCNC,, | Performed by: NURSE PRACTITIONER

## 2024-03-18 PROCEDURE — 1160F RVW MEDS BY RX/DR IN RCRD: CPT | Mod: HCNC,CPTII,S$GLB, | Performed by: NURSE PRACTITIONER

## 2024-03-18 PROCEDURE — 1159F MED LIST DOCD IN RCRD: CPT | Mod: HCNC,CPTII,S$GLB, | Performed by: NURSE PRACTITIONER

## 2024-03-18 PROCEDURE — 3075F SYST BP GE 130 - 139MM HG: CPT | Mod: HCNC,CPTII,S$GLB, | Performed by: NURSE PRACTITIONER

## 2024-03-18 PROCEDURE — 1170F FXNL STATUS ASSESSED: CPT | Mod: HCNC,CPTII,S$GLB, | Performed by: NURSE PRACTITIONER

## 2024-03-18 PROCEDURE — 1101F PT FALLS ASSESS-DOCD LE1/YR: CPT | Mod: HCNC,CPTII,S$GLB, | Performed by: NURSE PRACTITIONER

## 2024-03-18 PROCEDURE — 1125F AMNT PAIN NOTED PAIN PRSNT: CPT | Mod: HCNC,CPTII,S$GLB, | Performed by: NURSE PRACTITIONER

## 2024-03-18 PROCEDURE — 3288F FALL RISK ASSESSMENT DOCD: CPT | Mod: HCNC,CPTII,S$GLB, | Performed by: NURSE PRACTITIONER

## 2024-03-18 NOTE — PROGRESS NOTES
"  Katiana Hagan presented for a follow-up Medicare AWV today. The following components were reviewed and updated:    Medical history  Family History  Social history  Allergies and Current Medications  Health Risk Assessment  Health Maintenance  Care Team    **See Completed Assessments for Annual Wellness visit with in the encounter summary    The following assessments were completed:  Depression Screening  Cognitive function Screening    Timed Get Up Test  Whisper Test      Opioid documentation:      Patient does not have a current opioid prescription.          Vitals:    03/18/24 1509 03/18/24 1530   BP:  138/86   BP Location:  Left arm   Pulse:  76   SpO2:  98%   Weight: 64.7 kg (142 lb 10.2 oz)    Height: 5' 6" (1.676 m)      Body mass index is 23.02 kg/m².       Physical Exam  Vitals and nursing note reviewed.   Constitutional:       Appearance: She is well-developed.   HENT:      Head: Normocephalic.   Cardiovascular:      Rate and Rhythm: Normal rate and regular rhythm.      Heart sounds: Normal heart sounds. No murmur heard.  Pulmonary:      Effort: Pulmonary effort is normal.      Breath sounds: Normal breath sounds.   Abdominal:      General: Bowel sounds are normal.      Palpations: Abdomen is soft.   Musculoskeletal:         General: Normal range of motion.   Skin:     General: Skin is warm and dry.   Neurological:      Mental Status: She is alert and oriented to person, place, and time.      Motor: No abnormal muscle tone.   Psychiatric:         Mood and Affect: Mood normal.            Diagnoses and health risks identified today and associated recommendations/orders:    1. Encounter for preventive health examination  Here for Health Risk Assessment/Annual Wellness Visit.  Health maintenance reviewed and updated. Follow up in one year.     2. Hyperlipidemia, unspecified hyperlipidemia type  Chronic, stable with diet. Followed by PCP.    3. Venous insufficiency of both lower extremities  Chronic, stable. " Followed by PCP.    4. Recurrent major depressive disorder, in remission  Chronic, stable. PHQ-2 score 1/2. Followed by PCP.    5. Anxiety  Chronic, stable. PHQ-2 score 1/2. Followed by PCP.    6. Vitamin D deficiency  Chronic, stable on current medication  s. Followed by PCP.     7. BMI 24.0-24.9, adult  Chronic, weight stable. Followed by PCP.    8. Primary osteoarthritis of right knee  Chronic, recent exacerbation of pain. Seen in Orthopedics. Improved with meloxicam. Followed by PCP,  Orthopedics,     9. Age-related osteoporosis without current pathological fracture  Chronic, she will discuss treatment at next PCP visit. Followed by PCP.    10. Encounter for Medicare annual wellness exam  - Ambulatory Referral/Consult to Enhanced Annual Wellness Visit (eAWV)      Provided Katiana with a 5-10 year written screening schedule and personal prevention plan. Recommendations were developed using the USPSTF age appropriate recommendations. Education, counseling, and referrals were provided as needed.  After Visit Summary printed and given to patient which includes a list of additional screenings\tests needed.    Follow up in 4 days (on 3/22/2024).with PCP      Payal Godfrey NP

## 2024-03-18 NOTE — PATIENT INSTRUCTIONS
Counseling and Referral of Other Preventative  (Italic type indicates deductible and co-insurance are waived)    Patient Name: Katiana Hagan  Today's Date: 3/18/2024             No orders of the defined types were placed in this encounter.    The following information is provided to all patients.  This information is to help you find resources for any of the problems found today that may be affecting your health:                  Living healthy guide: www.[facility].[place].[facility]      Understanding Diabetes: www.diabetes.org      Eating healthy: www.cdc.gov/healthyweight      [facility] home safety checklist: www.cdc.gov/steadi/patient.html      Agency on Aging: www.goea.[place].[facility]      Alcoholics anonymous (AA): www.aa.org      Physical Activity: www.selam.nih.gov/ox2updy      Tobacco use: www.quitwithusla.org

## 2024-03-19 ENCOUNTER — TELEPHONE (OUTPATIENT)
Dept: INTERNAL MEDICINE | Facility: CLINIC | Age: 79
End: 2024-03-19
Payer: MEDICARE

## 2024-03-19 DIAGNOSIS — F41.9 ANXIETY: ICD-10-CM

## 2024-03-19 DIAGNOSIS — E55.9 VITAMIN D DEFICIENCY: ICD-10-CM

## 2024-03-19 DIAGNOSIS — E78.5 HYPERLIPIDEMIA, UNSPECIFIED HYPERLIPIDEMIA TYPE: Primary | ICD-10-CM

## 2024-03-19 NOTE — TELEPHONE ENCOUNTER
----- Message from Payal Godfrey NP sent at 3/18/2024  4:55 PM CDT -----  She has an appointment with Dr. Ren for this Friday and would like to have lab drawn prior to appointment.  Thanks

## 2024-03-25 DIAGNOSIS — N95.2 POSTMENOPAUSAL ATROPHIC VAGINITIS: ICD-10-CM

## 2024-03-25 RX ORDER — CONJUGATED ESTROGENS 0.62 MG/G
CREAM VAGINAL
Qty: 60 G | Refills: 4 | Status: SHIPPED | OUTPATIENT
Start: 2024-03-25

## 2024-04-03 DIAGNOSIS — M25.561 RIGHT MEDIAL KNEE PAIN: ICD-10-CM

## 2024-04-03 RX ORDER — MELOXICAM 15 MG/1
TABLET ORAL
Qty: 30 TABLET | Refills: 1 | Status: SHIPPED | OUTPATIENT
Start: 2024-04-03 | End: 2024-06-07

## 2024-04-03 NOTE — TELEPHONE ENCOUNTER
Care Due:                  Date            Visit Type   Department     Provider  --------------------------------------------------------------------------------                                ESTABLISHED                              PATIENT -    [facility]  Last Visit: 12-      [facility]       Tucker Ren  Next Visit: None Scheduled  None         None Found                                                            Last  Test          Frequency    Reason                     Performed    Due Date  --------------------------------------------------------------------------------    CBC.........  12 months..  meloxicam................  02- 02-    CMP.........  12 months..  meloxicam................  Not Found    Overdue    [employer] Embedded Care Due Messages. Reference number: [id number].   4/03/2024 10:07:27 AM CDT

## 2024-06-07 DIAGNOSIS — M25.561 RIGHT MEDIAL KNEE PAIN: ICD-10-CM

## 2024-06-07 RX ORDER — MELOXICAM 15 MG/1
TABLET ORAL
Qty: 30 TABLET | Refills: 1 | Status: SHIPPED | OUTPATIENT
Start: 2024-06-07

## 2024-06-07 NOTE — TELEPHONE ENCOUNTER
Care Due:                  Date            Visit Type   Department     Provider  --------------------------------------------------------------------------------                                ESTABLISHED                              PATIENT -    [facility]  Last Visit: 12-      [facility]       Tucker Ren  Next Visit: None Scheduled  None         None Found                                                            Last  Test          Frequency    Reason                     Performed    Due Date  --------------------------------------------------------------------------------    CBC.........  12 months..  meloxicam................  02- 02-    CMP.........  12 months..  meloxicam................  Not Found    Overdue    [employer] Embedded Care Due Messages. Reference number: [id number].   6/07/2024 10:07:44 AM CDT

## 2024-06-07 NOTE — TELEPHONE ENCOUNTER
Refill Routing Note   Medication(s) are not appropriate for processing by [facility] for the following reason(s):        Outside of protocol    [facility] action(s):  Route             Appointments  past 12m or future 3m with PCP    Date Provider   Last Visit   12/5/2023 Tucker Ren MD   Next Visit   Visit date not found Tucker Ren MD   ED visits in past 90 days: 0        Note composed:10:49 AM 06/07/2024

## 2024-06-10 ENCOUNTER — PATIENT MESSAGE (OUTPATIENT)
Dept: INTERNAL MEDICINE | Facility: CLINIC | Age: 79
End: 2024-06-10
Payer: MEDICARE

## 2024-06-12 ENCOUNTER — TELEPHONE (OUTPATIENT)
Dept: INTERNAL MEDICINE | Facility: CLINIC | Age: 79
End: 2024-06-12
Payer: MEDICARE

## 2024-06-12 NOTE — TELEPHONE ENCOUNTER
----- Message from Alia Landeros sent at 6/12/2024  2:57 PM CDT -----  Contact: Self/ [phone]  type: Lab    Caller is requesting to schedule their Lab appointment prior to annual appointment.  Order is not listed in EPIC.  Please enter order and contact patient to schedule.    Name of Caller:Katiana Suarez Date and Time of Labs:7/3, 9:00 am     Date of Annual Physical Appointment:7/10     Where would they like the lab performed?[facility]     Would the patient rather a call back or a response via My [facility]? Call back     Best Call Back Number:[phone]

## 2024-07-02 ENCOUNTER — OFFICE VISIT (OUTPATIENT)
Dept: PODIATRY | Facility: CLINIC | Age: 79
End: 2024-07-02
Payer: MEDICARE

## 2024-07-02 VITALS
WEIGHT: 142.63 LBS | HEIGHT: 66 IN | DIASTOLIC BLOOD PRESSURE: 81 MMHG | SYSTOLIC BLOOD PRESSURE: 144 MMHG | HEART RATE: 83 BPM | BODY MASS INDEX: 22.92 KG/M2

## 2024-07-02 DIAGNOSIS — B35.1 ONYCHOMYCOSIS DUE TO DERMATOPHYTE: Primary | ICD-10-CM

## 2024-07-02 DIAGNOSIS — I83.893 VARICOSE VEINS OF BOTH LEGS WITH EDEMA: ICD-10-CM

## 2024-07-02 PROCEDURE — 1101F PT FALLS ASSESS-DOCD LE1/YR: CPT | Mod: HCNC,CPTII,S$GLB, | Performed by: PODIATRIST

## 2024-07-02 PROCEDURE — 3077F SYST BP >= 140 MM HG: CPT | Mod: HCNC,CPTII,S$GLB, | Performed by: PODIATRIST

## 2024-07-02 PROCEDURE — 1126F AMNT PAIN NOTED NONE PRSNT: CPT | Mod: HCNC,CPTII,S$GLB, | Performed by: PODIATRIST

## 2024-07-02 PROCEDURE — 99999 PR PBB SHADOW E&M-EST. PATIENT-LVL III: CPT | Mod: PBBFAC,HCNC,, | Performed by: PODIATRIST

## 2024-07-02 PROCEDURE — 3079F DIAST BP 80-89 MM HG: CPT | Mod: HCNC,CPTII,S$GLB, | Performed by: PODIATRIST

## 2024-07-02 PROCEDURE — 3288F FALL RISK ASSESSMENT DOCD: CPT | Mod: HCNC,CPTII,S$GLB, | Performed by: PODIATRIST

## 2024-07-02 PROCEDURE — 99214 OFFICE O/P EST MOD 30 MIN: CPT | Mod: HCNC,S$GLB,, | Performed by: PODIATRIST

## 2024-07-02 PROCEDURE — 1159F MED LIST DOCD IN RCRD: CPT | Mod: HCNC,CPTII,S$GLB, | Performed by: PODIATRIST

## 2024-07-02 RX ORDER — CICLOPIROX 80 MG/ML
SOLUTION TOPICAL NIGHTLY
Qty: 6.6 ML | Refills: 11 | Status: SHIPPED | OUTPATIENT
Start: 2024-07-02

## 2024-07-02 NOTE — PROGRESS NOTES
Subjective:      Patient ID: Katiana Hagan is a 78 y.o. female.    Chief Complaint: Nail Problem (R toenail coming off)    Discolored loose toenail on the right with debris beneath and sometimes pain when it catches on socks or shoes.  Longstanding condition gradually on and off for many years.  Past few weeks if seeing this when worsen on the right big toe.  Denies trauma and surgery both feet.  No self-treatment.  No prior medical treatment.      Cc2  worsening varicose veins and swelling of the lower legs feet and ankles bilateral.  This is a chronic condition worsening over the past several years.  Aggravated by increased time spent on her feet and she feel like it just time.  She denies trauma and surgery both feet.  No prior medical treatment.  No self-treatment.    Review of Systems   Constitutional: Negative for chills, diaphoresis, fever, malaise/fatigue and night sweats.   Cardiovascular:  Positive for leg swelling. Negative for claudication, cyanosis and syncope.   Skin:  Positive for nail changes. Negative for color change, dry skin, rash, suspicious lesions and unusual hair distribution.   Musculoskeletal:  Negative for falls, joint pain, joint swelling, muscle cramps, muscle weakness and stiffness.   Gastrointestinal:  Negative for constipation, diarrhea, nausea and vomiting.   Neurological:  Negative for brief paralysis, disturbances in coordination, focal weakness, numbness, paresthesias, sensory change and tremors.           Objective:      Physical Exam  Constitutional:       General: She is not in acute distress.     Appearance: She is well-developed. She is not diaphoretic.   Cardiovascular:      Pulses:           Popliteal pulses are 2+ on the right side and 2+ on the left side.        Dorsalis pedis pulses are 2+ on the right side and 2+ on the left side.        Posterior tibial pulses are 2+ on the right side and 2+ on the left side.      Comments: Capillary refill 3 seconds all toes/distal  feet, all toes/both feet warm to touch.      Negative lymphadenopathy bilateral popliteal fossa and tarsal tunnel.     Less than 2+ pitting lower extremity edema bilateral.    Visible superficial varicosities without discernible visible or palpable thrombosis.  Bilateral lower legs.  Musculoskeletal:      Right ankle: No swelling, deformity, ecchymosis or lacerations. Normal range of motion. Normal pulse.      Right Achilles Tendon: Normal. No defects. Saxena's test negative.   Lymphadenopathy:      Lower Body: No right inguinal adenopathy. No left inguinal adenopathy.      Comments: Negative lymphadenopathy bilateral popliteal fossa and tarsal tunnel.    Negative lymphangitic streaking bilateral feet/ankles/legs.   Skin:     General: Skin is warm and dry.      Capillary Refill: Capillary refill takes 2 to 3 seconds.      Coloration: Skin is not pale.      Findings: No abrasion, bruising, burn, ecchymosis, erythema, laceration, lesion or rash.      Nails: There is no clubbing.      Comments: Skin thin, shiny, atrophic, with decreased density and distribution of pedal hair bilateral, but without hyperpigmentation, bette discoloration,  ulcers, masses, nodules or cords palpated bilateral feet and legs.    Toenails 1st  right are hypertrophic, dystrophic, discolored tanish brown with tan, gray crumbly subungual debris.  Tender to distal nail plate pressure, without periungual skin abnormality of each.       Neurological:      Mental Status: She is alert and oriented to person, place, and time.      Sensory: No sensory deficit.      Motor: No tremor, atrophy or abnormal muscle tone.      Gait: Gait normal.      Deep Tendon Reflexes:      Reflex Scores:       Patellar reflexes are 2+ on the right side and 2+ on the left side.       Achilles reflexes are 2+ on the right side and 2+ on the left side.     Comments: Negative tinel sign to percussion sural, superficial peroneal, deep peroneal, saphenous, and posterior  tibial nerves right and left ankles and feet.     Psychiatric:         Behavior: Behavior is cooperative.           Assessment:       Encounter Diagnoses   Name Primary?    Onychomycosis due to dermatophyte Yes    Varicose veins of both legs with edema          Plan:       Katiana was seen today for nail problem.    Diagnoses and all orders for this visit:    Onychomycosis due to dermatophyte  -     Ambulatory referral/consult to Vascular Surgery; Future    Varicose veins of both legs with edema  -     Ambulatory referral/consult to Vascular Surgery; Future    Other orders  -     ciclopirox (PENLAC) 8 % Soln; Apply topically nightly.      I counseled the patient on her conditions, their implications and medical management.        Self-care with topical fiber pumice stone of the right hallux toenail.  I trimmed as much as possible with a sterile Nipper today to ensure no injury or unknown wound which in fact is not present.      Topical Penlac once daily to help kill remaining fungus in the nail as it regrows.      Discussed conservative treatment with shoes of adequate dimensions, material, and style to alleviate symptoms and delay or prevent surgical intervention.          Varicosities:     Consult vascular surgery.      Recommend over-the-counter compression stockings placed on rising in the morning removed before bed.      Recommend elevation much as possible when stationary.          No follow-ups on file.

## 2024-07-03 ENCOUNTER — LAB VISIT (OUTPATIENT)
Dept: LAB | Facility: HOSPITAL | Age: 79
End: 2024-07-03
Attending: INTERNAL MEDICINE
Payer: MEDICARE

## 2024-07-03 DIAGNOSIS — E78.5 HYPERLIPIDEMIA, UNSPECIFIED HYPERLIPIDEMIA TYPE: ICD-10-CM

## 2024-07-03 DIAGNOSIS — E55.9 VITAMIN D DEFICIENCY: ICD-10-CM

## 2024-07-03 DIAGNOSIS — F41.9 ANXIETY: ICD-10-CM

## 2024-07-03 LAB
ALBUMIN SERPL BCP-MCNC: 4 G/DL (ref 3.5–5.2)
ALP SERPL-CCNC: 69 U/L (ref 55–135)
ALT SERPL W/O P-5'-P-CCNC: 16 U/L (ref 10–44)
ANION GAP SERPL CALC-SCNC: 9 MMOL/L (ref 8–16)
AST SERPL-CCNC: 16 U/L (ref 10–40)
BASOPHILS # BLD AUTO: 0.06 K/UL (ref 0–0.2)
BASOPHILS NFR BLD: 1.2 % (ref 0–1.9)
BILIRUB SERPL-MCNC: 0.8 MG/DL (ref 0.1–1)
BUN SERPL-MCNC: 19 MG/DL (ref 8–23)
CALCIUM SERPL-MCNC: 10.5 MG/DL (ref 8.7–10.5)
CHLORIDE SERPL-SCNC: 107 MMOL/L (ref 95–110)
CHOLEST SERPL-MCNC: 202 MG/DL (ref 120–199)
CHOLEST/HDLC SERPL: 3.6 {RATIO} (ref 2–5)
CO2 SERPL-SCNC: 25 MMOL/L (ref 23–29)
CREAT SERPL-MCNC: 1 MG/DL (ref 0.5–1.4)
DIFFERENTIAL METHOD BLD: NORMAL
EOSINOPHIL # BLD AUTO: 0.2 K/UL (ref 0–0.5)
EOSINOPHIL NFR BLD: 2.9 % (ref 0–8)
ERYTHROCYTE [DISTWIDTH] IN BLOOD BY AUTOMATED COUNT: 12.8 % (ref 11.5–14.5)
EST. GFR  (NO RACE VARIABLE): 58.4 ML/MIN/1.73 M^2
GLUCOSE SERPL-MCNC: 102 MG/DL (ref 70–110)
HCT VFR BLD AUTO: 47 % (ref 37–48.5)
HDLC SERPL-MCNC: 56 MG/DL (ref 40–75)
HDLC SERPL: 27.7 % (ref 20–50)
HGB BLD-MCNC: 15.6 G/DL (ref 12–16)
IMM GRANULOCYTES # BLD AUTO: 0.01 K/UL (ref 0–0.04)
IMM GRANULOCYTES NFR BLD AUTO: 0.2 % (ref 0–0.5)
LDLC SERPL CALC-MCNC: 123.2 MG/DL (ref 63–159)
LYMPHOCYTES # BLD AUTO: 1.6 K/UL (ref 1–4.8)
LYMPHOCYTES NFR BLD: 30.8 % (ref 18–48)
MCH RBC QN AUTO: 30.4 PG (ref 27–31)
MCHC RBC AUTO-ENTMCNC: 33.2 G/DL (ref 32–36)
MCV RBC AUTO: 92 FL (ref 82–98)
MONOCYTES # BLD AUTO: 0.5 K/UL (ref 0.3–1)
MONOCYTES NFR BLD: 10.3 % (ref 4–15)
NEUTROPHILS # BLD AUTO: 2.8 K/UL (ref 1.8–7.7)
NEUTROPHILS NFR BLD: 54.6 % (ref 38–73)
NONHDLC SERPL-MCNC: 146 MG/DL
NRBC BLD-RTO: 0 /100 WBC
PLATELET # BLD AUTO: 191 K/UL (ref 150–450)
PMV BLD AUTO: 12.1 FL (ref 9.2–12.9)
POTASSIUM SERPL-SCNC: 4.5 MMOL/L (ref 3.5–5.1)
PROT SERPL-MCNC: 6.8 G/DL (ref 6–8.4)
RBC # BLD AUTO: 5.13 M/UL (ref 4–5.4)
SODIUM SERPL-SCNC: 141 MMOL/L (ref 136–145)
TRIGL SERPL-MCNC: 114 MG/DL (ref 30–150)
TSH SERPL DL<=0.005 MIU/L-ACNC: 0.76 UIU/ML (ref 0.4–4)
WBC # BLD AUTO: 5.13 K/UL (ref 3.9–12.7)

## 2024-07-03 PROCEDURE — 85025 COMPLETE CBC W/AUTO DIFF WBC: CPT | Mod: HCNC | Performed by: INTERNAL MEDICINE

## 2024-07-03 PROCEDURE — 84443 ASSAY THYROID STIM HORMONE: CPT | Mod: HCNC | Performed by: INTERNAL MEDICINE

## 2024-07-03 PROCEDURE — 80053 COMPREHEN METABOLIC PANEL: CPT | Mod: HCNC | Performed by: INTERNAL MEDICINE

## 2024-07-03 PROCEDURE — 80061 LIPID PANEL: CPT | Mod: HCNC | Performed by: INTERNAL MEDICINE

## 2024-07-03 PROCEDURE — 36415 COLL VENOUS BLD VENIPUNCTURE: CPT | Mod: HCNC,PO | Performed by: INTERNAL MEDICINE

## 2024-07-10 ENCOUNTER — OFFICE VISIT (OUTPATIENT)
Dept: INTERNAL MEDICINE | Facility: CLINIC | Age: 79
End: 2024-07-10
Payer: MEDICARE

## 2024-07-10 VITALS
BODY MASS INDEX: 23.13 KG/M2 | WEIGHT: 143.94 LBS | HEART RATE: 78 BPM | HEIGHT: 66 IN | SYSTOLIC BLOOD PRESSURE: 118 MMHG | DIASTOLIC BLOOD PRESSURE: 68 MMHG | OXYGEN SATURATION: 96 %

## 2024-07-10 DIAGNOSIS — Z00.00 ROUTINE PHYSICAL EXAMINATION: Primary | ICD-10-CM

## 2024-07-10 DIAGNOSIS — F33.40 RECURRENT MAJOR DEPRESSIVE DISORDER, IN REMISSION: ICD-10-CM

## 2024-07-10 DIAGNOSIS — F41.9 ANXIETY: ICD-10-CM

## 2024-07-10 DIAGNOSIS — R07.89 CHEST PRESSURE: ICD-10-CM

## 2024-07-10 DIAGNOSIS — E78.5 HYPERLIPIDEMIA, UNSPECIFIED HYPERLIPIDEMIA TYPE: ICD-10-CM

## 2024-07-10 DIAGNOSIS — M25.561 RIGHT MEDIAL KNEE PAIN: ICD-10-CM

## 2024-07-10 PROCEDURE — 93010 ELECTROCARDIOGRAM REPORT: CPT | Mod: HCNC,S$GLB,, | Performed by: INTERNAL MEDICINE

## 2024-07-10 PROCEDURE — 1159F MED LIST DOCD IN RCRD: CPT | Mod: HCNC,CPTII,S$GLB, | Performed by: INTERNAL MEDICINE

## 2024-07-10 PROCEDURE — 99397 PER PM REEVAL EST PAT 65+ YR: CPT | Mod: HCNC,S$GLB,, | Performed by: INTERNAL MEDICINE

## 2024-07-10 PROCEDURE — 3078F DIAST BP <80 MM HG: CPT | Mod: HCNC,CPTII,S$GLB, | Performed by: INTERNAL MEDICINE

## 2024-07-10 PROCEDURE — 93005 ELECTROCARDIOGRAM TRACING: CPT | Mod: HCNC,S$GLB,, | Performed by: INTERNAL MEDICINE

## 2024-07-10 PROCEDURE — 1101F PT FALLS ASSESS-DOCD LE1/YR: CPT | Mod: HCNC,CPTII,S$GLB, | Performed by: INTERNAL MEDICINE

## 2024-07-10 PROCEDURE — 1126F AMNT PAIN NOTED NONE PRSNT: CPT | Mod: HCNC,CPTII,S$GLB, | Performed by: INTERNAL MEDICINE

## 2024-07-10 PROCEDURE — 99999 PR PBB SHADOW E&M-EST. PATIENT-LVL IV: CPT | Mod: PBBFAC,HCNC,, | Performed by: INTERNAL MEDICINE

## 2024-07-10 PROCEDURE — 3288F FALL RISK ASSESSMENT DOCD: CPT | Mod: HCNC,CPTII,S$GLB, | Performed by: INTERNAL MEDICINE

## 2024-07-10 PROCEDURE — 1160F RVW MEDS BY RX/DR IN RCRD: CPT | Mod: HCNC,CPTII,S$GLB, | Performed by: INTERNAL MEDICINE

## 2024-07-10 PROCEDURE — 3074F SYST BP LT 130 MM HG: CPT | Mod: HCNC,CPTII,S$GLB, | Performed by: INTERNAL MEDICINE

## 2024-07-10 RX ORDER — MELOXICAM 15 MG/1
TABLET ORAL
Qty: 30 TABLET | Refills: 3 | Status: SHIPPED | OUTPATIENT
Start: 2024-07-10

## 2024-07-10 NOTE — PROGRESS NOTES
Subjective:       Patient ID: Katiana Hagan is a 78 y.o. female.    Chief Complaint: Annual Exam    HPI: Pt seen for annual exam. Overall doing well. Labs reviewed and stable. Minimal chol elevation but improved.   Other labs all stable.  Prescriptions up-to-date.  She would like a refill of meloxicam but is not due for a few weeks.  The only unusual symptoms she mentions is for a while, maybe 6 months or longer periodically when she 1st lies she instantly gets a sensation a weighted blanket or a lead blank it on her chest and abdomen.  This goes away after a few minutes and it never occurs with walking sitting eating or exertion.  It does not last most of the night and comes on immediately when she lies down.  She does not have any back pain or arm pain.  No shortness a breath.  No rash it does not feel like indigestion to her.  No other exertional symptoms.      Review of Systems   Constitutional:  Negative for appetite change, chills and fever.   HENT:  Negative for nosebleeds and sore throat.    Eyes:  Negative for pain and visual disturbance.   Respiratory:  Negative for cough, shortness of breath and wheezing.    Cardiovascular:  Positive for chest pain (see hpi.). Negative for leg swelling.   Gastrointestinal:  Negative for abdominal pain, constipation and diarrhea.   Endocrine: Negative for polyuria.   Genitourinary:  Negative for difficulty urinating, hematuria and vaginal bleeding.   Musculoskeletal:  Negative for arthralgias, back pain, gait problem and neck pain.   Integumentary:  Negative for pallor and rash.   Neurological:  Negative for tremors, seizures and headaches.   Hematological:  Does not bruise/bleed easily.   Psychiatric/Behavioral:  Negative for dysphoric mood. The patient is not nervous/anxious.            Past Medical History:   Diagnosis Date    Anxiety 8/13/2019    Other and unspecified hyperlipidemia 10/29/2013    Recurrent major depressive disorder, in remission 11/30/2020    Urge  incontinence 2/9/2017    Uterovaginal prolapse, incomplete 2/9/2017    Venous insufficiency of both lower extremities     Vitamin D deficiency 9/6/2017     Past Surgical History:   Procedure Laterality Date    APPENDECTOMY  age 18    AUGMENTATION OF BREAST Bilateral 1988    Breast augmentation  1988    COSMETIC SURGERY  03/08/2016    facelift    DILATION AND CURETTAGE OF UTERUS      MYOMECTOMY        Patient Active Problem List   Diagnosis    Hyperlipidemia    Venous insufficiency of both lower extremities    Urge incontinence    Nocturia    Uterovaginal prolapse, incomplete    Vitamin D deficiency    Anxiety    BMI 24.0-24.9, adult    Recurrent major depressive disorder, in remission    Primary osteoarthritis of right knee    Age-related osteoporosis without current pathological fracture        Objective:      Physical Exam  Constitutional:       General: She is not in acute distress.     Appearance: She is well-developed.   HENT:      Head: Normocephalic and atraumatic.      Right Ear: Tympanic membrane, ear canal and external ear normal.      Left Ear: Tympanic membrane, ear canal and external ear normal.      Mouth/Throat:      Pharynx: No oropharyngeal exudate or posterior oropharyngeal erythema.   Eyes:      General: No scleral icterus.     Conjunctiva/sclera: Conjunctivae normal.      Pupils: Pupils are equal, round, and reactive to light.   Neck:      Thyroid: No thyromegaly.   Cardiovascular:      Rate and Rhythm: Normal rate and regular rhythm.      Pulses: Normal pulses.      Heart sounds: No murmur heard.     Comments: No reproducible chest symptoms on exam  Pulmonary:      Effort: Pulmonary effort is normal.      Breath sounds: Normal breath sounds. No wheezing.   Abdominal:      General: Bowel sounds are normal. There is no distension.      Palpations: Abdomen is soft.      Tenderness: There is no abdominal tenderness.   Musculoskeletal:         General: No tenderness.      Cervical back: Normal range  of motion and neck supple.      Right lower leg: No edema.      Left lower leg: No edema.   Lymphadenopathy:      Cervical: No cervical adenopathy.   Skin:     Coloration: Skin is not jaundiced.      Findings: No rash.   Neurological:      General: No focal deficit present.      Mental Status: She is alert and oriented to person, place, and time.   Psychiatric:         Mood and Affect: Mood normal.         Behavior: Behavior normal.         Assessment:       Problem List Items Addressed This Visit          Psychiatric    Anxiety    Recurrent major depressive disorder, in remission       Cardiac/Vascular    Hyperlipidemia    Relevant Orders    EKG 12-lead     Other Visit Diagnoses       Routine physical examination    -  Primary    Right medial knee pain        Relevant Medications    meloxicam (MOBIC) 15 MG tablet    Chest pressure        Only feels it in the moment she lies down for bed. Feels like a lead blanket and goes away after a while. Never with exertion or sitting, standing or eating.    Relevant Orders    EKG 12-lead            Plan:         Katiana was seen today for annual exam.    Diagnoses and all orders for this visit:    Routine physical examination    Right medial knee pain  -     meloxicam (MOBIC) 15 MG tablet; TAKE 1 TABLET(15 MG) BY MOUTH DAILY WITH FOOD AS NEEDED FOR PAIN    Hyperlipidemia, unspecified hyperlipidemia type  -     EKG 12-lead    Recurrent major depressive disorder, in remission    Anxiety    Chest pressure  Comments:  Only feels it in the moment she lies down for bed. Feels like a lead blanket and goes away after a while. Never with exertion or sitting, standing or eating.  Orders:  -     EKG 12-lead           Chest symptoms do not particularly sound cardiac but we will do an EKG based on the area and history of hyperlipidemia.    EKG is normal sinus rhythm, no acute changes.  We will await Cardiology review but patient aware preliminary result   Continue to monitor for any  "changes.  Follow-up      Portions of this note may have been created with voice recognition software. Occasional "wrong-word" or "sound-a-like" substitutions may have occurred due to the inherent limitations of voice recognition software. Please, read the note carefully and recognize, using context, where substitutions have occurred.  "

## 2024-07-11 ENCOUNTER — PATIENT MESSAGE (OUTPATIENT)
Dept: INTERNAL MEDICINE | Facility: CLINIC | Age: 79
End: 2024-07-11
Payer: MEDICARE

## 2024-07-11 LAB
OHS QRS DURATION: 82 MS
OHS QTC CALCULATION: 435 MS

## 2024-07-24 ENCOUNTER — OFFICE VISIT (OUTPATIENT)
Dept: OBSTETRICS AND GYNECOLOGY | Facility: CLINIC | Age: 79
End: 2024-07-24
Payer: MEDICARE

## 2024-07-24 VITALS
BODY MASS INDEX: 23.59 KG/M2 | WEIGHT: 146.19 LBS | DIASTOLIC BLOOD PRESSURE: 80 MMHG | SYSTOLIC BLOOD PRESSURE: 130 MMHG

## 2024-07-24 DIAGNOSIS — Z01.419 WELL WOMAN EXAM WITH ROUTINE GYNECOLOGICAL EXAM: Primary | ICD-10-CM

## 2024-07-24 DIAGNOSIS — L30.4 PRURITIC INTERTRIGO: ICD-10-CM

## 2024-07-24 DIAGNOSIS — Z12.11 COLON CANCER SCREENING: ICD-10-CM

## 2024-07-24 DIAGNOSIS — N95.2 POSTMENOPAUSAL ATROPHIC VAGINITIS: ICD-10-CM

## 2024-07-24 DIAGNOSIS — N81.2 UTEROVAGINAL PROLAPSE, INCOMPLETE: ICD-10-CM

## 2024-07-24 PROCEDURE — 99999 PR PBB SHADOW E&M-EST. PATIENT-LVL III: CPT | Mod: PBBFAC,HCNC,, | Performed by: OBSTETRICS & GYNECOLOGY

## 2024-07-24 RX ORDER — NYSTATIN 100000 [USP'U]/G
POWDER TOPICAL
Qty: 60 G | Refills: 3 | Status: SHIPPED | OUTPATIENT
Start: 2024-07-24 | End: 2025-07-24

## 2024-07-24 RX ORDER — CONJUGATED ESTROGENS 0.62 MG/G
CREAM VAGINAL
Qty: 60 G | Refills: 4 | Status: SHIPPED | OUTPATIENT
Start: 2024-07-24

## 2024-07-24 NOTE — PROGRESS NOTES
SUBJECTIVE:   Katiana Hagan is a 78 y.o. female   for annual well woman exam. Patient's last menstrual period was 1998 (approximate)..  She has no unusual complaints.      Menopause @ 52 age, denies HRT besides vaginal estrace recently, denies VB  Pt with incomplete vaginal prolapse - no symptoms  Having BM daily    Past Medical History:   Diagnosis Date    Anxiety 2019    Other and unspecified hyperlipidemia 10/29/2013    Recurrent major depressive disorder, in remission 2020    Urge incontinence 2017    Uterovaginal prolapse, incomplete 2017    Venous insufficiency of both lower extremities     Vitamin D deficiency 2017     Past Surgical History:   Procedure Laterality Date    APPENDECTOMY  age 18    AUGMENTATION OF BREAST Bilateral 1988    Breast augmentation  1988    COSMETIC SURGERY  2016    facelift    DILATION AND CURETTAGE OF UTERUS      MYOMECTOMY       Social History     Socioeconomic History    Marital status:    Tobacco Use    Smoking status: Never    Smokeless tobacco: Never   Substance and Sexual Activity    Alcohol use: Yes     Alcohol/week: 3.0 standard drinks of alcohol     Types: 2 Shots of liquor, 1 Drinks containing 0.5 oz of alcohol per week     Comment: 1-2 cocktail weekly    Drug use: Not Currently    Sexual activity: Yes     Partners: Male     Birth control/protection: Post-menopausal     Comment:  2015     Social Determinants of Health     Financial Resource Strain: Low Risk  (2023)    Overall Financial Resource Strain (CARDIA)     Difficulty of Paying Living Expenses: Not hard at all   Food Insecurity: No Food Insecurity (2023)    Hunger Vital Sign     Worried About Running Out of Food in the Last Year: Never true     Ran Out of Food in the Last Year: Never true   Transportation Needs: No Transportation Needs (2023)    PRAPARE - Transportation     Lack of Transportation (Medical): No     Lack of Transportation  (Non-Medical): No   Physical Activity: Inactive (2023)    Exercise Vital Sign     Days of Exercise per Week: 0 days     Minutes of Exercise per Session: 0 min   Stress: Stress Concern Present (2023)    [place] [employer] - Occupational Stress Questionnaire     Feeling of Stress : Very much   Housing Stability: Low Risk  (2023)    Housing Stability Vital Sign     Unable to Pay for Housing in the Last Year: No     Number of Places Lived in the Last Year: 1     Unstable Housing in the Last Year: No     Family History   Problem Relation Name Age of Onset    Breast cancer Maternal Aunt      No Known Problems Father      No Known Problems Mother      Diabetes Sister      Schizophrenia Son      Heart disease Sister      No Known Problems Son      Colon cancer Neg Hx      Hypertension Neg Hx      Ovarian cancer Neg Hx      Stroke Neg Hx       OB History    Para Term  AB Living   2 2 2     2   SAB IAB Ectopic Multiple Live Births           2      # Outcome Date GA Lbr Curt/2nd Weight Sex Type Anes PTL Lv   2 Term      Vag-Spont  N BARBY   1 Term      Vag-Spont  N BARBY         Current Outpatient Medications   Medication Sig Dispense Refill    atomoxetine (STRATTERA) 25 MG capsule TAKE 1 CAPSULE(25 MG) BY MOUTH EVERY DAY (Patient not taking: Reported on 3/18/2024) 30 capsule 0    calcium-vitamin D3-vitamin K 500-500-40 mg-unit-mcg Chew Take 1 tablet by mouth once daily.      cartilage/collagen/bor/hyalur (JOINT HEALTH ORAL) Take by mouth.      Chol/Gl/Ser/RNA/Phen/Prg/Hb150 (SHARPER FOCUS ORAL) Take by mouth.      cholecalciferol, vitamin D3, 2,000 unit Cap Take 1,000 Units by mouth once daily.      ciclopirox (PENLAC) 8 % Soln Apply topically nightly. (Patient not taking: Reported on 7/10/2024) 6.6 mL 11    conjugated estrogens (PREMARIN) vaginal cream PLACE 0.5 GRAM VAGINALLY 3 TIMES A WEEK AS DIRECTED 60 g 4    LACTOBACILLUS ACIDOPHILUS (PROBIOTIC ORAL) Take by mouth.       meloxicam (MOBIC) 15 MG tablet TAKE 1 TABLET(15 MG) BY MOUTH DAILY WITH FOOD AS NEEDED FOR PAIN 30 tablet 3    MV-MN/VITC/ASBNA/GLU/KWAME/ ([place], ASCORBATE SODIUM, ORAL) Take by mouth.      NON FORMULARY MEDICATION Daily. Nutrafol      omega 3-dha-epa-fish oil 100-160-1,000 mg Cap       tobramycin sulfate 0.3% (TOBREX) 0.3 % ophthalmic solution 1-2 drops topically twice daily to affected toe(s). 5 mL 3    tretinoin (RETIN-A) 0.05 % cream APPLY PEA SIZED DROP TO FACE NIGHTLY. WASH OFF INNTHE MORNING. SFP ALWAYS      TURMERIC, BULK, MISC Take 1,000 mg by mouth once daily.       No current facility-administered medications for this visit.     Allergies: Acrylates/beheneth-25 methacrylate copolymer, Hydrocortisone, Methylprednisolone aceponate, Prednisone, and Tixocortol pivalate       ROS:  GENERAL: Denies weight gain or weight loss. Feeling well overall.   SKIN: Denies rash or lesions.   HEAD: Denies head injury or headache.   NODES: Denies enlarged lymph nodes.   CHEST: Denies chest pain or shortness of breath.   CARDIOVASCULAR: Denies palpitations or left sided chest pain.   ABDOMEN: No abdominal pain, constipation, diarrhea, nausea, vomiting or rectal bleeding.   URINARY: No frequency, dysuria, hematuria, or burning on urination.  REPRODUCTIVE: Denies vaginal discharge, abnormal vaginal bleeding, lesions, pelvic pain  BREASTS: The patient performs breast self-examination and denies pain, lumps, or nipple discharge.   HEMATOLOGIC: No easy bruisability or excessive bleeding.  MUSCULOSKELETAL: Denies joint pain or swelling.   NEUROLOGIC: Denies syncope or weakness.   PSYCHIATRIC: Denies depression, anxiety or mood swings.      OBJECTIVE:   /80   Wt 66.3 kg (146 lb 2.6 oz)   LMP 09/24/1998 (Approximate)   BMI 23.59 kg/m²   The patient appears well, alert, oriented x 3, in no distress.  PSYCH:  Normal, full range of affect  NECK: negative, no thyromegaly, trachea midline  SKIN: normal, good color, good  turgor and no acne, striae, hirsutism  BREAST EXAM: breasts appear normal, no suspicious masses, no skin or nipple changes or axillary nodes  ABDOMEN: soft, non-tender; bowel sounds normal; no masses,  no organomegaly and no hernias, masses, or hepatosplenomegaly  GENITALIA: normal external genitalia, no erythema, no discharge  BLADDER: soft  URETHRA: normal appearing urethra with no masses, tenderness or lesions and normal urethra, normal urethral meatus  VAGINA: stage 2 rectocele/cystocele and apical prolapse, week pelvic floor muscle  CERVIX: no lesions or cervical motion tenderness  UTERUS: prolapsed second degree  ADNEXA: no mass, fullness, tenderness  THIGH/GROIN:  intetrigo noted - healing      ASSESSMENT:   Don was seen today for well woman.    Diagnoses and all orders for this visit:    Well woman exam with routine gynecological exam    Postmenopausal atrophic vaginitis  -     conjugated estrogens (PREMARIN) vaginal cream; PLACE 0.5 GRAM VAGINALLY 3 TIMES A WEEK AS DIRECTED    Uterovaginal prolapse, incomplete  -     Ambulatory referral/consult to Physical/Occupational Therapy; Future    Colon cancer screening  -     Cologuard Screening (Multitarget Stool DNA); Future  -     Cologuard Screening (Multitarget Stool DNA)        1. Health maintenance  -pap not indicated  -Mmg 2/2024 and NEG  -cologuard ordered  -counseled on exercise and healthy diet, weight loss  -bone health:  Discussed Vitamin D and Calcium supplementation, weight bearing exercises  2.  Discussed safety at home/school/work, healthy and balanced diet, sleep hygiene, as well as violence/weapons exposure.   3.  Vaginal atrophy: refilled estrace  4.  Stage 2 apical prolapse/cystocele/rectocele:  poor kegel effort on exam.  Referral to pelvic floor therapy  5.   Intetrigo:  continue loose fitting clothes, rx for nystatin powder

## 2024-07-29 ENCOUNTER — TELEPHONE (OUTPATIENT)
Dept: OBSTETRICS AND GYNECOLOGY | Facility: CLINIC | Age: 79
End: 2024-07-29
Payer: MEDICARE

## 2024-07-29 NOTE — TELEPHONE ENCOUNTER
----- Message from Simone Zaki sent at 7/29/2024 11:12 AM CDT -----  Regarding: Self [phone]  Type: Patient Call Back    Who called: Self     What is the request in detail: called in regards to the medication, conjugated estrogens (PREMARIN) vaginal cream, stating it was too expensive and would like a call back about this.    Can the clinic reply by [facility]? No     Would the patient rather a call back or a response via [facility]? Call back     Best call back number: [phone]     Additional Information:    Thank you.

## 2024-07-30 ENCOUNTER — TELEPHONE (OUTPATIENT)
Dept: OBSTETRICS AND GYNECOLOGY | Facility: HOSPITAL | Age: 79
End: 2024-07-30
Payer: MEDICARE

## 2024-07-30 DIAGNOSIS — N95.2 POSTMENOPAUSAL ATROPHIC VAGINITIS: ICD-10-CM

## 2024-07-30 RX ORDER — CONJUGATED ESTROGENS 0.62 MG/G
CREAM VAGINAL
Qty: 30 G | Refills: 5 | Status: SHIPPED | OUTPATIENT
Start: 2024-07-30 | End: 2024-07-31 | Stop reason: SDUPTHER

## 2024-07-30 NOTE — TELEPHONE ENCOUNTER
----- Message from Flower Palacios MA sent at 7/29/2024  1:25 PM CDT -----  Regarding: FW: Self [phone]  Hi , I spoke with Genny and she stated that her insurance is not covering for 2 tubes(60g) of the Premarin Cream and that they will only cover 1 tube. Can you resend her prescription for 1tube?  ----- Message -----  From: Simone Haines  Sent: 7/29/2024  11:13 AM CDT  To: [facility] Staff  Subject: Self [phone]                                Type: Patient Call Back    Who called: Self     What is the request in detail: called in regards to the medication, conjugated estrogens (PREMARIN) vaginal cream, stating it was too expensive and would like a call back about this.    Can the clinic reply by [facility]? No     Would the patient rather a call back or a response via [facility]? Call back     Best call back number: [phone]     Additional Information:    Thank you.

## 2024-07-31 ENCOUNTER — TELEPHONE (OUTPATIENT)
Dept: OBSTETRICS AND GYNECOLOGY | Facility: CLINIC | Age: 79
End: 2024-07-31
Payer: MEDICARE

## 2024-07-31 DIAGNOSIS — N95.2 POSTMENOPAUSAL ATROPHIC VAGINITIS: ICD-10-CM

## 2024-07-31 RX ORDER — CONJUGATED ESTROGENS 0.62 MG/G
CREAM VAGINAL
Qty: 30 G | Refills: 5 | Status: SHIPPED | OUTPATIENT
Start: 2024-07-31

## 2024-07-31 NOTE — TELEPHONE ENCOUNTER
Pt advised, per [employer], since the rx was reduced to 30 g instead of 60 g the order must reflect that it's a 30 day supply for the rx to be covered.     ----- Message from Samuel Levine sent at 7/31/2024  2:12 PM CDT -----  Type: General Call Back         Name of Caller:pt  Would the patient rather a call back or a response via [facility]? call  Best Call Back Number:[phone]   Additional Information:Pt states  Dr. García's last conjugated estrogens (PREMARIN) vaginal cream rx was  prescribed quantity of 60, quantity dispensed 30, days of supply 30. Please call pt for further info in needed. Than you.

## 2024-08-05 ENCOUNTER — TELEPHONE (OUTPATIENT)
Dept: OBSTETRICS AND GYNECOLOGY | Facility: CLINIC | Age: 79
End: 2024-08-05
Payer: MEDICARE

## 2024-08-06 ENCOUNTER — TELEPHONE (OUTPATIENT)
Dept: OBSTETRICS AND GYNECOLOGY | Facility: CLINIC | Age: 79
End: 2024-08-06
Payer: MEDICARE

## 2024-08-06 DIAGNOSIS — N95.2 POSTMENOPAUSAL ATROPHIC VAGINITIS: ICD-10-CM

## 2024-08-06 RX ORDER — CONJUGATED ESTROGENS 0.62 MG/G
CREAM VAGINAL
Qty: 30 G | Refills: 5 | Status: SHIPPED | OUTPATIENT
Start: 2024-08-06

## 2024-08-14 DIAGNOSIS — R19.5 POSITIVE COLORECTAL CANCER SCREENING USING COLOGUARD TEST: Primary | ICD-10-CM

## 2024-08-29 ENCOUNTER — PATIENT MESSAGE (OUTPATIENT)
Dept: VASCULAR SURGERY | Facility: CLINIC | Age: 79
End: 2024-08-29
Payer: MEDICARE

## 2024-08-29 ENCOUNTER — TELEPHONE (OUTPATIENT)
Dept: VASCULAR SURGERY | Facility: CLINIC | Age: 79
End: 2024-08-29
Payer: MEDICARE

## 2024-08-29 NOTE — TELEPHONE ENCOUNTER
Spoke with the pt in reference to vein appt scheduled 10/30/24 to get clarity on the reason for the appt.Pt denies having symptomatic varicose veins and is not interested in any surgical procedures.Pt given the option to be seen by vascular medicine providers.Contact information provided and appt cancelled and she verbalized understanding of information received.

## 2024-10-09 ENCOUNTER — TELEPHONE (OUTPATIENT)
Dept: ENDOSCOPY | Facility: HOSPITAL | Age: 79
End: 2024-10-09
Payer: MEDICARE

## 2024-10-09 VITALS — HEIGHT: 66 IN | WEIGHT: 146 LBS | BODY MASS INDEX: 23.46 KG/M2

## 2024-10-09 NOTE — TELEPHONE ENCOUNTER
.Spoke to patient to schedule procedure(s) Colonoscopy       Physician to perform procedure(s) Dr. PRABHA Campbell  Date of Procedure (s) 11/15/24  Arrival Time 9:15 AM  Time of Procedure(s) 10:15 AM   Location of Procedure(s) [address]   Type of Rx Prep sent to patient: Suprep  Instructions provided to patient via [facility]    Patient was informed on the following information and verbalized understanding. Screening questionnaire reviewed with patient and complete. If procedure requires anesthesia, a responsible adult needs to be present to accompany the patient home, patient cannot drive after receiving anesthesia. Appointment details are tentative, especially check-in time. Patient will receive a prep-op call 7 days prior to confirm check-in time for procedure. If applicable the patient should contact their pharmacy to verify Rx for procedure prep is ready for pick-up. Patient was advised to call the scheduling department at [phone] if pharmacy states no Rx is available. Patient was advised to call the endoscopy scheduling department if any questions or concerns arise.      [facility] Scheduling Department

## 2024-10-09 NOTE — TELEPHONE ENCOUNTER
Spoke to pt to reschedule procedure(s) Colonoscopy       Physician to perform procedure(s) Dr. PRABHA Campbell  Date of Procedure (s) 11/13/24  Arrival Time 9:30 AM  Time of Procedure(s) 10:30 AM   Location of Procedure(s) [address]   Type of Rx Prep sent to patient: Suprep  Instructions provided to patient via [facility]    Patient was informed on the following information and verbalized understanding. Screening questionnaire reviewed with patient and complete. If procedure requires anesthesia, a responsible adult needs to be present to accompany the patient home, patient cannot drive after receiving anesthesia. Appointment details are tentative, especially check-in time. Patient will receive a prep-op call 7 days prior to confirm check-in time for procedure. If applicable the patient should contact their pharmacy to verify Rx for procedure prep is ready for pick-up. Patient was advised to call the scheduling department at [phone] if pharmacy states no Rx is available. Patient was advised to call the endoscopy scheduling department if any questions or concerns arise.      [facility] Scheduling Department

## 2024-10-10 DIAGNOSIS — Z12.11 ENCOUNTER FOR SCREENING COLONOSCOPY: Primary | ICD-10-CM

## 2024-10-10 RX ORDER — SODIUM, POTASSIUM,MAG SULFATES 17.5-3.13G
1 SOLUTION, RECONSTITUTED, ORAL ORAL DAILY
Qty: 1 KIT | Refills: 0 | Status: SHIPPED | OUTPATIENT
Start: 2024-10-10 | End: 2024-10-12

## 2024-10-16 ENCOUNTER — CLINICAL SUPPORT (OUTPATIENT)
Dept: REHABILITATION | Facility: HOSPITAL | Age: 79
End: 2024-10-16
Attending: OBSTETRICS & GYNECOLOGY
Payer: MEDICARE

## 2024-10-16 DIAGNOSIS — N81.2 UTEROVAGINAL PROLAPSE, INCOMPLETE: ICD-10-CM

## 2024-10-16 DIAGNOSIS — M62.89 PELVIC FLOOR DYSFUNCTION: Primary | ICD-10-CM

## 2024-10-16 PROCEDURE — 97162 PT EVAL MOD COMPLEX 30 MIN: CPT | Mod: HCNC,PO

## 2024-10-16 PROCEDURE — 97110 THERAPEUTIC EXERCISES: CPT | Mod: HCNC,PO

## 2024-10-16 NOTE — PATIENT INSTRUCTIONS
"HOME EXERCISE PROGRAM: 10/16/2024    ADDUCTION    Sit or lie down with hips and knees bent with feet flat on the floor. Place a rolled up towel, ball, or pillow between your knees and press your knees together so that you squeeze the object firmly. Do 50% of a full contraction.    Hold for 5 seconds. Repeat 10 times. Do 2 set per day.    BRIDGE    While lying on your back, tighten your lower abdominals, squeeze your buttocks and then raise your buttocks off the floor/bed as creating a "Bridge" with your body.        You should NOT feel any pressure or pain in your low back. If you do, tighten your tummy and don't lift quite as high.    Repeat 10 times. Do 2 sets per day.     GLUTE SET SUPINE      Lie on your back and squeeze your buttocks together. Do not hold your breath.     Hold 5 seconds, Repeat 10 times, 2 Sets, once per day    POSTERIOR PELVIC TILT    Lay on your back with both knees bent and feet flat on the table. Gently flatten your back into the table, and hold for 5-10 seconds. Don't hold your breath.    Hold for 5-10 seconds. Repeat 10 times. Do 2 sets per day.          "

## 2024-10-16 NOTE — PROGRESS NOTES
[facility]  Pelvic Health Physical Therapy Initial Evaluation    Date: 10/16/2024   Name: Katiana Hagan  [facility] Number: [id number]  Therapy Diagnosis:   Encounter Diagnosis   Name Primary?    Pelvic floor dysfunction Yes     Physician: Ruth Ann Calle MD    Physician Orders: PT Eval and Treat    Medical Diagnosis from Referral: Uterovaginal prolapse, incomplete [N81.2]   Evaluation Date: 10/16/2024  Authorization Period Expiration: 2025  Plan of Care Expiration: 2025  Visit # / Visits authorized:     Time In: 1:05  Time Out: 1:56  Total Appointment Time (timed & untimed codes): 50 minutes    Precautions: universal    Subjective     Date of onset: over 5 years ago    History of current condition - Katiana reports: urge urinary leakage, and prolapse.  She also notes a sense of vaginal pressure.      OB/GYN History: ;  x 2; uses [facility]  Pain with vaginal exams, intercourse or tampon use? No    Bladder/Bowel History: trouble emptying bladder completely, urinary incontinence, and constipation/straining for movement  Frequency of urination:   Daytime: every 2-4 hours- has difficulty answering           Nighttime: 1-2  Difficulty initiating urine stream: No  Urine stream: strong  Complete emptying: No- often double voids  Bladder leakage: Yes  Frequency of incidents: few times per week  Amount leaked (urine): few drops and moderate amount  Urinary Urgency: Yes, Able to delay the urge for at least 10 minute(s).  Frequency of bowel movements: once a day  Difficulty initiating BM: No  Quality/Shape of BM: [place] Stool Chart 4-5  Does Patient Feel Empty after BM? No  Fiber Supplements or Laxative Use? Yes  Colon leakage: No  Form of protection: pad  Number of pads required in 24 hours: 0-1; sometimes diaper, sometimes pad, sometimes nothing    Pain:  none     Medical History: Katiana  has a past medical history of Anxiety (2019), Other and unspecified hyperlipidemia  (10/29/2013), Recurrent major depressive disorder, in remission (11/30/2020), Urge incontinence (2/9/2017), Uterovaginal prolapse, incomplete (2/9/2017), Venous insufficiency of both lower extremities, and Vitamin D deficiency (9/6/2017).     Surgical History: Katiana Hagan  has a past surgical history that includes Dilation and curettage of uterus; Myomectomy; Appendectomy (age 18); Breast augmentation (1988); Cosmetic surgery (03/08/2016); and Augmentation of breast (Bilateral, 1988).    Medications: Katiana has a current medication list which includes the following prescription(s): atomoxetine, calcium-vitamin d3-vitamin k, cartilage/collagen/bor/hyalur, chol/gl/ser/rna/phen/prg/hb150, cholecalciferol (vitamin d3), ciclopirox, premarin, lactobacillus acidophilus, meloxicam, mv-mn/c/glutamin/lysin/hagr156, NON FORMULARY MEDICATION, nystatin, omega 3-dha-epa-fish oil, tobramycin sulfate 0.3%, tretinoin, and turmeric.    Allergies:   Review of patient's allergies indicates:   Allergen Reactions    Acrylates/beheneth-25 methacrylate copolymer      Fingers get red and inflammed (fake nails)    Hydrocortisone Itching    Methylprednisolone aceponate     Prednisone Nausea And Vomiting and Itching    Tixocortol pivalate Itching        Prior Therapy/Previous treatment included: pelvic health PT with Lauren Shepley  Social History:  lives with their son    Current exercise: none  Occupation: Pt is retired  Prior Level of Function: could control urinary urge in ADL's  Current Level of Function: leaks urine in the presence of urge at times.      Types of fluid intake: water and coffee; gatorade, almond milk  Diet: greek yogurt and Kefir for constipation   Habitus: well developed, well nourished  Abuse/Neglect: No     Pts goals: to stop leaking urine when she has urge    OBJECTIVE     See EMR under MEDIA for written consent provided 10/16/2024  Chaperone: declined    ORTHO SCREEN  Posture in sitting: WNL  Posture in  standing: WNL  Pelvic alignment: left iliac crest elevated      ABDOMINAL WALL ASSESSMENT  Abdominal strength: Rectus abdominus: 3/5     Transverse abdominus: poorly isolated  Scarring: appy scar slightly retracted  Diastasis: absent       BREATHING MECHANICS ASSESSMENT   Thorax Assessment During Quiet Respiration: WNL excursion of abdominal wall  Thorax Assessment During Deep Respiration: Excessive excursion of abdominal wall     VAGINAL PELVIC FLOOR EXAM    EXTERNAL ASSESSMENT  Introitus: gaping  Skin condition: WNL for age  Scarring: none   Sensation: WNL   Pain: none    Voluntary contraction: accessory muscle use  Voluntary relaxation: nil  Involuntary contraction: prolapse  Bearing down:  DNA  Perineal descent: absent        INTERNAL ASSESSMENT  Pain: none   Sensation: able to localized pressure appropriately   Vaginal vault: asymmetrical   Muscle Bulk: atrophy   Muscle Power: 0/5       Quality of contraction: pt inable to produce more than a flicker without accessory muscles; despite multiple verbal cues   Specificity: patient contracts: gluts   Coordination: tends to hold breath during PFM contraction   Prolapse check: Grade 2 cystocele and Grade 3 rectocele    Limitation/Restriction for FOTO PFDI Urinary Survey    Therapist reviewed FOTO scores for Katiana Hagan on 10/16/2024.   FOTO documents entered into EPIC - see Media section.    Limitation Score: 46%       TREATMENT     Treatment Time In: 1:46  Treatment Time Out: 1:56  Total Treatment time (time-based codes) separate from Evaluation: 10 minutes      Therapeutic Exercise to develop strength, endurance, and core stabilization for 10 minutes including:  glute sets, bridging + kegel , and posterior pelvic tilts; adductor sets    Patient Education provided:   general anatomy/physiology of urinary/ bowel  system, benefits of treatment, risks of treatment, and alternative methods of treatment were discussed with the pt. Additionally, anatomy/physiology of  pelvic floor and posture/body mechanices were reviewed.     Home Exercises provided:  Written Home Exercises provided: yes.  Exercises were reviewed and Katiana was able to demonstrate them prior to the end of the session.    Katiana demonstrated good  understanding of the education provided.     See EMR under Patient Instructions for exercises provided 10/16/2024.    Assessment     Katiana is a 78 y.o. female referred to outpatient Physical Therapy with a medical diagnosis of Uterovaginal prolapse, incomplete [N81.2] . Pt presents with poor knowledge of body mechanics and posture, adhered abdominal scar, perineal descent, decreased pelvic muscle strength, decreased phasic ability of the pelvic muscles, poor quality of pelvic muscle contraction, and poor coordination of pelvic floor muscles during ADL's leading to urinary or fecal leakage.       Pt prognosis is Good.   Pt will benefit from skilled outpatient Physical Therapy to address the deficits stated above and in the chart below, provide pt/family education, and to maximize pt's level of independence.     Plan of care discussed with patient: Yes  Pt's spiritual, cultural and educational needs considered and patient is agreeable to the plan of care and goals as stated below:     Anticipated Barriers for therapy: none    Medical Necessity is demonstrated by the following:    History  Co-morbidities and personal factors that may impact the plan of care Co-morbidities   anxiety, depression, and prior abdominal surgery    Personal Factors  no deficits     moderate   Examination  Body structures and functions, activity limitations and participation restrictions that may impact the plan of care Body Regions/Systems/Functions:     poor knowledge of body mechanics and posture, adhered abdominal scar, perineal descent, decreased pelvic muscle strength, decreased phasic ability of the pelvic muscles, poor quality of pelvic muscle contraction, and poor coordination of  pelvic floor muscles during ADL's leading to urinary or fecal leakage    Activity Limitations:  urgency , delaying urge to urinate, and incontinence with ADLs    Participation Restrictions:  all ADLs/iADLs uninterrupted by urinary incontinence/urgency/frequency    Activity limitations:   Learning and applying knowledge  None    General Tasks and Commands  None    Communication  None    Mobility  None    Self care  None    Domestic Life  None    Interactions/Relationships  None    Life Areas  None    Community and Social Life  None       moderate   Clinical Presentation evolving clinical presentation with changing clinical characteristics moderate   Decision Making/ Complexity Score: moderate       Goals:  Short Term Goals: 2 weeks  Pt to report increased awareness of PFM lift/drop during exercises and functional activities.  Pt to be I with double voiding techniques.  Pt to be I with diaphragmatic breathing.      Long Term Goals: 12 weeks   Pt will report improved ability to perform ADLs (ie. dressing, bathing, functional transfers) with little (drops) to no urinary leakage 7/7 days per week.  Pt will report improved ability to perform iADLs (ie. driving, home chores, grocery shopping) with little (drops) to no urinary leakage 7/7 days per week.   Pt will report improved ability to manage bladder spasms appropriately 100% of the time for an improvement in urinary frequency/urgency.   Pt/family will be independent with HEP for continued self-management of symptoms.     Plan     Plan of care Certification: 10/16/2024 to 1/16/2025.    Outpatient Physical Therapy 1 times per 2 week(s)  for 12 weeks to include the following interventions: therapeutic exercises, therapeutic activity, neuromuscular re-education, manual therapy, modalities PRN, patient/family education, and self care/home management    Mariam Quezada, PT, BCB-PMD

## 2024-10-28 DIAGNOSIS — M25.561 RIGHT MEDIAL KNEE PAIN: ICD-10-CM

## 2024-10-28 RX ORDER — MELOXICAM 15 MG/1
TABLET ORAL
Qty: 30 TABLET | Refills: 3 | Status: SHIPPED | OUTPATIENT
Start: 2024-10-28

## 2024-11-07 ENCOUNTER — TELEPHONE (OUTPATIENT)
Dept: ENDOSCOPY | Facility: HOSPITAL | Age: 79
End: 2024-11-07
Payer: MEDICARE

## 2024-11-07 NOTE — TELEPHONE ENCOUNTER
Spoke to pt for pre-call to confirm scheduled Colonoscopy and patient verbalized understanding of the following:       Date of Procedure (s)  verified 11/13/24  Arrival Time 9:30 AM verified.  Location of Procedure(s) [address]  verified.  NPO status reinforced. Ok to continue clear liquids up until 2 hours prior to the Endoscopy procedure.   Patient denies taking any anticoagulants, antiplatelets, GLP-1 medications, or Adipex (Phentermine).   Pt confirmed receipt of prep instructions and Rx prep (if applicable).  Instructions provided to patient via [facility]  Pt confirmed ride home after procedure if procedure requires anesthesia.   Pre-call screening questionnaire reviewed and completed with patient.   Appointment details are tentative, including check-in time.  If the patient begins taking any blood thinning medications, injectable weight loss/diabetes medications (other than insulin), or Adipex (phentermine) patient was instructed to contact the endoscopy scheduling department as soon as possible.  Patient was advised to call the endoscopy scheduling department if any questions or concerns arise.       [facility] Scheduling Department

## 2024-11-11 ENCOUNTER — TELEPHONE (OUTPATIENT)
Dept: ENDOSCOPY | Facility: HOSPITAL | Age: 79
End: 2024-11-11
Payer: MEDICARE

## 2024-11-11 NOTE — TELEPHONE ENCOUNTER
Referral for procedure from Case request      Spoke to patienr to schedule procedure(s) Colonoscopy       Physician to perform procedure(s) Dr. PRABHA Campbell  Date of Procedure (s) 01/10/25  Arrival Time 9:30 AM  Time of Procedure(s) 10:30 AM   Location of Procedure(s) [address]   Type of Rx Prep sent to patient: Suprep  Instructions provided to patient via [facility]    Patient was informed on the following information and verbalized understanding. Screening questionnaire reviewed with patient and complete. If procedure requires anesthesia, a responsible adult needs to be present to accompany the patient home, patient cannot drive after receiving anesthesia. Appointment details are tentative, especially check-in time. Patient will receive a prep-op call 7 days prior to confirm check-in time for procedure. If applicable the patient should contact their pharmacy to verify Rx for procedure prep is ready for pick-up. Patient was advised to call the scheduling department at [phone] if pharmacy states no Rx is available. Patient was advised to call the endoscopy scheduling department if any questions or concerns arise.      [facility] Scheduling Department

## 2025-01-07 ENCOUNTER — TELEPHONE (OUTPATIENT)
Dept: ENDOSCOPY | Facility: HOSPITAL | Age: 80
End: 2025-01-07
Payer: MEDICARE

## 2025-01-07 VITALS — HEIGHT: 66 IN | WEIGHT: 145 LBS | BODY MASS INDEX: 23.3 KG/M2

## 2025-01-07 NOTE — TELEPHONE ENCOUNTER
Referral for procedure from  Workqueue referral (see Appts tab)      Spoke to patient to reschedule procedure(s) Colonoscopy       Physician to perform procedure(s) Dr. KARLEY Arboleda  Date of Procedure (s) 1/13/25  Arrival Time 1:30 PM  Time of Procedure(s) 2:30 PM   Location of Procedure(s) [address]   Type of Rx Prep sent to patient: Suprep  Instructions provided to patient via [facility]    Patient was informed on the following information and verbalized understanding. Screening questionnaire reviewed with patient and complete. If procedure requires anesthesia, a responsible adult needs to be present to accompany the patient home, patient cannot drive after receiving anesthesia. Appointment details are tentative, especially check-in time. Patient will receive a prep-op call 7 days prior to confirm check-in time for procedure. If applicable the patient should contact their pharmacy to verify Rx for procedure prep is ready for pick-up. Patient was advised to call the scheduling department at [phone] if pharmacy states no Rx is available. Patient was advised to call the endoscopy scheduling department if any questions or concerns arise.      [facility] Scheduling Department

## 2025-01-12 ENCOUNTER — NURSE TRIAGE (OUTPATIENT)
Dept: ADMINISTRATIVE | Facility: CLINIC | Age: 80
End: 2025-01-12
Payer: MEDICARE

## 2025-01-12 NOTE — TELEPHONE ENCOUNTER
Pt was calling with no complaints, only a question regarding her colonoscopy prep.   Care advice was information only call.  Patient verbally understands, all questions answered, advised to call back for any symptoms or further needs.     Reason for Disposition   Question about upcoming scheduled surgery, procedure or test, no triage required, and triager able to answer question    Protocols used: Information Only Call - No Triage-A-

## 2025-01-13 ENCOUNTER — ANESTHESIA (OUTPATIENT)
Dept: ENDOSCOPY | Facility: HOSPITAL | Age: 80
End: 2025-01-13
Payer: MEDICARE

## 2025-01-13 ENCOUNTER — ANESTHESIA EVENT (OUTPATIENT)
Dept: ENDOSCOPY | Facility: HOSPITAL | Age: 80
End: 2025-01-13
Payer: MEDICARE

## 2025-01-13 ENCOUNTER — HOSPITAL ENCOUNTER (OUTPATIENT)
Facility: HOSPITAL | Age: 80
Discharge: HOME | End: 2025-01-13
Attending: STUDENT IN AN ORGANIZED HEALTH CARE EDUCATION/TRAINING PROGRAM | Admitting: STUDENT IN AN ORGANIZED HEALTH CARE EDUCATION/TRAINING PROGRAM
Payer: MEDICARE

## 2025-01-13 VITALS
RESPIRATION RATE: 18 BRPM | SYSTOLIC BLOOD PRESSURE: 120 MMHG | OXYGEN SATURATION: 98 % | TEMPERATURE: 98 F | DIASTOLIC BLOOD PRESSURE: 61 MMHG | HEART RATE: 67 BPM

## 2025-01-13 DIAGNOSIS — R19.5 POSITIVE COLORECTAL CANCER SCREENING USING COLOGUARD TEST: ICD-10-CM

## 2025-01-13 PROCEDURE — 25000003 PHARM REV CODE 250: Mod: HCNC | Performed by: STUDENT IN AN ORGANIZED HEALTH CARE EDUCATION/TRAINING PROGRAM

## 2025-01-13 PROCEDURE — D9220A PRA ANESTHESIA: Mod: PT,ANES,, | Performed by: ANESTHESIOLOGY

## 2025-01-13 PROCEDURE — 27201089 HC SNARE, DISP (ANY): Mod: HCNC | Performed by: STUDENT IN AN ORGANIZED HEALTH CARE EDUCATION/TRAINING PROGRAM

## 2025-01-13 PROCEDURE — 63600175 PHARM REV CODE 636 W HCPCS: Mod: HCNC | Performed by: STUDENT IN AN ORGANIZED HEALTH CARE EDUCATION/TRAINING PROGRAM

## 2025-01-13 PROCEDURE — 88305 TISSUE EXAM BY PATHOLOGIST: CPT | Mod: 59,HCNC | Performed by: PATHOLOGY

## 2025-01-13 PROCEDURE — D9220A PRA ANESTHESIA: Mod: PT,CRNA,, | Performed by: STUDENT IN AN ORGANIZED HEALTH CARE EDUCATION/TRAINING PROGRAM

## 2025-01-13 PROCEDURE — 88305 TISSUE EXAM BY PATHOLOGIST: CPT | Mod: 26,HCNC,, | Performed by: PATHOLOGY

## 2025-01-13 PROCEDURE — 37000008 HC ANESTHESIA 1ST 15 MINUTES: Mod: HCNC | Performed by: STUDENT IN AN ORGANIZED HEALTH CARE EDUCATION/TRAINING PROGRAM

## 2025-01-13 PROCEDURE — 45385 COLONOSCOPY W/LESION REMOVAL: CPT | Mod: PT,HCNC,, | Performed by: STUDENT IN AN ORGANIZED HEALTH CARE EDUCATION/TRAINING PROGRAM

## 2025-01-13 PROCEDURE — 45385 COLONOSCOPY W/LESION REMOVAL: CPT | Mod: PT,HCNC | Performed by: STUDENT IN AN ORGANIZED HEALTH CARE EDUCATION/TRAINING PROGRAM

## 2025-01-13 PROCEDURE — 37000009 HC ANESTHESIA EA ADD 15 MINS: Mod: HCNC | Performed by: STUDENT IN AN ORGANIZED HEALTH CARE EDUCATION/TRAINING PROGRAM

## 2025-01-13 RX ORDER — PROPOFOL 10 MG/ML
VIAL (ML) INTRAVENOUS
Status: DISCONTINUED | OUTPATIENT
Start: 2025-01-13 | End: 2025-01-13

## 2025-01-13 RX ORDER — LIDOCAINE HYDROCHLORIDE 20 MG/ML
INJECTION, SOLUTION EPIDURAL; INFILTRATION; INTRACAUDAL; PERINEURAL
Status: DISCONTINUED
Start: 2025-01-13 | End: 2025-01-13 | Stop reason: HOSPADM

## 2025-01-13 RX ORDER — LIDOCAINE HYDROCHLORIDE 20 MG/ML
INJECTION INTRAVENOUS
Status: DISCONTINUED | OUTPATIENT
Start: 2025-01-13 | End: 2025-01-13

## 2025-01-13 RX ORDER — PROPOFOL 10 MG/ML
VIAL (ML) INTRAVENOUS
Status: DISCONTINUED
Start: 2025-01-13 | End: 2025-01-13 | Stop reason: HOSPADM

## 2025-01-13 RX ADMIN — PROPOFOL 20 MG: 10 INJECTION, EMULSION INTRAVENOUS at 02:01

## 2025-01-13 RX ADMIN — PROPOFOL 30 MG: 10 INJECTION, EMULSION INTRAVENOUS at 02:01

## 2025-01-13 RX ADMIN — LIDOCAINE HYDROCHLORIDE 100 MG: 20 INJECTION, SOLUTION INTRAVENOUS at 02:01

## 2025-01-13 RX ADMIN — PROPOFOL 20 MG: 10 INJECTION, EMULSION INTRAVENOUS at 03:01

## 2025-01-13 RX ADMIN — SODIUM CHLORIDE: 0.9 INJECTION, SOLUTION INTRAVENOUS at 02:01

## 2025-01-13 RX ADMIN — PROPOFOL 60 MG: 10 INJECTION, EMULSION INTRAVENOUS at 02:01

## 2025-01-13 NOTE — PROVATION PATIENT INSTRUCTIONS
Discharge Summary/Instructions after an Endoscopic Procedure  Patient Name: Katiana Hagan  Patient MRN: [id number]  Patient YOB: 1945 Monday, January 13, 2025  Rajat Arboleda MD  Dear patient,  As a result of recent federal legislation (The Federal Cures Act), you may   receive lab or pathology results from your procedure in your [facility]   account before your physician is able to contact you. Your physician or   their representative will relay the results to you with their   recommendations at their soonest availability.  Thank you,  RESTRICTIONS:  During your procedure today, you received medications for sedation.  These   medications may affect your judgment, balance and coordination.  Therefore,   for 24 hours, you have the following restrictions:   - DO NOT drive a car, operate machinery, make legal/financial decisions,   sign important papers or drink alcohol.    ACTIVITY:  Today: no heavy lifting, straining or running due to procedural   sedation/anesthesia.  The following day: return to full activity including work.  DIET:  Eat and drink normally unless instructed otherwise.     TREATMENT FOR COMMON SIDE EFFECTS:  - Mild abdominal pain, nausea, belching, bloating or excessive gas:  rest,   eat lightly and use a heating pad.  - Sore Throat: treat with throat lozenges and/or gargle with warm salt   water.  - Because air was used during the procedure, expelling large amounts of air   from your rectum or belching is normal.  - If a bowel prep was taken, you may not have a bowel movement for 1-3 days.    This is normal.  SYMPTOMS TO WATCH FOR AND REPORT TO YOUR PHYSICIAN:  1. Abdominal pain or bloating, other than gas cramps.  2. Chest pain.  3. Back pain.  4. Signs of infection such as: chills or fever occurring within 24 hours   after the procedure.  5. Rectal bleeding, which would show as bright red, maroon, or black stools.   (A tablespoon of blood from the rectum is not serious, especially if    hemorrhoids are present.)  6. Vomiting.  7. Weakness or dizziness.  GO DIRECTLY TO THE NEAREST EMERGENCY ROOM IF YOU HAVE ANY OF THE FOLLOWING:      Difficulty breathing              Chills and/or fever over 101 F   Persistent vomiting and/or vomiting blood   Severe abdominal pain   Severe chest pain   Black, tarry stools   Bleeding- more than one tablespoon   Any other symptom or condition that you feel may need urgent attention  Your doctor recommends these additional instructions:  If any biopsies were taken, your doctors clinic will contact you in 1 to 2   weeks with any results.  - Discharge patient to home (ambulatory).   - Patient has a contact number available for emergencies.  The signs and   symptoms of potential delayed complications were discussed with the   patient.  Return to normal activities tomorrow.  Written discharge   instructions were provided to the patient.   - Resume previous diet.   - Continue present medications.   - Return to primary care physician as previously scheduled.   - Repeat colonoscopy in 3 years for surveillance. Final recommendation   pending review of pathology. Cologuard should not be repeated again in the   future.  For questions, problems or results please call your physician - Rajat Arboleda MD at Work:  [phone].  [facility] can be reached at [phone]     IF A COMPLICATION OR EMERGENCY SITUATION ARISES AND YOU ARE UNABLE TO REACH   YOUR PHYSICIAN - GO DIRECTLY TO THE EMERGENCY ROOM.  MD Rajat Zhu MD  1/13/2025 3:18:06 PM  This report has been verified and signed electronically.  Dear patient,  As a result of recent federal legislation (The Federal Cures Act), you may   receive lab or pathology results from your procedure in your [facility]   account before your physician is able to contact you. Your physician or   their representative will relay the results to you with their   recommendations at their soonest  availability.  Thank you,  PROVATION

## 2025-01-13 NOTE — ANESTHESIA PREPROCEDURE EVALUATION
01/13/2025    Pre-operative evaluation for Procedure(s) (LRB):  COLONOSCOPY (N/A)    Katiana Hagan is a 79 y.o. female     Patient Active Problem List   Diagnosis    Hyperlipidemia    Venous insufficiency of both lower extremities    Urge incontinence    Nocturia    Uterovaginal prolapse, incomplete    Vitamin D deficiency    Anxiety    BMI 24.0-24.9, adult    Recurrent major depressive disorder, in remission    Primary osteoarthritis of right knee    Age-related osteoporosis without current pathological fracture    Pelvic floor dysfunction       Review of patient's allergies indicates:   Allergen Reactions    Acrylates/beheneth-25 methacrylate copolymer      Fingers get red and inflammed (fake nails)    Hydrocortisone Itching    Methylprednisolone aceponate     Prednisone Nausea And Vomiting and Itching    Tixocortol pivalate Itching       No current facility-administered medications on file prior to encounter.     Current Outpatient Medications on File Prior to Encounter   Medication Sig Dispense Refill    atomoxetine (STRATTERA) 25 MG capsule TAKE 1 CAPSULE(25 MG) BY MOUTH EVERY DAY (Patient not taking: Reported on 3/18/2024) 30 capsule 0    calcium-vitamin D3-vitamin K 500-500-40 mg-unit-mcg Chew Take 1 tablet by mouth once daily.      cartilage/collagen/bor/hyalur (JOINT HEALTH ORAL) Take by mouth.      Chol/Gl/Ser/RNA/Phen/Prg/Hb150 (SHARPER FOCUS ORAL) Take by mouth.      cholecalciferol, vitamin D3, 2,000 unit Cap Take 1,000 Units by mouth once daily.      ciclopirox (PENLAC) 8 % Soln Apply topically nightly. (Patient not taking: Reported on 7/10/2024) 6.6 mL 11    conjugated estrogens (PREMARIN) vaginal cream PLACE 0.5 GRAM VAGINALLY 3 TIMES A WEEK AS DIRECTED 30 g 5    LACTOBACILLUS ACIDOPHILUS (PROBIOTIC ORAL) Take by mouth.      MV-MN/VITC/ASBNA/GLU/KWAME/ ([place], ASCORBATE SODIUM, ORAL)  Take by mouth.      NON FORMULARY MEDICATION Daily. Nutrafol      nystatin (MYCOSTATIN) powder Apply to affected area 2 times daily PRN 60 g 3    omega 3-dha-epa-fish oil 100-160-1,000 mg Cap       tobramycin sulfate 0.3% (TOBREX) 0.3 % ophthalmic solution 1-2 drops topically twice daily to affected toe(s). 5 mL 3    tretinoin (RETIN-A) 0.05 % cream APPLY PEA SIZED DROP TO FACE NIGHTLY. WASH OFF INNTHE MORNING. SFP ALWAYS      TURMERIC, BULK, MISC Take 1,000 mg by mouth once daily.         Past Surgical History:   Procedure Laterality Date    APPENDECTOMY  age 18    AUGMENTATION OF BREAST Bilateral 1988    Breast augmentation  1988    COSMETIC SURGERY  03/08/2016    facelift    DILATION AND CURETTAGE OF UTERUS      MYOMECTOMY         Social History     Socioeconomic History    Marital status:    Tobacco Use    Smoking status: Never    Smokeless tobacco: Never   Substance and Sexual Activity    Alcohol use: Yes     Alcohol/week: 3.0 standard drinks of alcohol     Types: 2 Shots of liquor, 1 Drinks containing 0.5 oz of alcohol per week     Comment: 1-2 cocktail weekly    Drug use: Not Currently    Sexual activity: Yes     Partners: Male     Birth control/protection: Post-menopausal     Comment:  2015     Social Drivers of Health     Financial Resource Strain: Low Risk  (12/5/2023)    Overall Financial Resource Strain (CARDIA)     Difficulty of Paying Living Expenses: Not hard at all   Food Insecurity: No Food Insecurity (12/5/2023)    Hunger Vital Sign     Worried About Running Out of Food in the Last Year: Never true     Ran Out of Food in the Last Year: Never true   Transportation Needs: No Transportation Needs (12/5/2023)    PRAPARE - Transportation     Lack of Transportation (Medical): No     Lack of Transportation (Non-Medical): No   Physical Activity: Inactive (12/5/2023)    Exercise Vital Sign     Days of Exercise per Week: 0 days     Minutes of Exercise per Session: 0 min   Stress: Stress Concern  Present (12/5/2023)    [facility] [facility] - Occupational Stress Questionnaire     Feeling of Stress : Very much   Housing Stability: Low Risk  (12/5/2023)    Housing Stability Vital Sign     Unable to Pay for Housing in the Last Year: No     Number of Places Lived in the Last Year: 1     Unstable Housing in the Last Year: No               Pre-op Assessment    I have reviewed the Patient Summary Reports.    I have reviewed the NPO Status.      Review of Systems  Anesthesia Hx:  No problems with previous Anesthesia             Denies Family Hx of Anesthesia complications.    Denies Personal Hx of Anesthesia complications.                    Social:  Non-Smoker       Hematology/Oncology:    Oncology Normal                                   EENT/Dental:  EENT/Dental Normal           Cardiovascular:                hyperlipidemia                               Pulmonary:  Pulmonary Normal                       Renal/:  Renal/ Normal                 Hepatic/GI:  Bowel Prep.                   Musculoskeletal:  Arthritis        Arthritis          Neurological:    Neuromuscular Disease,           Arthritis                         Neuromuscular Disease   Endocrine:  Endocrine Normal            Psych:  Psychiatric History                  Physical Exam  General: Well nourished, Cooperative, Alert and Oriented    Airway:  Mouth Opening: Normal  TM Distance: Normal  Tongue: Normal  Neck ROM: Normal ROM    Dental:  Intact    Chest/Lungs:  Normal Respiratory Rate    Heart:  Rate: Normal        Anesthesia Plan  Type of Anesthesia, risks & benefits discussed:    Anesthesia Type: Gen Natural Airway  Intra-op Monitoring Plan: Standard ASA Monitors  Induction:  IV  Informed Consent: Informed consent signed with the Patient and all parties understand the risks and agree with anesthesia plan.  All questions answered.   ASA Score: 2    Ready For Surgery From Anesthesia Perspective.     .

## 2025-01-13 NOTE — OR NURSING
PROCEDURE AND RECOVERY COMPLETED. DR. CORONADO  DISCUSSED FINDINGS WITH PATIENT; DISCHARGE INSTRUCTIONS GIVEN AND UNDERSTANDING VERBALIZED; ASSISTED UP WITHOUT UNTOWARD EFFECTS; PATIENT READY DISCHARGE.

## 2025-01-13 NOTE — TRANSFER OF CARE
Anesthesia Transfer of Care Note    Patient: Katiana Hagan    Procedure(s) Performed: Procedure(s) (LRB):  COLONOSCOPY (N/A)    Patient location: GI    Anesthesia Type: general    Transport from OR: Transported from OR on room air with adequate spontaneous ventilation    Post pain: adequate analgesia    Post assessment: no apparent anesthetic complications and tolerated procedure well    Post vital signs: stable    Level of consciousness: lethargic and responds to stimulation    Nausea/Vomiting: no nausea/vomiting    Complications: none    Transfer of care protocol was followed      Last vitals: Visit Vitals  /66   Pulse 70   Temp 36.5 °C (97.7 °F) (Oral)   Resp 18   LMP 09/24/1998 (Approximate)   SpO2 99%   Breastfeeding No

## 2025-01-13 NOTE — H&P
Short Stay Endoscopy History and Physical    PCP - Tucker Ren MD    Procedure - Colonoscopy  ASA - per anesthesia  Mallampati - per anesthesia  History of Anesthesia problems - no  Family history Anesthesia problems -  no   Plan of anesthesia - General    HPI:  This is a 79 y.o. female here for evaluation of : positive cologuard      Medical History:  has a past medical history of Anxiety (8/13/2019), Other and unspecified hyperlipidemia (10/29/2013), Recurrent major depressive disorder, in remission (11/30/2020), Urge incontinence (2/9/2017), Uterovaginal prolapse, incomplete (2/9/2017), Venous insufficiency of both lower extremities, and Vitamin D deficiency (9/6/2017).    Surgical History:  has a past surgical history that includes Dilation and curettage of uterus; Myomectomy; Appendectomy (age 18); Breast augmentation (1988); Cosmetic surgery (03/08/2016); and Augmentation of breast (Bilateral, 1988).    Family History: family history includes Breast cancer in her maternal aunt; Diabetes in her sister; Heart disease in her sister; No Known Problems in her father, mother, and son; Schizophrenia in her son.. Otherwise no colon cancer, inflammatory bowel disease, or GI malignancies.    Social History:  reports that she has never smoked. She has never used smokeless tobacco. She reports current alcohol use of about 3.0 standard drinks of alcohol per week. She reports that she does not currently use drugs.    Review of patient's allergies indicates:   Allergen Reactions    Acrylates/beheneth-25 methacrylate copolymer      Fingers get red and inflammed (fake nails)    Hydrocortisone Itching    Methylprednisolone aceponate     Prednisone Nausea And Vomiting and Itching    Tixocortol pivalate Itching       Medications:   Medications Prior to Admission   Medication Sig Dispense Refill Last Dose/Taking    atomoxetine (STRATTERA) 25 MG capsule TAKE 1 CAPSULE(25 MG) BY MOUTH EVERY DAY (Patient not taking:  Reported on 3/18/2024) 30 capsule 0     calcium-vitamin D3-vitamin K 500-500-40 mg-unit-mcg Chew Take 1 tablet by mouth once daily.       cartilage/collagen/bor/hyalur (JOINT HEALTH ORAL) Take by mouth.       Chol/Gl/Ser/RNA/Phen/Prg/Hb150 (SHARPER FOCUS ORAL) Take by mouth.       cholecalciferol, vitamin D3, 2,000 unit Cap Take 1,000 Units by mouth once daily.       ciclopirox (PENLAC) 8 % Soln Apply topically nightly. (Patient not taking: Reported on 7/10/2024) 6.6 mL 11     conjugated estrogens (PREMARIN) vaginal cream PLACE 0.5 GRAM VAGINALLY 3 TIMES A WEEK AS DIRECTED 30 g 5     LACTOBACILLUS ACIDOPHILUS (PROBIOTIC ORAL) Take by mouth.       meloxicam (MOBIC) 15 MG tablet TAKE 1 TABLET(15 MG) BY MOUTH DAILY WITH FOOD AS NEEDED FOR PAIN 30 tablet 3     MV-MN/VITC/ASBNA/GLU/KWAME/ (AIRBORNE, ASCORBATE SODIUM, ORAL) Take by mouth.       NON FORMULARY MEDICATION Daily. Nutrafol       nystatin (MYCOSTATIN) powder Apply to affected area 2 times daily PRN 60 g 3     omega 3-dha-epa-fish oil 100-160-1,000 mg Cap        tobramycin sulfate 0.3% (TOBREX) 0.3 % ophthalmic solution 1-2 drops topically twice daily to affected toe(s). 5 mL 3     tretinoin (RETIN-A) 0.05 % cream APPLY PEA SIZED DROP TO FACE NIGHTLY. WASH OFF INNTHE MORNING. SFP ALWAYS       TURMERIC, BULK, MISC Take 1,000 mg by mouth once daily.            Physical Exam:    Vital Signs: There were no vitals filed for this visit.    General Appearance: Well appearing in no acute distress  Head: Normocephalic, without obvious abnormality   Lungs: Non-labored breathing  Abdomen: Soft, non tender, non distended     Labs:  Lab Results   Component Value Date    WBC 5.13 07/03/2024    HGB 15.6 07/03/2024    HCT 47.0 07/03/2024     07/03/2024    CHOL 202 (H) 07/03/2024    TRIG 114 07/03/2024    HDL 56 07/03/2024    ALT 16 07/03/2024    AST 16 07/03/2024     07/03/2024    K 4.5 07/03/2024     07/03/2024    CREATININE 1.0 07/03/2024    BUN 19  07/03/2024    CO2 25 07/03/2024    TSH 0.763 07/03/2024    INR 0.9 12/15/2008       I have explained the risks and benefits of endoscopy procedures to the patient including but not limited to bleeding, perforation, infection, and death.  The patient was asked if they understand and allowed to ask any further questions to their satisfaction.    Rajat Arboleda MD

## 2025-01-14 NOTE — ANESTHESIA POSTPROCEDURE EVALUATION
Anesthesia Post Evaluation    Patient: Katiana Hagan    Procedure(s) Performed: Procedure(s) (LRB):  COLONOSCOPY (N/A)    Final Anesthesia Type: general      Patient location during evaluation: GI PACU  Patient participation: Yes- Able to Participate  Level of consciousness: awake and alert and oriented  Post-procedure vital signs: reviewed and stable  Pain management: adequate  Airway patency: patent    PONV status at discharge: No PONV  Anesthetic complications: no      Cardiovascular status: blood pressure returned to baseline and hemodynamically stable  Respiratory status: unassisted, spontaneous ventilation and room air  Hydration status: euvolemic  Follow-up not needed.              Vitals Value Taken Time   /61 01/13/25 1541   Temp 36.5 °C (97.7 °F) 01/13/25 1511   Pulse 67 01/13/25 1541   Resp 18 01/13/25 1541   SpO2 98 % 01/13/25 1541         Event Time   Out of Recovery 15:46:00         Pain/Seferino Score: Seferino Score: 10 (1/13/2025  3:41 PM)

## 2025-01-15 LAB
FINAL PATHOLOGIC DIAGNOSIS: NORMAL
GROSS: NORMAL
Lab: NORMAL

## 2025-01-16 NOTE — PROGRESS NOTES
Dear Ms. Haagn:    It was my pleasure seeing you at the time of your recent colonoscopy at [facility].    I am happy to report the polyp(s) we removed were benign. 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    Based on these findings and the findings at the time of your procedure, I recommend you follow-up with a repeat colonoscopy in approximately 3 Years    I will forward a copy of this letter to your primary care/referring physician. Please do not hesitate to contact me if you have any questions regarding this letter.    Sincerely,    Raajt Arboleda MD

## 2025-01-20 ENCOUNTER — NURSE TRIAGE (OUTPATIENT)
Dept: ADMINISTRATIVE | Facility: CLINIC | Age: 80
End: 2025-01-20
Payer: MEDICARE

## 2025-01-20 ENCOUNTER — OCHSNER VIRTUAL EMERGENCY DEPARTMENT (OUTPATIENT)
Facility: CLINIC | Age: 80
End: 2025-01-20
Payer: MEDICARE

## 2025-01-20 DIAGNOSIS — N76.0 ACUTE VAGINITIS: Primary | ICD-10-CM

## 2025-01-20 RX ORDER — FLUCONAZOLE 200 MG/1
200 TABLET ORAL
Qty: 2 TABLET | Refills: 0 | Status: SHIPPED | OUTPATIENT
Start: 2025-01-20 | End: 2025-01-28

## 2025-01-20 NOTE — PLAN OF CARE-OVED
[facility] Emergency Department Plan of Care Note  Referral Source: Nurse On-Call                               Chief Complaint   Patient presents with    Vaginitis     Has had before, monistat not helping       Recommendation: Treat in place                          Encounter Diagnosis   Name Primary?    Acute vaginitis Yes        Medications Ordered This Encounter   Medications    fluconazole (DIFLUCAN) 200 MG Tab     Sig: Take 1 tablet (200 mg total) by mouth every 7 days. for 2 doses     Dispense:  2 tablet     Refill:  0

## 2025-01-20 NOTE — TELEPHONE ENCOUNTER
"Pt called in stating that she has symptoms of yeast infection- wants medication called in because Monistat makes her feel worse. Tried monistat in the past and had adverse reaction. Tried again few days ago and had to remove it shortly after due to burning and worsening of symptoms.  Requesting diflucan- has taken in the past for same. States symptoms began last week and are not improving. Itching, redness, and irritation.     [facility]- pt is not at home- this location is closer to where she has been staying.     Attempt to schedule appt per dispo not successful. Consulted with Al[facility] per protocol. Dr. Leblanc requests e-visit. Unable to enter order due to error. Dr. Leblanc to submit RX directly for pt. Pt has no additional concerns or questions at this time. Advised to call back with concerns or worsening symptoms. Verbalized understanding.       Reason for Disposition   [1] Symptoms of a "yeast infection" (i.e., itchy, white discharge, not bad smelling) AND [2] not improved > 3 days following Care Advice    Additional Information   Negative: Patient sounds very sick or weak to the triager   Negative: [1] SEVERE pain AND [2] not improved 2 hours after pain medicine   Negative: [1] Genital area looks infected (e.g., draining sore, spreading redness) AND [2] fever   Negative: MODERATE-SEVERE itching (i.e., interferes with school, work, or sleep)   Negative: Genital area looks infected (e.g., draining sore, spreading redness)   Negative: Rash with painful tiny water blisters   Negative: [1] Rash (e.g., redness, tiny bumps, sore) of genital area AND [2] present > 24 hours   Negative: Tender lump (swelling or "ball") at vaginal opening    Protocols used: Vulvar Symptoms-A-AH    "

## 2025-03-10 ENCOUNTER — NURSE TRIAGE (OUTPATIENT)
Dept: ADMINISTRATIVE | Facility: CLINIC | Age: 80
End: 2025-03-10
Payer: MEDICARE

## 2025-03-10 NOTE — TELEPHONE ENCOUNTER
"Reason for Disposition   MILD eyelid swelling (puffiness) and sinus pain or pressure    Additional Information   Negative: Unresponsive, passed out or very weak   Negative: Difficulty breathing or wheezing   Negative: Difficulty swallowing or slurred speech and sudden onset   Negative: Sounds like a life-threatening emergency to the triager   Negative: SEVERE eyelid swelling (e.g., eyelids swollen shut or almost shut) and fever   Negative: Eyelid (outer) is very red and fever   Negative: Pregnant 20 or more weeks and sudden weight gain (e.g., more than 3 lbs or 1.4 kg in one week)   Negative: Postpartum (from 0 to 6 weeks after delivery) and sudden weight gain (e.g., more than 3 lbs or 1.4 kg in one week)   Negative: Patient sounds very sick or weak to the triager   Negative: SEVERE eyelid swelling (e.g., eyelids swollen shut or almost shut) and involves both eyes   Negative: SEVERE eyelid swelling and taking an ACE Inhibitor medicine (e.g., benazepril/LOTENSIN, captopril/CAPOTEN, enalapril/VASOTEC, lisinopril/ZESTRIL)   Negative: SEVERE eyelid swelling on one side that is red and painful (or tender to touch)   Negative: SEVERE eyelid swelling on one side and sinus pain or pressure   Negative: Fever   Negative: Painful rash and multiple small blisters grouped together (i.e., dermatomal distribution or "band" or "stripe")   Negative: MODERATE to SEVERE eyelid swelling on one side  (Exception: Due to a mosquito bite.)   Negative: Eyelid is red and painful (or tender to touch)   Negative: Swelling of both lower legs (i.e., bilateral pedal edema)    Protocols used: Eyelid Swelling-A-OH  Pt states she went to Urgent Care and was prescribed poly mycin B drops. State she used the drops for 7 and she still has swelling and mild redness in her eyes. States she also has mild sinus pain. Advised to see a Healthcare Provider. Appointment set for 1100 tomorrow with Shannan Mehta. Instructed to call [facility] back if symptoms worsen. Pt " verbalized understanding.

## 2025-03-11 ENCOUNTER — TELEPHONE (OUTPATIENT)
Dept: FAMILY MEDICINE | Facility: CLINIC | Age: 80
End: 2025-03-11
Payer: MEDICARE

## 2025-03-11 ENCOUNTER — OFFICE VISIT (OUTPATIENT)
Dept: FAMILY MEDICINE | Facility: CLINIC | Age: 80
End: 2025-03-11
Payer: MEDICARE

## 2025-03-11 VITALS
HEIGHT: 66 IN | OXYGEN SATURATION: 96 % | SYSTOLIC BLOOD PRESSURE: 134 MMHG | WEIGHT: 152.56 LBS | HEART RATE: 82 BPM | BODY MASS INDEX: 24.52 KG/M2 | RESPIRATION RATE: 18 BRPM | TEMPERATURE: 97 F | DIASTOLIC BLOOD PRESSURE: 74 MMHG

## 2025-03-11 DIAGNOSIS — M85.80 OSTEOPENIA AFTER MENOPAUSE: ICD-10-CM

## 2025-03-11 DIAGNOSIS — Z78.0 OSTEOPENIA AFTER MENOPAUSE: ICD-10-CM

## 2025-03-11 DIAGNOSIS — B30.9 VIRAL CONJUNCTIVITIS OF BOTH EYES: Primary | ICD-10-CM

## 2025-03-11 PROCEDURE — G2211 COMPLEX E/M VISIT ADD ON: HCPCS | Mod: HCNC,S$GLB,,

## 2025-03-11 PROCEDURE — 99214 OFFICE O/P EST MOD 30 MIN: CPT | Mod: HCNC,S$GLB,,

## 2025-03-11 PROCEDURE — 3078F DIAST BP <80 MM HG: CPT | Mod: HCNC,CPTII,S$GLB,

## 2025-03-11 PROCEDURE — 99999 PR PBB SHADOW E&M-EST. PATIENT-LVL V: CPT | Mod: PBBFAC,HCNC,,

## 2025-03-11 PROCEDURE — 1159F MED LIST DOCD IN RCRD: CPT | Mod: HCNC,CPTII,S$GLB,

## 2025-03-11 PROCEDURE — 1126F AMNT PAIN NOTED NONE PRSNT: CPT | Mod: HCNC,CPTII,S$GLB,

## 2025-03-11 PROCEDURE — 3075F SYST BP GE 130 - 139MM HG: CPT | Mod: HCNC,CPTII,S$GLB,

## 2025-03-11 RX ORDER — OLOPATADINE HYDROCHLORIDE 1 MG/ML
1 SOLUTION/ DROPS OPHTHALMIC 2 TIMES DAILY
Qty: 5 ML | Refills: 0 | Status: SHIPPED | OUTPATIENT
Start: 2025-03-11 | End: 2026-03-11

## 2025-03-11 RX ORDER — OLOPATADINE HYDROCHLORIDE 1 MG/ML
1 SOLUTION/ DROPS OPHTHALMIC 2 TIMES DAILY
Qty: 5 ML | Refills: 0 | Status: SHIPPED | OUTPATIENT
Start: 2025-03-11 | End: 2025-03-11

## 2025-03-11 RX ORDER — NEOMYCIN SULFATE, POLYMYXIN B SULFATE, AND DEXAMETHASONE 3.5; 10000; 1 MG/G; [USP'U]/G; MG/G
OINTMENT OPHTHALMIC EVERY 6 HOURS
COMMUNITY
End: 2025-03-11 | Stop reason: ALTCHOICE

## 2025-03-11 NOTE — TELEPHONE ENCOUNTER
----- Message from Hamida sent at 3/11/2025  1:20 PM CDT -----  Regarding: Self  Type: Patient Call Back What is the request in detail:(Max) Pt states the eye drops she was prescribed is now available OTC. Can the clinic reply by [facility]? No Would the patient rather a call back or a response via [facility]? Call [facility] call back number: .[phone] Information:Thank you.

## 2025-03-11 NOTE — TELEPHONE ENCOUNTER
Pt just wanted to let you know she spoke to pharmacist at [facility] and was told she can purchase OTC (pataday)

## 2025-03-11 NOTE — PROGRESS NOTES
Health Maintenance Due   Topic Date Due    RSV Vaccine (Age 60+ and Pregnant patients) (1 - 1-dose 75+ series) Not offered at this office    COVID-19 Vaccine (4 - 2024-25 season) 09/01/2024    DEXA Scan  02/27/2025

## 2025-03-15 NOTE — PROGRESS NOTES
Family Medicine      Patient Name: Katiana Hagan  MRN: [id number]  : 1945    PCP: Tucker Ren MD       HPI      Katiana Hagan is a 79 y.o. female with multiple medical diagnoses as listed in the medical history and problem list that presents for   Chief Complaint   Patient presents with    Conjunctivitis       HPI    History of Present Illness    Patient presents today for evaluation of pink eye She reports recurrence of pink eye symptoms. Initially, her eyes were pink/red without significant watering. On , she experienced increased eye watering. Yesterday morning, she woke up with swollen eyelids and off-colored ocular discharge, with dried discharge noted upon waking. She reports chronic eye redness, even without makeup use. She denies any changes in vision. She reports allergic reactions to various mascara brands, resulting in increased eye redness and periorbital puffiness. She has been using antibiotic eye drops since , initially prescribed every 3 hours for 7 days, then modified to twice daily 4 times daily after discussing with prescriber. She uses Lumify eye drops almost daily for chronic eye redness management, reporting effective symptom reduction. She has a history of osteopenia and recurrent toenail problems with episodes of toenail detachment, most recently in 2024.      ROS:  General: -fever, -chills, -fatigue, -weight gain, -weight loss  Eyes: -vision changes, +redness, +discharge, -eye pain  ENT: -ear pain, -nasal congestion, -sore throat  Cardiovascular: -chest pain, -palpitations, -lower extremity edema  Respiratory: -cough, -shortness of breath  Gastrointestinal: -abdominal pain, -nausea, -vomiting, -diarrhea, -constipation, -blood in stool  Genitourinary: -dysuria, -hematuria, -frequency  Musculoskeletal: -joint pain, -muscle pain  Skin: -rash, -lesion  Neurological: -headache, -dizziness, -numbness, -tingling  Psychiatric: -anxiety,  -depression, -sleep difficulty              History      Past Medical History:  Past Medical History:   Diagnosis Date    Anxiety 8/13/2019    Other and unspecified hyperlipidemia 10/29/2013    Recurrent major depressive disorder, in remission 11/30/2020    Urge incontinence 2/9/2017    Uterovaginal prolapse, incomplete 2/9/2017    Venous insufficiency of both lower extremities     Vitamin D deficiency 9/6/2017       Past Surgical History:  Past Surgical History:   Procedure Laterality Date    APPENDECTOMY  age 18    AUGMENTATION OF BREAST Bilateral 1988    Breast augmentation  1988    COLONOSCOPY N/A 1/13/2025    Procedure: COLONOSCOPY;  Surgeon: Rajat Arboleda MD;  Location: [facility] ENDO;  Service: Endoscopy;  Laterality: N/A;  Ref Dr Alva Chapman-Harpreet Torres-Suprep-Portal-ASul  10/9 pt r/s suprep to portal.cf  11/7/24-Precall complete-DS  11/11 pt rs suprep (pt already has) portal AE/ML  1/3/25- pc complete. DBM  1/7-pt r/s, updated instructions sent to portal-[facility]    COSMETIC SURGERY  03/08/2016    facelift    DILATION AND CURETTAGE OF UTERUS      MYOMECTOMY         Social History:  Social History[1]    Family History:  Family History   Problem Relation Name Age of Onset    Breast cancer Maternal Aunt      No Known Problems Father      No Known Problems Mother      Diabetes Sister      Schizophrenia Son      Heart disease Sister      No Known Problems Son      Colon cancer Neg Hx      Hypertension Neg Hx      Ovarian cancer Neg Hx      Stroke Neg Hx         Allergies and Medications: (updated and reviewed)  Review of patient's allergies indicates:   Allergen Reactions    Acrylates/beheneth-25 methacrylate copolymer      Fingers get red and inflammed (fake nails)    Hydrocortisone Itching    Methylprednisolone aceponate     Prednisone Nausea And Vomiting and Itching    Tixocortol pivalate Itching     Current Medications[2]    Patient Care Team:  Tucker Ren MD as PCP - General (Internal Medicine)  Kody Garcia,  MD as Consulting Physician (Neurology)  Araceli Quijano MD as Obstetrician (Obstetrics)  Malathi Damian MD (Inactive) as Consulting Physician (Pain Medicine)  Maximilian Dyson, PhD, LCSW (Inactive) as Consulting Physician (Psychiatry)  Giuseppe Galvez MD as Consulting Physician (Neurology)  Candelaria Zamora MD as Consulting Physician (Dermatology)  Christina Combs NP as Nurse Practitioner (Obstetrics and Gynecology)  Derrick Crane III, OD (Optometry)  Andrey Stark NP as Nurse Practitioner (Obstetrics and Gynecology)  Jelani Alegria DPM as Consulting Physician (Podiatry)  Stanley Jane MD as Consulting Physician (Orthopedic Surgery)  Dale Krishnamurthy MD as Consulting Physician (Orthopedic Surgery)         Exam      Review of Systems:  (as noted above)      Physical Exam:  Physical Exam  Vitals reviewed.   Constitutional:       General: She is not in acute distress.     Appearance: Normal appearance. She is normal weight. She is not ill-appearing.   HENT:      Head: Normocephalic and atraumatic.   Eyes:      General:         Right eye: No foreign body.         Left eye: No foreign body.      Extraocular Movements: Extraocular movements intact.      Conjunctiva/sclera:      Right eye: Right conjunctiva is injected.      Left eye: Left conjunctiva is injected.      Pupils: Pupils are equal, round, and reactive to light.   Cardiovascular:      Rate and Rhythm: Normal rate.      Pulses: Normal pulses.   Pulmonary:      Effort: Pulmonary effort is normal.   Abdominal:      General: Abdomen is flat.   Musculoskeletal:         General: Normal range of motion.      Cervical back: Normal range of motion.   Neurological:      Mental Status: She is alert and oriented to person, place, and time. Mental status is at baseline.   Psychiatric:         Mood and Affect: Mood normal.         Behavior: Behavior normal.       Vitals:    03/11/25 1103   BP: 134/74   BP Location: Left arm   Patient  "Position: Sitting   Pulse: 82   Resp: 18   Temp: 97.1 °F (36.2 °C)   TempSrc: Temporal   SpO2: 96%   Weight: 69.2 kg (152 lb 8.9 oz)   Height: 5' 6" (1.676 m)      Body mass index is 24.62 kg/m².             Assessment & Plan      1. Viral conjunctivitis of both eyes  - olopatadine (PATANOL) 0.1 % ophthalmic solution; Place 1 drop into both eyes 2 (two) times daily.  Dispense: 5 mL; Refill: 0    2. Osteopenia after menopause  - DXA Bone Density Axial Skeleton 1 or more sites; Future       Assessment & Plan    IMPRESSION:  - Assessed conjunctivitis as likely viral or allergic rather than bacterial based on presentation  - Determined antibiotic eye drops no longer necessary; decided to switch to drops for viral/allergic conjunctivitis  - Considered chronic eye redness and regular use of Lumify drops  - Evaluated need for bone density scan due to history of osteopenia    CONJUNCTIVITIS:  - Evaluated the patient's ongoing symptoms of pink eye, including watery eyes, redness, swollen eyelids, and discharge.  - Assessed the condition as ongoing conjunctivitis, likely not bacterial based on presentation.  - Explained the difference between bacterial and viral/allergic conjunctivitis.  - Discussed the contagiousness of viral conjunctivitis and emphasized the importance of hand hygiene.  - Clarified that conjunctivitis can cause swollen eyelids.  - Discontinued antibiotic eye drops and use of Lumify drops temporarily.  - Prescribed new eye drops for viral/allergic conjunctivitis, to be used 2 times daily for at least 1 week until cleared.  - Instructed the patient to contact the office if new eye drops do not help or if symptoms worsen.  - Observed redness and puffiness around the eyes.  - Patient to practice good hand hygiene, especially after touching eyes.    ALLERGIES:  - Advised the patient to continue using Zyrtec for allergies.  - Assessed the condition as possibly related to allergic reaction to cosmetics.  - " Recommend continuing antihistamine use.  - Continued Zyrtec (cetirizine) for allergies.    EYE IRRITATION:  - Prescribed new eye drops for symptom management.  - Monitored ongoing eye redness and irritation, especially after using mascara.  - Observed redness and irritation around the eyes.  - Acknowledged potential allergic reaction to cosmetics.    OSTEOPENIA:  - Noted the patient's report of slight pain and possible osteopenia.  - Ordered a full body bone density scan to be performed at the hospital.    FOLLOW UP:  - Follow up when contacted to schedule Medicare annual wellness visit.           --------------------------------------------      Health Maintenance:  Health Maintenance         Date Due Completion Date    RSV Vaccine (Age 60+ and Pregnant patients) (1 - 1-dose 75+ series) Never done ---    COVID-19 Vaccine (4 - 2024-25 season) 09/01/2024 1/4/2022    DEXA Scan 02/27/2025 2/27/2023    Shingles Vaccine (1 of 2) 03/18/2025 (Originally 12/25/1997) ---    TETANUS VACCINE 06/10/2029 6/10/2019    Override on 9/6/2017: Declined    Lipid Panel 07/03/2029 7/3/2024            Health maintenance reviewed and DEXA ordered    Follow Up:  No follow-ups on file.       - The patient is given an After Visit Summary that lists all medications with directions, allergies, education, orders placed during this encounter and follow-up instructions.      - I have reviewed the patient's medical information including past medical, family, and social history sections including the medications and allergies.      - We discussed the patient's current medications.     This note was generated with the assistance of ambient listening technology. Verbal consent was obtained by the patient and accompanying visitor(s) for the recording of patient appointment to facilitate this note. I attest to having reviewed and edited the generated note for accuracy, though some syntax or spelling errors may persist. Please contact the author of this  note for any clarification.      This note was created by combination of typed  and MModal dictation.  Transcription errors may be present.  If there are any questions, please contact me.     Shannan Mehta PA-C  [facility]             [1]   Social History  Socioeconomic History    Marital status:    Tobacco Use    Smoking status: Never    Smokeless tobacco: Never   Substance and Sexual Activity    Alcohol use: Yes     Alcohol/week: 3.0 standard drinks of alcohol     Types: 2 Shots of liquor, 1 Drinks containing 0.5 oz of alcohol per week     Comment: 1-2 cocktail weekly    Drug use: Not Currently    Sexual activity: Yes     Partners: Male     Birth control/protection: Post-menopausal     Comment:  2015     Social Drivers of Health     Financial Resource Strain: Low Risk  (12/5/2023)    Overall Financial Resource Strain (CARDIA)     Difficulty of Paying Living Expenses: Not hard at all   Food Insecurity: No Food Insecurity (12/5/2023)    Hunger Vital Sign     Worried About Running Out of Food in the Last Year: Never true     Ran Out of Food in the Last Year: Never true   Transportation Needs: No Transportation Needs (12/5/2023)    PRAPARE - Transportation     Lack of Transportation (Medical): No     Lack of Transportation (Non-Medical): No   Physical Activity: Inactive (12/5/2023)    Exercise Vital Sign     Days of Exercise per Week: 0 days     Minutes of Exercise per Session: 0 min   Stress: Stress Concern Present (12/5/2023)    [employer] - Occupational Stress Questionnaire     Feeling of Stress : Very much   Housing Stability: Low Risk  (12/5/2023)    Housing Stability Vital Sign     Unable to Pay for Housing in the Last Year: No     Number of Places Lived in the Last Year: 1     Unstable Housing in the Last Year: No   [2]   Current Outpatient Medications   Medication Sig Dispense Refill    calcium-vitamin D3-vitamin K  500-500-40 mg-unit-mcg Chew Take 1 tablet by mouth once daily.      cartilage/collagen/bor/hyalur (JOINT HEALTH ORAL) Take by mouth.      Chol/Gl/Ser/RNA/Phen/Prg/Hb150 (SHARPER FOCUS ORAL) Take by mouth.      cholecalciferol, vitamin D3, 2,000 unit Cap Take 1,000 Units by mouth once daily.      conjugated estrogens (PREMARIN) vaginal cream PLACE 0.5 GRAM VAGINALLY 3 TIMES A WEEK AS DIRECTED 30 g 5    LACTOBACILLUS ACIDOPHILUS (PROBIOTIC ORAL) Take by mouth.      meloxicam (MOBIC) 15 MG tablet TAKE 1 TABLET(15 MG) BY MOUTH DAILY WITH FOOD AS NEEDED FOR PAIN 30 tablet 3    MV-MN/VITC/ASBNA/GLU/KWAME/ (A[place]ORNE, ASCORBATE SODIUM, ORAL) Take by mouth.      neomycin-polymyxin-dexamethasone (DEXACINE) 3.5 mg/g-10,000 unit/g-0.1 % Oint every 6 (six) hours.      NON FORMULARY MEDICATION Daily. Nutrafol      nystatin (MYCOSTATIN) powder Apply to affected area 2 times daily PRN 60 g 3    omega 3-dha-epa-fish oil 100-160-1,000 mg Cap       tobramycin sulfate 0.3% (TOBREX) 0.3 % ophthalmic solution 1-2 drops topically twice daily to affected toe(s). 5 mL 3    tretinoin (RETIN-A) 0.05 % cream APPLY PEA SIZED DROP TO FACE NIGHTLY. WASH OFF INNTHE MORNING. SFP ALWAYS      TURMERIC, BULK, MISC Take 1,000 mg by mouth once daily.      olopatadine (PATANOL) 0.1 % ophthalmic solution Place 1 drop into both eyes 2 (two) times daily. 5 mL 0     No current facility-administered medications for this visit.

## 2025-04-23 ENCOUNTER — TELEPHONE (OUTPATIENT)
Dept: OBSTETRICS AND GYNECOLOGY | Facility: HOSPITAL | Age: 80
End: 2025-04-23
Payer: MEDICARE

## 2025-04-23 ENCOUNTER — TELEPHONE (OUTPATIENT)
Dept: OBSTETRICS AND GYNECOLOGY | Facility: CLINIC | Age: 80
End: 2025-04-23
Payer: MEDICARE

## 2025-04-23 RX ORDER — ESTRADIOL 0.1 MG/G
CREAM VAGINAL
Qty: 42.5 G | Refills: 2 | Status: SHIPPED | OUTPATIENT
Start: 2025-04-23

## 2025-04-23 NOTE — TELEPHONE ENCOUNTER
----- Message from Med Assistant Flower sent at 4/23/2025  3:10 PM CDT -----  Hi Dr. Calle, I spoke with Katiana and she stated that her premarin cream is over $150. The directions states that she use It 3 times a week but she said that you told her to use it everyday. Can you send in a prescription stating that she use it everyday for it to be cheaper?  ----- Message -----  From: Christine Garces  Sent: 4/23/2025   3:02 PM CDT  To: Alva Mcnally Staff    Type: RX Refill RequestWho Called: pt calling so she can get it cheaper. She states she never paid high price before. Lv pt Have you contacted your pharmacy:yesRefill or New Rx:conjugated estrogens (PREMARIN) vaginal cream - RX Name and Strength:How is the patient currently taking it? (ex. 1XDay):Is this a 30 day or 90 day RX:Preferred Pharmacy with phone number:[id number] - [place], [place] - [address] AT [address] [address]Gfwfd: [phone] Fax: [phone] or Mail Order:Ordering Provider:Would the patient rather a call back or a response via [facility]? [facility] Call Back Number:[phone] (home) Additional Information:

## 2025-04-23 NOTE — TELEPHONE ENCOUNTER
----- Message from Christine sent at 4/23/2025  3:00 PM CDT -----  Type: RX Refill RequestWho Called: pt calling so she can get it cheaper. She states she never paid high price before. Lv pt Have you contacted your pharmacy:yesRefill or New Rx:conjugated estrogens (PREMARIN) vaginal cream - RX Name and Strength:How is the patient currently taking it? (ex. 1XDay):Is this a 30 day or 90 day RX:Preferred Pharmacy with phone number:[id number] [place], [place] - [address] AT [address] [address]Gwfeo: [phone] Fax: [phone] or Mail Order:Ordering Provider:Would the patient rather a call back or a response via [facility]? [facility] Call Back Number:[phone] (home) Additional Information:

## 2025-06-11 ENCOUNTER — TELEPHONE (OUTPATIENT)
Dept: OBSTETRICS AND GYNECOLOGY | Facility: CLINIC | Age: 80
End: 2025-06-11
Payer: MEDICARE

## 2025-06-11 NOTE — TELEPHONE ENCOUNTER
----- Message from Hillary sent at 6/11/2025  3:17 PM CDT -----  Type:  Needs Medical AdviceWho Called: pt Symptoms (please be specific): Vaginal discomfort (possible prolapse) How long has patient had these symptoms:  few weeks Would the patient rather a call back or a response via [facility]? [facility] Call Back Number:  [phone] Information:

## 2025-06-15 DIAGNOSIS — M25.561 RIGHT MEDIAL KNEE PAIN: ICD-10-CM

## 2025-06-15 NOTE — TELEPHONE ENCOUNTER
Care Due:                  Date            Visit Type   Department     Provider  --------------------------------------------------------------------------------                                EP -                              PRIMARY      [facility] INTERNAL  Last Visit: 07-      CARE ([facility])   MEDICINE       Tucker Ren  Next Visit: None Scheduled  None         None Found                                                            Last  Test          Frequency    Reason                     Performed    Due Date  --------------------------------------------------------------------------------    Office Visit  12 months..  meloxicam................  07-   07-    CBC.........  12 months..  meloxicam................  07- 06-    CMP.........  12 months..  meloxicam................  07- 06-    [facility] Embedded Care Due Messages. Reference number: [id number].   6/15/2025 3:07:56 PM CDT

## 2025-06-16 RX ORDER — MELOXICAM 15 MG/1
TABLET ORAL
Qty: 30 TABLET | Refills: 3 | Status: SHIPPED | OUTPATIENT
Start: 2025-06-16

## 2025-06-16 NOTE — TELEPHONE ENCOUNTER
Refill Routing Note   Medication(s) are not appropriate for processing by [facility] for the following reason(s):        Outside of protocol    [facility] action(s):  Route   Requires appointment : Yes   Requires labs : Yes             Appointments  past 12m or future 3m with PCP    Date Provider   Last Visit   7/10/2024 Tucker Ren MD   Next Visit   Visit date not found Tucker Ren MD   ED visits in past 90 days: 0        Note composed:7:16 AM 06/16/2025

## 2025-07-08 ENCOUNTER — LAB VISIT (OUTPATIENT)
Dept: LAB | Facility: HOSPITAL | Age: 80
End: 2025-07-08
Payer: MEDICARE

## 2025-07-08 ENCOUNTER — TELEPHONE (OUTPATIENT)
Dept: INTERNAL MEDICINE | Facility: CLINIC | Age: 80
End: 2025-07-08
Payer: MEDICARE

## 2025-07-08 DIAGNOSIS — Z00.00 ROUTINE PHYSICAL EXAMINATION: ICD-10-CM

## 2025-07-08 DIAGNOSIS — Z00.00 ROUTINE PHYSICAL EXAMINATION: Primary | ICD-10-CM

## 2025-07-08 LAB
ALBUMIN SERPL BCP-MCNC: 4.1 G/DL (ref 3.5–5.2)
ALP SERPL-CCNC: 69 UNIT/L (ref 40–150)
ALT SERPL W/O P-5'-P-CCNC: 15 UNIT/L (ref 10–44)
ANION GAP (OHS): 5 MMOL/L (ref 8–16)
AST SERPL-CCNC: 15 UNIT/L (ref 11–45)
BILIRUB SERPL-MCNC: 0.7 MG/DL (ref 0.1–1)
BUN SERPL-MCNC: 16 MG/DL (ref 8–23)
CALCIUM SERPL-MCNC: 9.4 MG/DL (ref 8.7–10.5)
CHLORIDE SERPL-SCNC: 105 MMOL/L (ref 95–110)
CHOLEST SERPL-MCNC: 182 MG/DL (ref 120–199)
CHOLEST/HDLC SERPL: 3.8 {RATIO} (ref 2–5)
CO2 SERPL-SCNC: 29 MMOL/L (ref 23–29)
CREAT SERPL-MCNC: 0.9 MG/DL (ref 0.5–1.4)
GFR SERPLBLD CREATININE-BSD FMLA CKD-EPI: >60 ML/MIN/1.73/M2
GLUCOSE SERPL-MCNC: 101 MG/DL (ref 70–110)
HDLC SERPL-MCNC: 48 MG/DL (ref 40–75)
HDLC SERPL: 26.4 % (ref 20–50)
LDLC SERPL CALC-MCNC: 109.2 MG/DL (ref 63–159)
NONHDLC SERPL-MCNC: 134 MG/DL
POTASSIUM SERPL-SCNC: 4.2 MMOL/L (ref 3.5–5.1)
PROT SERPL-MCNC: 6.7 GM/DL (ref 6–8.4)
SODIUM SERPL-SCNC: 139 MMOL/L (ref 136–145)
TRIGL SERPL-MCNC: 124 MG/DL (ref 30–150)

## 2025-07-08 PROCEDURE — 36415 COLL VENOUS BLD VENIPUNCTURE: CPT | Mod: HCNC,PO

## 2025-07-08 PROCEDURE — 80053 COMPREHEN METABOLIC PANEL: CPT | Mod: HCNC

## 2025-07-08 PROCEDURE — 82465 ASSAY BLD/SERUM CHOLESTEROL: CPT | Mod: HCNC

## 2025-07-14 ENCOUNTER — OFFICE VISIT (OUTPATIENT)
Dept: INTERNAL MEDICINE | Facility: CLINIC | Age: 80
End: 2025-07-14
Payer: MEDICARE

## 2025-07-14 VITALS
HEIGHT: 66 IN | OXYGEN SATURATION: 97 % | HEART RATE: 80 BPM | BODY MASS INDEX: 24.06 KG/M2 | DIASTOLIC BLOOD PRESSURE: 76 MMHG | WEIGHT: 149.69 LBS | SYSTOLIC BLOOD PRESSURE: 124 MMHG

## 2025-07-14 DIAGNOSIS — K62.89 ANAL IRRITATION: ICD-10-CM

## 2025-07-14 DIAGNOSIS — E78.5 HYPERLIPIDEMIA, UNSPECIFIED HYPERLIPIDEMIA TYPE: ICD-10-CM

## 2025-07-14 DIAGNOSIS — M17.11 PRIMARY OSTEOARTHRITIS OF RIGHT KNEE: ICD-10-CM

## 2025-07-14 DIAGNOSIS — Z78.0 POSTMENOPAUSAL: ICD-10-CM

## 2025-07-14 DIAGNOSIS — Z00.00 ROUTINE PHYSICAL EXAMINATION: Primary | ICD-10-CM

## 2025-07-14 DIAGNOSIS — Z12.31 ENCOUNTER FOR SCREENING MAMMOGRAM FOR MALIGNANT NEOPLASM OF BREAST: ICD-10-CM

## 2025-07-14 DIAGNOSIS — N81.2 UTEROVAGINAL PROLAPSE, INCOMPLETE: ICD-10-CM

## 2025-07-14 PROCEDURE — 99999 PR PBB SHADOW E&M-EST. PATIENT-LVL V: CPT | Mod: PBBFAC,HCNC,, | Performed by: INTERNAL MEDICINE

## 2025-07-14 RX ORDER — MULTIVITAMIN
1 TABLET ORAL DAILY
COMMUNITY

## 2025-07-14 NOTE — PROGRESS NOTES
Subjective:       Patient ID: Katiana Hagan is a 79 y.o. female.    Chief Complaint: Annual Exam    Patient here for annual exam.  Generally doing well.  She denies any chest pain shortness a breath fevers or chills.  No cough or wheeze.  She occasionally has some arthralgias, tingling in her arms or legs if she sits in 1 position too long but it almost immediately changes if she changes position.  He remains active, goes dancing.  Has no other acute complaints or problems.  We reviewed her prescriptions.    We reviewed her labs in detail and they were all very stable.    We reviewed that she is up-to-date on colonoscopy but needs a mammogram and bone density.  She is having some difficulty with complete emptying after bowel movements and feels like she has to write a little more than usual.  She may benefit from increased fiber in her diet but she would like to see erectile specialist.      Review of Systems   Constitutional:  Negative for appetite change, chills and fever.   HENT:  Negative for nosebleeds and sore throat.    Eyes:  Negative for pain and visual disturbance.   Respiratory:  Negative for cough, shortness of breath and wheezing.    Cardiovascular:  Negative for chest pain and leg swelling.   Gastrointestinal:  Negative for abdominal pain, constipation and diarrhea.   Endocrine: Negative for polyuria.   Genitourinary:  Negative for difficulty urinating, hematuria and vaginal bleeding.   Musculoskeletal:  Negative for arthralgias, back pain, gait problem and neck pain.   Integumentary:  Negative for pallor and rash.   Neurological:  Negative for tremors, seizures and headaches.   Hematological:  Does not bruise/bleed easily.   Psychiatric/Behavioral:  Negative for dysphoric mood. The patient is not nervous/anxious.            Past Medical History:   Diagnosis Date    Anxiety 8/13/2019    Other and unspecified hyperlipidemia 10/29/2013    Recurrent major depressive disorder, in remission 11/30/2020     Urge incontinence 2/9/2017    Uterovaginal prolapse, incomplete 2/9/2017    Venous insufficiency of both lower extremities     Vitamin D deficiency 9/6/2017     Past Surgical History:   Procedure Laterality Date    APPENDECTOMY  age 18    AUGMENTATION OF BREAST Bilateral 1988    Breast augmentation  1988    COLONOSCOPY N/A 1/13/2025    Procedure: COLONOSCOPY;  Surgeon: Rajat Arboleda MD;  Location: [facility];  Service: Endoscopy;  Laterality: N/A;  Ref Dr Alva Torres-Suprep-Portal-ASul  10/9 pt r/s suprep to portal.cf  11/7/24-Precall complete-DS  11/11 pt rs suprep (pt already has) portal AE/ML  1/3/25- pc complete. DBM  1/7-pt r/s, updated [facility] to [facility]    COSMETIC SURGERY  03/08/2016    facelift    DILATION AND CURETTAGE OF UTERUS      MYOMECTOMY        Problem List[1]     Objective:      Physical Exam  Constitutional:       General: She is not in acute distress.     Appearance: She is well-developed.   HENT:      Head: Normocephalic and atraumatic.      Right Ear: Tympanic membrane, ear canal and external ear normal.      Left Ear: Tympanic membrane, ear canal and external ear normal.      Mouth/Throat:      Pharynx: No oropharyngeal exudate or posterior oropharyngeal erythema.   Eyes:      General: No scleral icterus.     Conjunctiva/sclera: Conjunctivae normal.      Pupils: Pupils are equal, round, and reactive to light.   Neck:      Thyroid: No thyromegaly.   Cardiovascular:      Rate and Rhythm: Normal rate and regular rhythm.      Pulses: Normal pulses.      Heart sounds: No murmur heard.  Pulmonary:      Effort: Pulmonary effort is normal.      Breath sounds: Normal breath sounds. No wheezing.   Abdominal:      General: Bowel sounds are normal. There is no distension.      Palpations: Abdomen is soft.      Tenderness: There is no abdominal tenderness.   Musculoskeletal:         General: No tenderness.      Cervical back: Normal range of motion and neck supple.      Right lower leg: No  "edema.      Left lower leg: No edema.   Lymphadenopathy:      Cervical: No cervical adenopathy.   Skin:     Coloration: Skin is not jaundiced.      Findings: No rash.   Neurological:      General: No focal deficit present.      Mental Status: She is alert and oriented to person, place, and time.   Psychiatric:         Mood and Affect: Mood normal.         Behavior: Behavior normal.         Assessment:       Problem List Items Addressed This Visit          Cardiac/Vascular    Hyperlipidemia       Renal/    Uterovaginal prolapse, incomplete       Orthopedic    Primary osteoarthritis of right knee     Other Visit Diagnoses         Routine physical examination    -  Primary      Anal irritation        Relevant Orders    Ambulatory referral/consult to Colorectal Surgery      Postmenopausal        Relevant Orders    DXA Bone Density Axial Skeleton 1 or more sites      Encounter for screening mammogram for malignant neoplasm of breast        Relevant Orders    Mammo Digital Screening Bilat w/ Alan (XPD)            Plan:         Katiana was seen today for annual exam.    Diagnoses and all orders for this visit:    Routine physical examination    Hyperlipidemia, unspecified hyperlipidemia type  Comments:  much improved    Primary osteoarthritis of right knee    Uterovaginal prolapse, incomplete    Anal irritation  -     Ambulatory referral/consult to Colorectal Surgery; Future    Postmenopausal  -     DXA Bone Density Axial Skeleton 1 or more sites; Future    Encounter for screening mammogram for malignant neoplasm of breast  -     Mammo Digital Screening Bilat w/ Alan (XPD); Future                     Portions of this note may have been created with voice recognition software. Occasional "wrong-word" or "sound-a-like" substitutions may have occurred due to the inherent limitations of voice recognition software. Please, read the note carefully and recognize, using context, where substitutions have occurred.       [1] "   Patient Active Problem List  Diagnosis    Hyperlipidemia    Venous insufficiency of both lower extremities    Urge incontinence    Nocturia    Uterovaginal prolapse, incomplete    Vitamin D deficiency    Anxiety    BMI 24.0-24.9, adult    Recurrent major depressive disorder, in remission    Primary osteoarthritis of right knee    Age-related osteoporosis without current pathological fracture    Pelvic floor dysfunction

## 2025-07-15 ENCOUNTER — PATIENT MESSAGE (OUTPATIENT)
Dept: INTERNAL MEDICINE | Facility: CLINIC | Age: 80
End: 2025-07-15
Payer: MEDICARE

## 2025-07-15 DIAGNOSIS — R06.02 SOB (SHORTNESS OF BREATH): Primary | ICD-10-CM

## 2025-07-15 NOTE — TELEPHONE ENCOUNTER
LOV with Tucker Ren MD , 7/14/2025    Pt was seen in clinic yesterday, forgot to mention she has been out of breath lately with exertion and experiencing cramps in her hands.

## 2025-07-16 ENCOUNTER — OFFICE VISIT (OUTPATIENT)
Dept: OBSTETRICS AND GYNECOLOGY | Facility: CLINIC | Age: 80
End: 2025-07-16
Payer: MEDICARE

## 2025-07-16 VITALS
DIASTOLIC BLOOD PRESSURE: 68 MMHG | WEIGHT: 147.69 LBS | SYSTOLIC BLOOD PRESSURE: 120 MMHG | BODY MASS INDEX: 23.84 KG/M2

## 2025-07-16 DIAGNOSIS — N76.0 ACUTE VAGINITIS: Primary | ICD-10-CM

## 2025-07-16 DIAGNOSIS — N81.2 UTEROVAGINAL PROLAPSE, INCOMPLETE: ICD-10-CM

## 2025-07-16 PROCEDURE — 3074F SYST BP LT 130 MM HG: CPT | Mod: CPTII,HCNC,S$GLB, | Performed by: PHYSICIAN ASSISTANT

## 2025-07-16 PROCEDURE — 1101F PT FALLS ASSESS-DOCD LE1/YR: CPT | Mod: CPTII,HCNC,S$GLB, | Performed by: PHYSICIAN ASSISTANT

## 2025-07-16 PROCEDURE — 81515 NFCT DS BV&VAGINITIS DNA ALG: CPT | Mod: HCNC | Performed by: PHYSICIAN ASSISTANT

## 2025-07-16 PROCEDURE — 1159F MED LIST DOCD IN RCRD: CPT | Mod: CPTII,HCNC,S$GLB, | Performed by: PHYSICIAN ASSISTANT

## 2025-07-16 PROCEDURE — 99999 PR PBB SHADOW E&M-EST. PATIENT-LVL III: CPT | Mod: PBBFAC,HCNC,, | Performed by: PHYSICIAN ASSISTANT

## 2025-07-16 PROCEDURE — 99213 OFFICE O/P EST LOW 20 MIN: CPT | Mod: HCNC,S$GLB,, | Performed by: PHYSICIAN ASSISTANT

## 2025-07-16 PROCEDURE — 3288F FALL RISK ASSESSMENT DOCD: CPT | Mod: CPTII,HCNC,S$GLB, | Performed by: PHYSICIAN ASSISTANT

## 2025-07-16 PROCEDURE — 3078F DIAST BP <80 MM HG: CPT | Mod: CPTII,HCNC,S$GLB, | Performed by: PHYSICIAN ASSISTANT

## 2025-07-16 NOTE — PROGRESS NOTES
Katiana Hagan is a 79 y.o. female  presents with complaint of vaginal discomfort. She states she feels like she has organs in her vagina that move around. She had irritation a few weeks ago but now resolved. She also reports she was taking premarin but it is no longer covered by her insurance, now on estrace. She has not been on estrace long, unsure of efficacy compared to premarin. Denies nausea, vomiting, diarrhea, constipation, dysuria, dyspareunia, abdominal pain. She would not like STD screening at this visit. No other concerns or complaints at this visit.       Katiana Hagan is a 78 y.o. female   for annual well woman exam. Patient's last menstrual period was 1998 (approximate)..  She has no unusual complaints.  Menopause @ 52 age, denies HRT besides vaginal estrace recently, denies VB. Pt with incomplete vaginal prolapse - no symptoms. Having BM daily    Patient presents for evaluation of an abnormal vaginal irritation. Symptoms have been present for a few days. Vaginal symptoms: local irritation and vulvar itching. Contraception: post menopausal status. She denies discharge, odor, and urinary symptoms of dysuria, hematuria, and fever. Sexually transmitted infection risk: very low risk of STD exposure. SA male partner, does not use condoms. Newly SA again, has been taking bubble baths and wearing non cotton underwear. This is the extent of the patient's complaints at this time.     Katiana Hagan is a 77 y.o. female  here for a GYN exam for evaluation of vulvar irritation which has been occurring for the past 2 months. She develops bumps in the groin which she rubs off but they come back. She has also been somewhat moist due to having urge incontinence, has trouble holding her urine once the urge occurs. She is menopausal since age 52. denies break through bleeding.  reports vaginal itching or irritation. Denies vaginal discharge. She is not sexually active.   History of abnormal pap: No Last Pap: approximate date 2016 and was normal. Last MMG: normal-2023-routine follow-up in 12 months  Last Colonoscopy:  FITT kit only    Past Medical History:   Diagnosis Date    Anxiety 2019    Other and unspecified hyperlipidemia 10/29/2013    Recurrent major depressive disorder, in remission 2020    Urge incontinence 2017    Uterovaginal prolapse, incomplete 2017    Venous insufficiency of both lower extremities     Vitamin D deficiency 2017     Past Surgical History:   Procedure Laterality Date    APPENDECTOMY  age 18    AUGMENTATION OF BREAST Bilateral     Breast augmentation      COLONOSCOPY N/A 2025    Procedure: COLONOSCOPY;  Surgeon: Rajat Arboleda MD;  Location: [facility];  Service: Endoscopy;  Laterality: N/A;  Ref Dr Alva Torres-Suprep-Portal-ASul  10/9 pt r/s suprep to portal.cf  24-Precall complete-DS   pt rs suprep (pt already has) portal AE/ML  1/3/25- pc complete. DBM  -pt r/s, updated instructions sent to portal-\[facility] [place] [facility]\""    COSMETIC SURGERY  2016    facelift    DILATION AND CURETTAGE OF UTERUS      MYOMECTOMY       Social History[1]  Family History   Problem Relation Name Age of Onset    Breast cancer Maternal Aunt      No Known Problems Father      No Known Problems Mother      Diabetes Sister      Schizophrenia Son      Heart disease Sister      No Known Problems Son      Colon cancer Neg Hx      Hypertension Neg Hx      Ovarian cancer Neg Hx      Stroke Neg Hx       OB History    Para Term  AB Living   2 2 2   2   SAB IAB Ectopic Multiple Live Births       2      # Outcome Date GA Lbr Curt/2nd Weight Sex Type Anes PTL Lv   2 Term      Vag-Spont  N BARBY   1 Term      Vag-Spont  N BARBY       /68   Wt 67 kg (147 lb 11.3 oz)   LMP 1998 (Approximate)   BMI 23.84 kg/m²     ROS:  GENERAL: No fever, chills, fatigability or weight loss.  VULVAR: No pain, no lesions and  itching.  VAGINAL: No relaxation, no abnormal bleeding and no lesions. + discomfort   ABDOMEN: No abdominal pain. Denies nausea. Denies vomiting. No diarrhea. No constipation  BREAST: Denies pain. No lumps. No discharge.  URINARY: No incontinence, no nocturia, no frequency and no dysuria.  CARDIOVASCULAR: No chest pain. No shortness of breath. No leg cramps.  NEUROLOGICAL: No headaches. No vision changes.    PHYSICAL EXAM:   APPEARANCE: Well nourished, well developed, in no acute distress.  AFFECT: WNL, alert and oriented x 3.  SKIN: No hirsutism or acne.  CHEST: Good respiratory effort.   ABDOMEN: Soft. No tenderness or masses. No hepatosplenomegaly. No hernias.  PELVIC: ATROPHIC EXTERNAL FEMALE GENITALIA without lesions. Normal hair distribution. Adequate perineal body, normal urethral meatus. VAGINA without lesions +minimal white discharge, +normal vaginal redundant vaginal tissue . CERVIX without lesions, discharge or tenderness. +cystocele. Bimanual exam shows uterus to be normal size, regular, mobile and nontender. Adnexa without masses or tenderness.  EXTREMITIES: No edema.    ASSESSMENT and PLAN:    ICD-10-CM ICD-9-CM    1. Acute vaginitis  N76.0 616.10 Vaginosis Screen by DNA Probe      2. Age-related osteoporosis without current pathological fracture  M81.0 733.01       3. Uterovaginal prolapse, incomplete  N81.2 618.2           - patient no longer with irritation, will screen for BV/Yeast and treat pending results   - recommended continue with estrace cream, if not as effect as premarin - will switch back to premarin and assist with PA coverage   - discussed normal redundancy of vaginal tissue, no abnormal findings   - pt originally scheduled for [employer], discussed that her insurance does not allow annual ever 12 months, rather q 24 months - she will call insurance to check. Will schedule [employer] if they will cover yearly exams.     FOLLOW UP: PRN lack of improvement.  Petty Lopez PA-C         [1]   Social  History  Tobacco Use    Smoking status: Never    Smokeless tobacco: Never   Substance Use Topics    Alcohol use: Yes     Alcohol/week: 3.0 standard drinks of alcohol     Types: 2 Shots of liquor, 1 Drinks containing 0.5 oz of alcohol per week     Comment: 1-2 cocktail weekly    Drug use: Not Currently

## 2025-07-18 LAB
BACTERIAL VAGINOSIS DNA (OHS): NOT DETECTED
CANDIDA GLABRATA/KRUSEI DNA (OHS): NOT DETECTED
CANDIDA SPECIES DNA (OHS): DETECTED
TRICHOMONAS VAGINALIS DNA (OHS): NOT DETECTED

## 2025-08-01 ENCOUNTER — HOSPITAL ENCOUNTER (OUTPATIENT)
Dept: RADIOLOGY | Facility: HOSPITAL | Age: 80
Discharge: HOME | End: 2025-08-01
Attending: INTERNAL MEDICINE
Payer: MEDICARE

## 2025-08-01 DIAGNOSIS — Z12.31 ENCOUNTER FOR SCREENING MAMMOGRAM FOR MALIGNANT NEOPLASM OF BREAST: ICD-10-CM

## 2025-08-01 PROCEDURE — 77063 BREAST TOMOSYNTHESIS BI: CPT | Mod: TC,HCNC

## 2025-08-01 PROCEDURE — 77063 BREAST TOMOSYNTHESIS BI: CPT | Mod: 26,HCNC,, | Performed by: RADIOLOGY

## 2025-08-01 PROCEDURE — 77067 SCR MAMMO BI INCL CAD: CPT | Mod: 26,HCNC,, | Performed by: RADIOLOGY

## 2025-08-04 ENCOUNTER — PATIENT MESSAGE (OUTPATIENT)
Dept: INTERNAL MEDICINE | Facility: CLINIC | Age: 80
End: 2025-08-04
Payer: MEDICARE

## 2025-08-04 ENCOUNTER — TELEPHONE (OUTPATIENT)
Dept: OBSTETRICS AND GYNECOLOGY | Facility: CLINIC | Age: 80
End: 2025-08-04
Payer: MEDICARE

## 2025-08-04 NOTE — TELEPHONE ENCOUNTER
Called and spoke to pt. Confirmed appt with provider for tmw 8/5 at 10a. Pt CHECO Scott MA  [phone]    Copied from CRM #[id number]. Topic: Appointments - Appointment Confirmation  >> Aug 4, 2025 10:06 AM Simone wrote:  Type:  Sooner Appointment Request    Patient is requesting a sooner appointment.  Patient declined first available appointment listed as well as another facility and provider .  Patient will not accept being placed on the waitlist and is requesting a message be sent to doctor.    Name of Caller:  Self     When is the first available appointment?  August 18th     Symptoms:   Yeast infection     Would the patient rather a call back or a response via [facility]?  Call back     Best Call Back Number:  [phone]     Additional Information:

## 2025-08-05 ENCOUNTER — OFFICE VISIT (OUTPATIENT)
Dept: OBSTETRICS AND GYNECOLOGY | Facility: CLINIC | Age: 80
End: 2025-08-05
Payer: MEDICARE

## 2025-08-05 ENCOUNTER — TELEPHONE (OUTPATIENT)
Dept: SURGERY | Facility: CLINIC | Age: 80
End: 2025-08-05
Payer: MEDICARE

## 2025-08-05 ENCOUNTER — PATIENT MESSAGE (OUTPATIENT)
Dept: OBSTETRICS AND GYNECOLOGY | Facility: CLINIC | Age: 80
End: 2025-08-05

## 2025-08-05 DIAGNOSIS — N76.0 ACUTE VAGINITIS: Primary | ICD-10-CM

## 2025-08-05 DIAGNOSIS — Z11.3 ENCOUNTER FOR SCREENING FOR INFECTIONS WITH A PREDOMINANTLY SEXUAL MODE OF TRANSMISSION: ICD-10-CM

## 2025-08-05 DIAGNOSIS — N39.0 URINARY TRACT INFECTION WITHOUT HEMATURIA, SITE UNSPECIFIED: ICD-10-CM

## 2025-08-05 DIAGNOSIS — N39.0 URINARY TRACT INFECTION WITHOUT HEMATURIA, SITE UNSPECIFIED: Primary | ICD-10-CM

## 2025-08-05 LAB
BACTERIA #/AREA URNS AUTO: ABNORMAL /HPF
BILIRUB UR QL STRIP.AUTO: NEGATIVE
CLARITY UR: ABNORMAL
COLOR UR AUTO: YELLOW
GLUCOSE UR QL STRIP: NEGATIVE
HGB UR QL STRIP: NEGATIVE
KETONES UR QL STRIP: NEGATIVE
LEUKOCYTE ESTERASE UR QL STRIP: ABNORMAL
MICROSCOPIC COMMENT: ABNORMAL
NITRITE UR QL STRIP: NEGATIVE
PH UR STRIP: 7 [PH]
PROT UR QL STRIP: NEGATIVE
SP GR UR STRIP: 1.02
SQUAMOUS #/AREA URNS AUTO: 13 /HPF
UROBILINOGEN UR STRIP-ACNC: NEGATIVE EU/DL
WBC #/AREA URNS AUTO: 71 /HPF (ref 0–5)
WBC CLUMPS UR QL AUTO: ABNORMAL

## 2025-08-05 PROCEDURE — 1159F MED LIST DOCD IN RCRD: CPT | Mod: CPTII,HCNC,S$GLB,

## 2025-08-05 PROCEDURE — 87491 CHLMYD TRACH DNA AMP PROBE: CPT | Mod: HCNC

## 2025-08-05 PROCEDURE — 81003 URINALYSIS AUTO W/O SCOPE: CPT | Mod: HCNC

## 2025-08-05 PROCEDURE — 1101F PT FALLS ASSESS-DOCD LE1/YR: CPT | Mod: CPTII,HCNC,S$GLB,

## 2025-08-05 PROCEDURE — 99999 PR PBB SHADOW E&M-EST. PATIENT-LVL II: CPT | Mod: PBBFAC,HCNC,,

## 2025-08-05 PROCEDURE — 3288F FALL RISK ASSESSMENT DOCD: CPT | Mod: CPTII,HCNC,S$GLB,

## 2025-08-05 PROCEDURE — 87086 URINE CULTURE/COLONY COUNT: CPT | Mod: HCNC

## 2025-08-05 PROCEDURE — 81515 NFCT DS BV&VAGINITIS DNA ALG: CPT | Mod: HCNC

## 2025-08-05 PROCEDURE — 99213 OFFICE O/P EST LOW 20 MIN: CPT | Mod: HCNC,S$GLB,,

## 2025-08-05 RX ORDER — NITROFURANTOIN 25; 75 MG/1; MG/1
100 CAPSULE ORAL 2 TIMES DAILY
Qty: 14 CAPSULE | Refills: 0 | Status: SHIPPED | OUTPATIENT
Start: 2025-08-05 | End: 2025-08-12

## 2025-08-05 RX ORDER — NYSTATIN 100000 U/G
CREAM TOPICAL 2 TIMES DAILY
Qty: 30 G | Refills: 1 | Status: SHIPPED | OUTPATIENT
Start: 2025-08-05

## 2025-08-05 RX ORDER — NYSTATIN 100000 U/G
CREAM TOPICAL 2 TIMES DAILY
Qty: 30 G | Refills: 1 | Status: SHIPPED | OUTPATIENT
Start: 2025-08-05 | End: 2025-08-05

## 2025-08-05 RX ORDER — FLUCONAZOLE 150 MG/1
150 TABLET ORAL
Qty: 2 TABLET | Refills: 0 | Status: SHIPPED | OUTPATIENT
Start: 2025-08-05

## 2025-08-05 RX ORDER — FLUCONAZOLE 150 MG/1
150 TABLET ORAL
Qty: 2 TABLET | Refills: 0 | Status: SHIPPED | OUTPATIENT
Start: 2025-08-05 | End: 2025-08-05

## 2025-08-05 NOTE — PROGRESS NOTES
Ms Katiana Hagan  is a 79 y.o.  female G 2 P 2 that presents with complaint of vaginal discharge and vaginal itching for past few days. She was recently treated with Diflucal a few weeks ago. She reports a history of incontinence and is followed by urology/ she admits to not doing exercises for bladder control. She also reports that she does not wear cotton underwear because she likes underwear with tummy control. We discussed vaginal health techniques.     She reports white discharge.   She reports itching, denies odor.  She reports h/o vaginitis. Denies nausea, vomiting, diarrhea, constipation, dysuria, dyspareunia, abdominal pain. She would like STD screening at this visit. No other concerns or complaints at this visit.      Past Medical History:   Diagnosis Date    Anxiety 8/13/2019    Other and unspecified hyperlipidemia 10/29/2013    Recurrent major depressive disorder, in remission 11/30/2020    Urge incontinence 2/9/2017    Uterovaginal prolapse, incomplete 2/9/2017    Venous insufficiency of both lower extremities     Vitamin D deficiency 9/6/2017     Past Surgical History:   Procedure Laterality Date    APPENDECTOMY  age 18    AUGMENTATION OF BREAST Bilateral 1988    Breast augmentation  1988    COLONOSCOPY N/A 1/13/2025    Procedure: COLONOSCOPY;  Surgeon: Rajat Arboleda MD;  Location: [facility];  Service: Endoscopy;  Laterality: N/A;  Ref Dr Alva Torres-Suprep-Portal-ASul  10/9 pt r/s suprep to portal.cf  11/7/24-Precall complete-DS  11/11 pt rs suprep (pt already has) portal AE/ML  1/3/25- pc complete. DBM  1/7-pt r/s, updated instructions sent to portal-hospitals    COSMETIC SURGERY  03/08/2016    facelift    DILATION AND CURETTAGE OF UTERUS      MYOMECTOMY       Social History[1]  Family History   Problem Relation Name Age of Onset    Breast cancer Maternal Aunt      No Known Problems Father      No Known Problems Mother      Diabetes Sister      Schizophrenia Son      Heart disease Sister      No  Known Problems Son      Colon cancer Neg Hx      Hypertension Neg Hx      Ovarian cancer Neg Hx      Stroke Neg Hx       OB History    Para Term  AB Living   2 2 2   2   SAB IAB Ectopic Multiple Live Births       2      # Outcome Date GA Lbr Curt/2nd Weight Sex Type Anes PTL Lv   2 Term      Vag-Spont  N BARBY   1 Term      Vag-Spont  N BARBY       LMP 1998 (Approximate)     ROS:  GENERAL: No fever, chills, fatigability or weight loss.  VULVAR: No pain, no lesions and + itching.  VAGINAL: No relaxation, no abnormal bleeding and no lesions.   ABDOMEN: No abdominal pain. Denies nausea. Denies vomiting. No diarrhea. No constipation  BREAST: Denies pain. No lumps. No discharge.  URINARY: No incontinence, no nocturia, no frequency and no dysuria.  CARDIOVASCULAR: No chest pain. No shortness of breath. No leg cramps.  NEUROLOGICAL: No headaches. No vision changes.    PHYSICAL EXAM:  APPEARANCE: Well nourished, well developed, in no acute distress.  AFFECT: WNL, alert and oriented x 3.  CHEST: Good respiratory effort.   ABDOMEN: Soft. No tenderness or masses. No hepatosplenomegaly. No hernias.  ATROPHIC EXTERNAL FEMALE GENITALIA without lesions. Normal hair distribution. Adequate perineal body, normal urethral meatus. VAGINA without lesions +minimal white discharge, +normal vaginal redundant vaginal tissue . CERVIX without lesions, discharge or tenderness. +cystocele. Bimanual exam shows uterus to be normal size, regular, mobile and nontender. Adnexa without masses or tenderness.     ASSESSMENT and PLAN:    ICD-10-CM ICD-9-CM    1. Acute vaginitis  N76.0 616.10 C. trachomatis/N. gonorrhoeae by AMP DNA      Vaginosis Screen by DNA Probe      Urinalysis, Reflex to Urine Culture Urine, Clean Catch      fluconazole (DIFLUCAN) 150 MG Tab      nystatin (MYCOSTATIN) cream      2. Encounter for screening for infections with a predominantly sexual mode of transmission  Z11.3 V74.5 C. trachomatis/N. gonorrhoeae by AMP  DNA          Impression: yeast vulvovaginitis, uncomplicated (fewer than four times a year)  Plan:   Prescriptions as noted in orders  OTC antifungal  Reviewed vulvar/vaginal care and hygiene  Return visit if symptoms persist or progress despite treatment  Discussed local estrogen and lubricants  STD screening    Vaginitis Prevention:  - Avoiding feminine products such as deoderant soaps, body wash, bubble bath, douches, scented toilet paper, deoderant tampons or pads, feminine wipes, chronic pad use, etc. If you wash with soap make sure its hypo allergic (free of added scents or colors)   - Avoiding other vulvovaginal irritants such as long hot baths, humidity, tight, synthetic clothing, chlorine and sitting around in wet bathing suits  - Wearing cotton underwear, avoiding thong underwear and no underwear to bed-wear loose cotton bottoms to bed   - Taking showers instead of baths and use a hair dryer on cool setting afterwards to dry  - Wearing cotton to exercise and shower immediately after exercise and change clothes  - Using polyurethane condoms without spermicide if sexually active and symptoms are triggered by intercourse  - Use laundry detergent without added perfumes or colors   - Do not have sexual intercourse until symptoms have gone away   - Increase your intake of probiotics--you can get these by eating yogurt/greek yogurt or by buying over the counter probiotic supplements     Probiotic Information:   Any probiotic you choose needs to have ALL of the following components (Each needs to be >10 colony forming units [CFUs]):   - L. Acidophilus  - L. Rhamnosus  - L. Fermentum       FOLLOW UP: PRN lack of improvement.    MARTHA Rosario-BC         [1]   Social History  Tobacco Use    Smoking status: Never    Smokeless tobacco: Never   Substance Use Topics    Alcohol use: Yes     Alcohol/week: 3.0 standard drinks of alcohol     Types: 2 Shots of liquor, 1 Drinks containing 0.5 oz of alcohol per week      Comment: 1-2 cocktail weekly    Drug use: Not Currently

## 2025-08-06 ENCOUNTER — OFFICE VISIT (OUTPATIENT)
Dept: SURGERY | Facility: CLINIC | Age: 80
End: 2025-08-06
Payer: MEDICARE

## 2025-08-06 VITALS
BODY MASS INDEX: 24.13 KG/M2 | HEIGHT: 66 IN | DIASTOLIC BLOOD PRESSURE: 81 MMHG | WEIGHT: 150.13 LBS | HEART RATE: 80 BPM | SYSTOLIC BLOOD PRESSURE: 141 MMHG

## 2025-08-06 DIAGNOSIS — R15.0 INCOMPLETE DEFECATION: ICD-10-CM

## 2025-08-06 PROCEDURE — 99999 PR PBB SHADOW E&M-EST. PATIENT-LVL IV: CPT | Mod: PBBFAC,HCNC,, | Performed by: NURSE PRACTITIONER

## 2025-08-06 NOTE — PATIENT INSTRUCTIONS
Pelvic Floor PT will reach out to you to schedule. If you do not hear from them in the next few day, or if you would like to contact them to schedule the number is [phone]     Start citrucel fiber daily and follow bottle instructions    If not improved In about 2 months call Gabrielle and will set up appt with MD

## 2025-08-06 NOTE — PROGRESS NOTES
CRS Office Visit History and Physical    Referring Md:   Tucker Ren Md  [address] [place] [address]    SUBJECTIVE:     Chief Complaint: incomplete evacuation.    History of Present Illness:  The patient is a new patient to this practice.   Course is as follows:  Patient is a 79 y.o. female presents with incomplete evacuation, has to wipe a lot till she gets clean.  No rectal pain or bleeding.  She eats yogurt every day  Years ago constipation.   Symptoms have been present for <1 year.  No anal irritation.   Stool consistency variable, soft. Not diarrhea.   No fiber supplement  Sounds like she did PFPT for urine incontinence 2-3x    Last Colonoscopy: 1/2025 - Four 5 to 10 mm polyps in the ascending colon,                          removed with a cold snare. Resected and retrieved.                          - One 8 mm polyp in the transverse colon, removed                          with a cold snare. Resected and retrieved.                          - One 5 mm polyp in the sigmoid colon, removed                          with a cold snare. Resected and retrieved.                          - Diverticulosis in the sigmoid colon.                          - Non-bleeding internal hemorrhoids.   Family history of colorectal cancer or IBD: no, .    Review of patient's allergies indicates:   Allergen Reactions    Acrylates/beheneth-25 methacrylate copolymer      Fingers get red and inflammed (fake nails)    Hydrocortisone Itching    Methylprednisolone aceponate     Prednisone Nausea And Vomiting and Itching    Tixocortol pivalate Itching       Past Medical History:   Diagnosis Date    Anxiety 8/13/2019    Other and unspecified hyperlipidemia 10/29/2013    Recurrent major depressive disorder, in remission 11/30/2020    Urge incontinence 2/9/2017    Uterovaginal prolapse, incomplete 2/9/2017    Venous insufficiency of both lower extremities     Vitamin D deficiency 9/6/2017     Past Surgical History:  "  Procedure Laterality Date    APPENDECTOMY  age 18    AUGMENTATION OF BREAST Bilateral 1988    Breast augmentation  1988    COLONOSCOPY N/A 1/13/2025    Procedure: COLONOSCOPY;  Surgeon: Rajat Arboleda MD;  Location: [facility];  Service: Endoscopy;  Laterality: N/A;  Ref Dr Alva Chapman-Harpreet Torres-Suprep-Portal-ASul  10/9 pt r/s suprep to portal.cf  11/7/24-Precall complete-DS  11/11 pt rs suprep (pt already has) portal AE/ML  1/3/25- pc complete. DBM  1/7-pt r/s, updated instructions sent to [facility]    COSMETIC SURGERY  03/08/2016    facelift    DILATION AND CURETTAGE OF UTERUS      MYOMECTOMY       Family History   Problem Relation Name Age of Onset    Breast cancer Maternal Aunt      No Known Problems Father      No Known Problems Mother      Diabetes Sister      Schizophrenia Son      Heart disease Sister      No Known Problems Son      Colon cancer Neg Hx      Hypertension Neg Hx      Ovarian cancer Neg Hx      Stroke Neg Hx       Social History[1]     Review of Systems:  Review of Systems   Constitutional:  Negative for chills, fever and weight loss.       OBJECTIVE:     Vital Signs (Most Recent)  BP (!) 141/81 (BP Location: Left arm, Patient Position: Sitting)   Pulse 80   Ht 5' 6" (1.676 m)   Wt 68.1 kg (150 lb 2.1 oz)   LMP 09/24/1998 (Approximate)   BMI 24.23 kg/m²     Physical Exam:  General: White female in no distress   Neuro: Alert and oriented to person, place, and time.  Moves all extremities.     HEENT: No icterus.  Trachea midline  Respiratory: Respirations are even and unlabored, no cough or audible wheezing  Skin: Warm dry and intact, No visible rashes, no jaundice    Labs reviewed today:  Lab Results   Component Value Date    WBC 5.13 07/03/2024    HGB 15.6 07/03/2024    HCT 47.0 07/03/2024     07/03/2024    CHOL 182 07/08/2025    TRIG 124 07/08/2025    HDL 48 07/08/2025    ALT 15 07/08/2025    AST 15 07/08/2025     07/08/2025    K 4.2 07/08/2025     07/08/2025    CREATININE " 0.9 07/08/2025    BUN 16 07/08/2025    CO2 29 07/08/2025    TSH 0.763 07/03/2024    INR 0.9 12/15/2008       Endoscopy reviewed today:  See HPI    Anorectal Exam:    Anal Skin: Normal    Digital Rectal Exam:  Resting Tone normal  No mass, pain or rectocele    Anoscopy:  Verbal consent was obtained.   Clear plastic anoscope inserted.    Hemorrhoids  present  Stigmata of bleeding  absent  Stigmata of prolapsed  absent  Distal rectal mucosa normal      ASSESSMENT/PLAN:     Katiana was seen today for anal itching.    Diagnoses and all orders for this visit:    Incomplete defecation  -     Ambulatory referral/consult to Colorectal Surgery    78 yo F here with incomplete defecation present <1 year.  Could be weak pelvic floor plus stool consistency issue. UTD on c scope from this year.    The patient was instructed to:  Pelvic Floor PT will reach out to you to schedule. If you do not hear from them in the next few day, or if you would like to contact them to schedule the number is [phone]     Start citrucel fiber daily and follow bottle instructions    If not improved In about 2 months call Gabrielle and will set up appt with MARTHA Hightower-NIK  Colon and Rectal Surgery           [1]   Social History  Tobacco Use    Smoking status: Never    Smokeless tobacco: Never   Substance Use Topics    Alcohol use: Yes     Alcohol/week: 3.0 standard drinks of alcohol     Types: 2 Shots of liquor, 1 Drinks containing 0.5 oz of alcohol per week     Comment: 1-2 cocktail weekly    Drug use: Not Currently

## 2025-08-07 LAB — BACTERIA UR CULT: ABNORMAL

## 2025-08-08 LAB
BACTERIAL VAGINOSIS DNA (OHS): NOT DETECTED
C TRACH DNA SPEC QL NAA+PROBE: NOT DETECTED
CANDIDA GLABRATA/KRUSEI DNA (OHS): NOT DETECTED
CANDIDA SPECIES DNA (OHS): NOT DETECTED
CTGC SOURCE (OHS) ORD-325: NORMAL
N GONORRHOEA DNA UR QL NAA+PROBE: NOT DETECTED
TRICHOMONAS VAGINALIS DNA (OHS): NOT DETECTED

## 2025-08-11 ENCOUNTER — TELEPHONE (OUTPATIENT)
Dept: OBSTETRICS AND GYNECOLOGY | Facility: CLINIC | Age: 80
End: 2025-08-11
Payer: MEDICARE

## 2025-08-11 ENCOUNTER — HOSPITAL ENCOUNTER (OUTPATIENT)
Dept: RADIOLOGY | Facility: CLINIC | Age: 80
Discharge: HOME | End: 2025-08-11
Attending: INTERNAL MEDICINE
Payer: MEDICARE

## 2025-08-11 DIAGNOSIS — Z78.0 POSTMENOPAUSAL: ICD-10-CM

## 2025-08-11 PROCEDURE — 77080 DXA BONE DENSITY AXIAL: CPT | Mod: TC,HCNC,PO

## 2025-08-20 ENCOUNTER — OFFICE VISIT (OUTPATIENT)
Dept: INTERNAL MEDICINE | Facility: CLINIC | Age: 80
End: 2025-08-20
Payer: MEDICARE

## 2025-08-20 ENCOUNTER — HOSPITAL ENCOUNTER (OUTPATIENT)
Dept: RADIOLOGY | Facility: HOSPITAL | Age: 80
Discharge: HOME | End: 2025-08-20
Attending: INTERNAL MEDICINE
Payer: MEDICARE

## 2025-08-20 VITALS
HEART RATE: 74 BPM | WEIGHT: 151.44 LBS | SYSTOLIC BLOOD PRESSURE: 120 MMHG | BODY MASS INDEX: 24.34 KG/M2 | HEIGHT: 66 IN | DIASTOLIC BLOOD PRESSURE: 68 MMHG | OXYGEN SATURATION: 98 %

## 2025-08-20 DIAGNOSIS — N39.41 URGE INCONTINENCE: ICD-10-CM

## 2025-08-20 DIAGNOSIS — Z00.00 ENCOUNTER FOR MEDICARE ANNUAL WELLNESS EXAM: Primary | ICD-10-CM

## 2025-08-20 DIAGNOSIS — R26.9 ABNORMALITY OF GAIT AND MOBILITY: ICD-10-CM

## 2025-08-20 DIAGNOSIS — F41.9 ANXIETY: ICD-10-CM

## 2025-08-20 DIAGNOSIS — M62.89 PELVIC FLOOR DYSFUNCTION: ICD-10-CM

## 2025-08-20 DIAGNOSIS — R39.9 UTI SYMPTOMS: ICD-10-CM

## 2025-08-20 DIAGNOSIS — E55.9 VITAMIN D DEFICIENCY: ICD-10-CM

## 2025-08-20 DIAGNOSIS — Z78.0 POSTMENOPAUSAL: ICD-10-CM

## 2025-08-20 DIAGNOSIS — N81.2 UTEROVAGINAL PROLAPSE, INCOMPLETE: ICD-10-CM

## 2025-08-20 DIAGNOSIS — M17.11 PRIMARY OSTEOARTHRITIS OF RIGHT KNEE: ICD-10-CM

## 2025-08-20 DIAGNOSIS — F33.40 RECURRENT MAJOR DEPRESSIVE DISORDER, IN REMISSION: ICD-10-CM

## 2025-08-20 DIAGNOSIS — E78.2 MIXED HYPERLIPIDEMIA: ICD-10-CM

## 2025-08-20 DIAGNOSIS — R06.02 SOB (SHORTNESS OF BREATH): ICD-10-CM

## 2025-08-20 DIAGNOSIS — I87.2 VENOUS INSUFFICIENCY OF BOTH LOWER EXTREMITIES: ICD-10-CM

## 2025-08-20 DIAGNOSIS — M81.0 AGE-RELATED OSTEOPOROSIS WITHOUT CURRENT PATHOLOGICAL FRACTURE: ICD-10-CM

## 2025-08-20 LAB
BACTERIA #/AREA URNS AUTO: NORMAL /HPF
BILIRUB UR QL STRIP.AUTO: NEGATIVE
CLARITY UR: CLEAR
COLOR UR AUTO: YELLOW
GLUCOSE UR QL STRIP: NEGATIVE
HGB UR QL STRIP: NEGATIVE
KETONES UR QL STRIP: NEGATIVE
LEUKOCYTE ESTERASE UR QL STRIP: NEGATIVE
MICROSCOPIC COMMENT: NORMAL
NITRITE UR QL STRIP: NEGATIVE
PH UR STRIP: 7 [PH]
PROT UR QL STRIP: NEGATIVE
RBC #/AREA URNS AUTO: 1 /HPF (ref 0–4)
SP GR UR STRIP: 1.01
SQUAMOUS #/AREA URNS AUTO: 3 /HPF
UROBILINOGEN UR STRIP-ACNC: NEGATIVE EU/DL
WBC #/AREA URNS AUTO: 1 /HPF (ref 0–5)

## 2025-08-20 PROCEDURE — 99999 PR PBB SHADOW E&M-EST. PATIENT-LVL V: CPT | Mod: PBBFAC,,,

## 2025-08-20 PROCEDURE — 81003 URINALYSIS AUTO W/O SCOPE: CPT

## 2025-08-20 PROCEDURE — 71046 X-RAY EXAM CHEST 2 VIEWS: CPT | Mod: TC,HCNC

## 2025-08-20 PROCEDURE — 71046 X-RAY EXAM CHEST 2 VIEWS: CPT | Mod: 26,,, | Performed by: RADIOLOGY

## 2025-08-21 LAB — HOLD SPECIMEN: NORMAL

## 2025-08-22 ENCOUNTER — HOSPITAL ENCOUNTER (OUTPATIENT)
Dept: CARDIOLOGY | Facility: HOSPITAL | Age: 80
Discharge: HOME | End: 2025-08-22
Attending: INTERNAL MEDICINE
Payer: MEDICARE

## 2025-08-22 DIAGNOSIS — R06.02 SOB (SHORTNESS OF BREATH): ICD-10-CM

## 2025-08-22 PROCEDURE — 93010 ELECTROCARDIOGRAM REPORT: CPT | Mod: ,,, | Performed by: INTERNAL MEDICINE

## 2025-08-22 PROCEDURE — 93005 ELECTROCARDIOGRAM TRACING: CPT

## 2025-08-23 LAB
OHS QRS DURATION: 92 MS
OHS QTC CALCULATION: 442 MS

## 2025-08-27 ENCOUNTER — TELEPHONE (OUTPATIENT)
Dept: INTERNAL MEDICINE | Facility: CLINIC | Age: 80
End: 2025-08-27
Payer: MEDICARE

## 2025-08-27 ENCOUNTER — PATIENT MESSAGE (OUTPATIENT)
Dept: INTERNAL MEDICINE | Facility: CLINIC | Age: 80
End: 2025-08-27
Payer: MEDICARE

## 2025-08-27 DIAGNOSIS — R06.02 SOB (SHORTNESS OF BREATH): Primary | ICD-10-CM

## 2025-08-27 DIAGNOSIS — R94.31 ABNORMAL EKG: ICD-10-CM

## 2025-09-05 ENCOUNTER — HOSPITAL ENCOUNTER (OUTPATIENT)
Dept: CARDIOLOGY | Facility: HOSPITAL | Age: 80
Discharge: HOME | End: 2025-09-05
Attending: INTERNAL MEDICINE
Payer: MEDICARE

## 2025-09-05 ENCOUNTER — PATIENT MESSAGE (OUTPATIENT)
Dept: INTERNAL MEDICINE | Facility: CLINIC | Age: 80
End: 2025-09-05
Payer: MEDICARE

## 2025-09-05 DIAGNOSIS — R06.02 SOB (SHORTNESS OF BREATH): ICD-10-CM

## 2025-09-05 DIAGNOSIS — R94.31 ABNORMAL EKG: ICD-10-CM

## 2025-09-05 LAB
ASCENDING AORTA: 3 CM
AV INDEX (PROSTH): 0.89
AV MEAN GRADIENT: 5 MMHG
AV PEAK GRADIENT: 8 MMHG
AV REGURGITATION PRESSURE HALF TIME: 540 MS
AV VALVE AREA BY VELOCITY RATIO: 2.7 CM²
AV VALVE AREA: 2.8 CM²
AV VELOCITY RATIO: 0.86
CV ECHO LV RWT: 0.71 CM
CV STRESS BASE HR: 74 BPM
DIASTOLIC BLOOD PRESSURE: 83 MMHG
DOP CALC AO PEAK VEL: 1.4 M/S
DOP CALC AO VTI: 31.5 CM
DOP CALC LVOT AREA: 3.1 CM2
DOP CALC LVOT DIAMETER: 2 CM
DOP CALC LVOT PEAK VEL: 1.2 M/S
DOP CALC MV VTI: 15.5 CM
DOP CALCLVOT PEAK VEL VTI: 28.1 CM
E WAVE DECELERATION TIME: 298 MSEC
E/A RATIO: 0.68
E/E' RATIO: 7 M/S
ECHO LV POSTERIOR WALL: 1.2 CM (ref 0.6–1.1)
FRACTIONAL SHORTENING: 35.3 % (ref 28–44)
INTERVENTRICULAR SEPTUM: 1.3 CM (ref 0.6–1.1)
IVC DIAMETER: 1.43 CM
LA MAJOR: 5.5 CM
LA MINOR: 5.4 CM
LA WIDTH: 4.8 CM
LEFT ATRIUM SIZE: 3.8 CM
LEFT ATRIUM VOLUME: 84 CM3
LEFT INTERNAL DIMENSION IN SYSTOLE: 2.2 CM (ref 2.1–4)
LEFT VENTRICLE DIASTOLIC VOLUME: 49 ML
LEFT VENTRICLE SYSTOLIC VOLUME: 17 ML
LEFT VENTRICULAR INTERNAL DIMENSION IN DIASTOLE: 3.4 CM (ref 3.5–6)
LEFT VENTRICULAR MASS: 138.8 G
LV LATERAL E/E' RATIO: 5.4 M/S
LV SEPTAL E/E' RATIO: 8.6 M/S
LVED V (TEICH): 48.58 ML
LVES V (TEICH): 16.62 ML
LVOT MG: 3.32 MMHG
LVOT MV: 0.87 CM/S
MV MEAN GRADIENT: 1 MMHG
MV PEAK A VEL: 0.63 M/S
MV PEAK E VEL: 0.43 M/S
MV PEAK GRADIENT: 2 MMHG
MV STENOSIS PRESSURE HALF TIME: 86.28 MS
MV VALVE AREA BY CONTINUITY EQUATION: 5.69 CM2
MV VALVE AREA P 1/2 METHOD: 2.55 CM2
OHS CV CPX 1 MINUTE RECOVERY HEART RATE: 122 BPM
OHS CV CPX 85 PERCENT MAX PREDICTED HEART RATE MALE: 122
OHS CV CPX ESTIMATED METS: 5
OHS CV CPX MAX PREDICTED HEART RATE: 143
OHS CV CPX PATIENT IS FEMALE: 1
OHS CV CPX PATIENT IS MALE: 0
OHS CV CPX PEAK DIASTOLIC BLOOD PRESSURE: 95 MMHG
OHS CV CPX PEAK HEAR RATE: 148 BPM
OHS CV CPX PEAK RATE PRESSURE PRODUCT: ABNORMAL
OHS CV CPX PEAK SYSTOLIC BLOOD PRESSURE: 214 MMHG
OHS CV CPX PERCENT MAX PREDICTED HEART RATE ACHIEVED: 107
OHS CV CPX RATE PRESSURE PRODUCT PRESENTING: 9916
OHS CV RV/LV RATIO: 0.97 CM
PISA AR MAX VEL: 3.61 M/S
PISA TR MAX VEL: 2.4 M/S
PULM VEIN S/D RATIO: 1.68
PV PEAK D VEL: 0.4 M/S
PV PEAK GRADIENT: 3 MMHG
PV PEAK S VEL: 0.67 M/S
PV PEAK VELOCITY: 0.92 M/S
RA MAJOR: 5.1 CM
RA PRESSURE ESTIMATED: 3 MMHG
RA WIDTH: 3.3 CM
RIGHT VENTRICLE DIASTOLIC BASEL DIMENSION: 3.3 CM
RIGHT VENTRICULAR END-DIASTOLIC DIMENSION: 3.25 CM
RV TB RVSP: 5 MMHG
RV TISSUE DOPPLER FREE WALL SYSTOLIC VELOCITY 1 (APICAL 4 CHAMBER VIEW): 12.24 CM/S
SINUS: 3.2 CM
STJ: 2.8 CM
STRESS ECHO POST EXERCISE DUR MIN: 3 MINUTES
STRESS ECHO POST EXERCISE DUR SEC: 0 SECONDS
SYSTOLIC BLOOD PRESSURE: 134 MMHG
TDI LATERAL: 0.08 M/S
TDI SEPTAL: 0.05 M/S
TDI: 0.07 M/S
TR MAX PG: 22 MMHG
TRICUSPID ANNULAR PLANE SYSTOLIC EXCURSION: 1.9 CM
TV REST PULMONARY ARTERY PRESSURE: 26 MMHG

## 2025-09-05 PROCEDURE — 93320 DOPPLER ECHO COMPLETE: CPT | Mod: 26,HCNC,, | Performed by: INTERNAL MEDICINE

## 2025-09-05 PROCEDURE — 93325 DOPPLER ECHO COLOR FLOW MAPG: CPT | Mod: 26,HCNC,, | Performed by: INTERNAL MEDICINE

## 2025-09-05 PROCEDURE — 93351 STRESS TTE COMPLETE: CPT | Mod: 26,HCNC,, | Performed by: INTERNAL MEDICINE

## 2025-09-05 PROCEDURE — 93351 STRESS TTE COMPLETE: CPT | Mod: HCNC
